# Patient Record
Sex: FEMALE | Race: WHITE | NOT HISPANIC OR LATINO | Employment: FULL TIME | URBAN - METROPOLITAN AREA
[De-identification: names, ages, dates, MRNs, and addresses within clinical notes are randomized per-mention and may not be internally consistent; named-entity substitution may affect disease eponyms.]

---

## 2017-01-19 ENCOUNTER — OFFICE VISIT (OUTPATIENT)
Dept: GASTROENTEROLOGY | Facility: CLINIC | Age: 42
End: 2017-01-19

## 2017-01-19 VITALS
WEIGHT: 192.2 LBS | SYSTOLIC BLOOD PRESSURE: 128 MMHG | BODY MASS INDEX: 29.13 KG/M2 | HEIGHT: 68 IN | DIASTOLIC BLOOD PRESSURE: 78 MMHG

## 2017-01-19 DIAGNOSIS — R10.13 DYSPEPSIA: Primary | ICD-10-CM

## 2017-01-19 PROCEDURE — 99204 OFFICE O/P NEW MOD 45 MIN: CPT | Performed by: INTERNAL MEDICINE

## 2017-01-19 RX ORDER — DEXTROAMPHETAMINE SACCHARATE, AMPHETAMINE ASPARTATE, DEXTROAMPHETAMINE SULFATE AND AMPHETAMINE SULFATE 3.75; 3.75; 3.75; 3.75 MG/1; MG/1; MG/1; MG/1
TABLET ORAL
Refills: 0 | COMMUNITY
Start: 2017-01-03 | End: 2021-06-16

## 2017-01-19 RX ORDER — VALSARTAN AND HYDROCHLOROTHIAZIDE 320; 25 MG/1; MG/1
TABLET, FILM COATED ORAL
Refills: 0 | COMMUNITY
Start: 2017-01-04 | End: 2019-05-21

## 2017-01-19 NOTE — MR AVS SNAPSHOT
Radha Machado   2017 8:45 AM   Office Visit    Dept Phone:  [phone]   Encounter #:  [id number]    Provider:  Kody Mendez MD   Department:  [facility]                Your Full Care Plan              Your Updated Medication List          This list is accurate as of: 17  9:56 AM.  Always use your most recent med list.                amphetamine-dextroamphetamine 15 MG tablet   Commonly known as:  ADDERALL       MULTIVITAMIN ADULT PO       valsartan-hydrochlorothiazide 320-25 MG per tablet   Commonly known as:  DIOVAN-HCT               You Were Diagnosed With        Codes Comments    Dyspepsia    -  Primary ICD-10-CM: R10.13  ICD-9-CM: 536.8       Instructions     None    Patient Instructions History      Upcoming Appointments     Visit Type Date Time Department    NEW PATIENT 2017  8:45 AM [facility]      VIVA Signup     [facility] VIVA allows you to send messages to your doctor, view your test results, renew your prescriptions, schedule appointments, and more. To sign up, go to [email or web address] and click on the Sign Up Now link in the New User? box. Enter your VIVA Activation Code exactly as it appears below along with the last four digits of your Social Security Number and your Date of Birth () to complete the sign-up process. If you do not sign up before the expiration date, you must request a new code.    VIVA Activation Code: [email or web address]  Expires: 2017  9:56 AM    If you have questions, you can email [email or web address] or call [phone] to talk to our VIVA staff. Remember, VIVA is NOT to be used for urgent needs. For medical emergencies, dial 911.               Other Info from Your Visit           Allergies     No Known Allergies      Reason for Visit     Heartburn     regurgitation           Vital Signs     Blood Pressure Height Weight Body Mass Index Smoking Status  "      128/78 68\" (172.7 cm) 192 lb 3.2 oz (87.2 kg) 29.22 kg/m2 Never Smoker       Problems and Diagnoses Noted     Dyspepsia    -  Primary        "

## 2017-01-19 NOTE — PROGRESS NOTES
Chief Complaint   Patient presents with   • Heartburn   • regurgitation       History of Present Illness: She has lots of heartburn and may vomit x couple of years, not constant. No abdominal or chest pain.  No dysphagia. No dairrhea or constipation.  NO rectal bleeding or melena. NO NSAIDs use. Weight has been stable. She takes Zantac and OTC ppi's which may help.  Occasional ETOH. She drinks 2 cups of coffee/day. LMP yesterday. .     Past Medical History   Diagnosis Date   • Hypertension        Past Surgical History   Procedure Laterality Date   • Breast augmentation     • [place] tooth extraction           Current Outpatient Prescriptions:   •  Multiple Vitamins-Minerals (MULTIVITAMIN ADULT PO), Take  by mouth., Disp: , Rfl:   •  amphetamine-dextroamphetamine (ADDERALL) 15 MG tablet, TK 1 T PO  BID, Disp: , Rfl: 0  •  valsartan-hydrochlorothiazide (DIOVAN-HCT) 320-25 MG per tablet, TK 1 T PO QD, Disp: , Rfl: 0    No Known Allergies    Family History   Problem Relation Age of Onset   • Adopted: Yes       Social History     Social History   • Marital status: Single     Spouse name: N/A   • Number of children: N/A   • Years of education: N/A     Occupational History   • Not on file.     Social History Main Topics   • Smoking status: Never Smoker   • Smokeless tobacco: Not on file   • Alcohol use Yes      Comment: social   • Drug use: No   • Sexual activity: Not on file     Other Topics Concern   • Not on file     Social History Narrative   • No narrative on file       Review of Systems   All other systems reviewed and are negative.      Vitals:    17 0900   BP: 128/78       Physical Exam   Constitutional: She is oriented to person, place, and time. She appears well-developed and well-nourished. No distress.   HENT:   Head: Normocephalic and atraumatic. Hair is normal.   Right Ear: Hearing, tympanic membrane, external ear and ear canal normal. No drainage. No decreased hearing is noted.   Left Ear: Hearing,  tympanic membrane, external ear and ear canal normal. No decreased hearing is noted.   Nose: No nasal deformity.   Mouth/Throat: Oropharynx is clear and moist.   Eyes: Conjunctivae, EOM and lids are normal. Pupils are equal, round, and reactive to light. Right eye exhibits no discharge. Left eye exhibits no discharge.   Neck: Normal range of motion. Neck supple. No JVD present. No tracheal deviation present. No thyromegaly present.   Cardiovascular: Normal rate, regular rhythm, normal heart sounds, intact distal pulses and normal pulses.  Exam reveals no gallop and no friction rub.    No murmur heard.  Pulmonary/Chest: Effort normal and breath sounds normal. No respiratory distress. She has no wheezes. She has no rales. She exhibits no tenderness.   Abdominal: Soft. Bowel sounds are normal. She exhibits no distension and no mass. There is no tenderness. There is no rebound and no guarding. No hernia.   Genitourinary:   Genitourinary Comments: Rectal refused   Musculoskeletal: Normal range of motion. She exhibits no edema, tenderness or deformity.   Lymphadenopathy:     She has no cervical adenopathy.   Neurological: She is alert and oriented to person, place, and time. She has normal reflexes. She displays normal reflexes. No cranial nerve deficit. She exhibits normal muscle tone. Coordination normal.   Skin: Skin is warm and dry. No rash noted. She is not diaphoretic. No erythema.   Psychiatric: She has a normal mood and affect. Her behavior is normal. Judgment and thought content normal.   Vitals reviewed.      Radha was seen today for heartburn and regurgitation.    Diagnoses and all orders for this visit:    Dyspepsia  -     US Gallbladder; Future       Assessment:  1) Dyspepsia    Recommendations:  1) Protonix 40 mg/day  2) US of the gallbladder  3) f/u 4-6 weeks.      Return in about 4 weeks (around 2/16/2017).

## 2017-08-14 ENCOUNTER — PROCEDURE VISIT (OUTPATIENT)
Dept: OBSTETRICS AND GYNECOLOGY | Age: 42
End: 2017-08-14

## 2017-08-14 ENCOUNTER — OFFICE VISIT (OUTPATIENT)
Dept: OBSTETRICS AND GYNECOLOGY | Age: 42
End: 2017-08-14

## 2017-08-14 VITALS
DIASTOLIC BLOOD PRESSURE: 72 MMHG | BODY MASS INDEX: 28.64 KG/M2 | WEIGHT: 189 LBS | SYSTOLIC BLOOD PRESSURE: 110 MMHG | HEIGHT: 68 IN

## 2017-08-14 DIAGNOSIS — F32.81 PREMENSTRUAL DYSPHORIC DISORDER: ICD-10-CM

## 2017-08-14 DIAGNOSIS — Z01.419 ENCOUNTER FOR GYNECOLOGICAL EXAMINATION: Primary | ICD-10-CM

## 2017-08-14 DIAGNOSIS — R11.0 NAUSEA IN ADULT: ICD-10-CM

## 2017-08-14 DIAGNOSIS — R10.2 PELVIC PAIN: Primary | ICD-10-CM

## 2017-08-14 DIAGNOSIS — L65.9 HAIR LOSS: ICD-10-CM

## 2017-08-14 DIAGNOSIS — R10.2 PELVIC PAIN IN FEMALE: ICD-10-CM

## 2017-08-14 PROCEDURE — 99386 PREV VISIT NEW AGE 40-64: CPT | Performed by: OBSTETRICS & GYNECOLOGY

## 2017-08-14 PROCEDURE — 76830 TRANSVAGINAL US NON-OB: CPT | Performed by: OBSTETRICS & GYNECOLOGY

## 2017-08-14 RX ORDER — NORETHINDRONE ACETATE AND ETHINYL ESTRADIOL 1; 5 MG/1; UG/1
1 TABLET ORAL DAILY
Qty: 28 TABLET | Refills: 11 | Status: SHIPPED | OUTPATIENT
Start: 2017-08-14 | End: 2018-07-19 | Stop reason: SDUPTHER

## 2017-08-14 NOTE — PROGRESS NOTES
"Subjective     Chief Complaint   Patient presents with   • Gynecologic Exam     New pt:annual,discuss painful ovulation and periods         History of Present Illness      Radha Machado is a very pleasant  42 y.o. female who presents for annual exam.  Mammo [id number] , Discussing importance of scheduling again this year., Contraception vas, Exercise none.    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 Symptoms,    pelvic pain in the second half of her menstrual cycle    As well as some nausea and vomiting intermittently    She has no galactorrhea no hirsutism, she is adopted family history is lacking. She states she is not under any increased stress. She does work third shift sleep cycles not always normal      Obstetric History:  OB History     No data available         Menstrual History:     Patient's last menstrual period was 07/20/2017.       Sexual History:       Past Medical History:   Diagnosis Date   • High blood pressure    • Hypertension    • Menses painful      Past Surgical History:   Procedure Laterality Date   • [facility]      [facility]    • [facility]TRA[facility]         SOCIAL Hx:      The following portions of the patient's history were reviewed and updated as appropriate: allergies, current medications, past family history, past medical history, past social history, past surgical history and problem list.    Review of Systems        Except as outlined in history of physical illness, patient denies any changes in her GYN, , GI systems.  All other systems reviewed were negative.No galactorrhea no hirsutism, no change in diameter of TMs, no voiding pattern changes, no unusual levels of fatigue.         Objective   Physical Exam    /72  Ht 68\" (172.7 cm)  Wt 189 lb (85.7 kg)  LMP 07/20/2017  BMI 28.74 kg/m2    General: Patient is alert and oriented and appears overall " healthy  Neck: Is supple without thyromegaly, no carotid bruits and no lymphadenopathy  Lungs: Clear bilaterally, no wheezing, rhonchi, or rales.  Respiratory rate is normal  Breast: Even, symmetrical, no lymphadenopathy, no retraction, no masses or cysts, bilateral augmentation noted  Heart: Regular rate and rhythm are appreciated, no murmurs or rubs are heard  Abdomen: Is soft, without organomegaly, bowel sounds are positive, there is no                                rebound or guarding and palpation does not produce any discomfort  Back: Nontender without CVA tenderness  Pelvic: External genitalia appear normal and consistent with mature female.  BUS normal                            Vagina is clean dry without discharge and appears adequately estrogenized, no               lesions or masses are present                         Cervix is noninflamed without discharge or lesions.  There is no cervical motion             tenderness.                Uterus is nonenlarged, without tenderness, and no masses or abnormalities are  present               Adnexa are non-enlarged, non tender               Rectal exam reveals adequate sphincter tone and no masses or lesions are                     appreciated on digital rectal examination.    Patient Active Problem List   Diagnosis   • Hair loss                 Assessment/Plan   Radha was seen today for gynecologic exam.    Diagnoses and all orders for this visit:    Encounter for gynecological examination  -     IGP, Apt HPV,rfx 16 / 18,45    Premenstrual dysphoric disorder  -     DHEA-Sulfate  -     Estradiol  -     Testosterone (Free & Total), LC / MS  -     Thyroid Panel With TSH    Pelvic pain in female  This has been going for 4 years but seems to be worse the last 4 months more pronounced in the second half of the menstrual cycle also increased about the same time hair loss increased. We will check a pelvic ultrasound and lab work as outlined above. Consideration as  above to cycle control.  Hair loss  -     DHEA-Sulfate  -     Estradiol  -     Testosterone (Free & Total), LC / MS  -     Thyroid Panel With TSH    Nausea in adult  If GYN workup negative then would recommend following up with PCP her gastroenterologist. Possibility of obtaining a colonoscopy was discussed. Patient will decide      Annual Well Woman Exam    Discussed today's findings and concerns with patient in detail.  Continue to recommend regular exercise to include cardiovascular and resistance training and breast self-exam. Wellness lab, mammography, and pap smear, in accordance with age guidelines.  Nutritional and caloric recommendations discussed.    All of the patient's questions were addressed and answered, I have encouraged her to call for today's test results if she has not received them within 10 days.  Patient is advised to call with any change in her condition or with any other questions, otherwise return in 12 months for annual examination.

## 2017-08-16 LAB
CYTOLOGIST CVX/VAG CYTO: NORMAL
CYTOLOGY CVX/VAG DOC THIN PREP: NORMAL
DX ICD CODE: NORMAL
HIV 1 & 2 AB SER-IMP: NORMAL
HPV I/H RISK 4 DNA CVX QL PROBE+SIG AMP: NEGATIVE
OTHER STN SPEC: NORMAL
PATH REPORT.FINAL DX SPEC: NORMAL
STAT OF ADQ CVX/VAG CYTO-IMP: NORMAL

## 2017-08-17 LAB
DHEA-S SERPL-MCNC: 88.8 UG/DL (ref 57.3–279.2)
ESTRADIOL SERPL-MCNC: 51.7 PG/ML
FT4I SERPL CALC-MCNC: 2.5 (ref 1.2–4.9)
T3RU NFR SERPL: 26 % (ref 24–39)
T4 SERPL-MCNC: 9.5 UG/DL (ref 4.5–12)
TESTOST FREE SERPL-MCNC: 0.7 PG/ML (ref 0–4.2)
TESTOST SERPL-MCNC: 9.3 NG/DL
TSH SERPL DL<=0.005 MIU/L-ACNC: 0.69 UIU/ML (ref 0.45–4.5)

## 2017-08-21 ENCOUNTER — TELEPHONE (OUTPATIENT)
Dept: OBSTETRICS AND GYNECOLOGY | Age: 42
End: 2017-08-21

## 2017-08-21 NOTE — TELEPHONE ENCOUNTER
----- Message from Alin Swain MD sent at 8/21/2017  7:44 AM EDT -----  Please notify patient that her labs were normal please keep follow-up appointment as scheduled and call with any change

## 2017-10-03 ENCOUNTER — HOSPITAL ENCOUNTER (EMERGENCY)
Facility: HOSPITAL | Age: 42
Discharge: HOME | End: 2017-10-03
Attending: FAMILY MEDICINE | Admitting: FAMILY MEDICINE

## 2017-10-03 VITALS
BODY MASS INDEX: 28.04 KG/M2 | TEMPERATURE: 97.7 F | DIASTOLIC BLOOD PRESSURE: 91 MMHG | HEART RATE: 75 BPM | SYSTOLIC BLOOD PRESSURE: 123 MMHG | WEIGHT: 185 LBS | HEIGHT: 68 IN | OXYGEN SATURATION: 96 % | RESPIRATION RATE: 16 BRPM

## 2017-10-03 DIAGNOSIS — S39.012A STRAIN OF LUMBAR REGION, INITIAL ENCOUNTER: Primary | ICD-10-CM

## 2017-10-03 PROCEDURE — 99283 EMERGENCY DEPT VISIT LOW MDM: CPT

## 2017-10-03 RX ORDER — HYDROCODONE BITARTRATE AND ACETAMINOPHEN 7.5; 325 MG/1; MG/1
1 TABLET ORAL EVERY 6 HOURS PRN
Qty: 15 TABLET | Refills: 0 | Status: SHIPPED | OUTPATIENT
Start: 2017-10-03 | End: 2018-09-28

## 2017-10-03 RX ORDER — BACLOFEN 10 MG/1
10 TABLET ORAL 3 TIMES DAILY PRN
Qty: 20 TABLET | Refills: 0 | Status: SHIPPED | OUTPATIENT
Start: 2017-10-03 | End: 2018-09-28

## 2017-10-03 RX ORDER — IBUPROFEN 800 MG/1
800 TABLET ORAL EVERY 8 HOURS PRN
Qty: 30 TABLET | Refills: 0 | Status: SHIPPED | OUTPATIENT
Start: 2017-10-03 | End: 2019-05-21

## 2017-10-03 RX ORDER — ONDANSETRON 4 MG/1
4 TABLET, FILM COATED ORAL EVERY 8 HOURS PRN
Qty: 15 TABLET | Refills: 0 | Status: SHIPPED | OUTPATIENT
Start: 2017-10-03 | End: 2018-07-27

## 2017-10-03 NOTE — ED PROVIDER NOTES
History    The patient presents to the ED complaining of atraumatic, positional centralized lower back pain which began approximately two days ago. She denies radiation of pain down her BLE. 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 She also denies any focal weakness, incontinence of bowel or bladder or saddle anesthesia since onset and she has no other complaints at this time.     On physical exam, non-focal neuro exam, moves easily     Plan: Discharge the patient home.     I supervised care provided by the midlevel provider.  We have discussed this patient's history, physical exam, and treatment plan. I have reviewed the note and personally saw and examined the patient and agree with the plan of care.    Documentation assistance provided by karishma Langford for .  Information recorded by the scribe was done at my direction and has been verified and validated by me.       Max Langford  10/03/17 1023       Raudel Wyman MD  10/03/17 [id number]
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
## 2018-07-20 RX ORDER — NORETHINDRONE ACETATE AND ETHINYL ESTRADIOL 1; 5 MG/1; UG/1
1 TABLET ORAL DAILY
Qty: 28 TABLET | Refills: 11 | Status: SHIPPED | OUTPATIENT
Start: 2018-07-20 | End: 2019-12-13

## 2018-07-27 ENCOUNTER — OFFICE VISIT (OUTPATIENT)
Dept: GASTROENTEROLOGY | Facility: CLINIC | Age: 43
End: 2018-07-27

## 2018-07-27 VITALS
WEIGHT: 189.2 LBS | TEMPERATURE: 98 F | SYSTOLIC BLOOD PRESSURE: 128 MMHG | BODY MASS INDEX: 28.67 KG/M2 | HEIGHT: 68 IN | DIASTOLIC BLOOD PRESSURE: 70 MMHG

## 2018-07-27 DIAGNOSIS — K21.9 GASTROESOPHAGEAL REFLUX DISEASE, ESOPHAGITIS PRESENCE NOT SPECIFIED: Primary | ICD-10-CM

## 2018-07-27 DIAGNOSIS — R11.0 NAUSEA: ICD-10-CM

## 2018-07-27 DIAGNOSIS — R10.13 DYSPEPSIA: ICD-10-CM

## 2018-07-27 DIAGNOSIS — Z12.11 ENCOUNTER FOR SCREENING FOR MALIGNANT NEOPLASM OF COLON: ICD-10-CM

## 2018-07-27 PROCEDURE — 99214 OFFICE O/P EST MOD 30 MIN: CPT | Performed by: NURSE PRACTITIONER

## 2018-07-27 RX ORDER — PANTOPRAZOLE SODIUM 40 MG/1
40 TABLET, DELAYED RELEASE ORAL DAILY
Qty: 90 TABLET | Refills: 1 | Status: SHIPPED | OUTPATIENT
Start: 2018-07-27 | End: 2019-08-23 | Stop reason: SDUPTHER

## 2018-07-27 RX ORDER — PANTOPRAZOLE SODIUM 40 MG/1
40 TABLET, DELAYED RELEASE ORAL DAILY
Qty: 90 TABLET | Refills: 3 | Status: SHIPPED | OUTPATIENT
Start: 2018-07-27 | End: 2018-07-27 | Stop reason: SDUPTHER

## 2018-07-27 RX ORDER — ONDANSETRON 4 MG/1
4 TABLET, ORALLY DISINTEGRATING ORAL EVERY 8 HOURS PRN
Qty: 20 TABLET | Refills: 2 | Status: SHIPPED | OUTPATIENT
Start: 2018-07-27 | End: 2019-08-06 | Stop reason: SDUPTHER

## 2018-07-27 RX ORDER — ONDANSETRON 4 MG/1
4 TABLET, ORALLY DISINTEGRATING ORAL EVERY 8 HOURS PRN
Qty: 20 TABLET | Refills: 2 | Status: SHIPPED | OUTPATIENT
Start: 2018-07-27 | End: 2018-07-27 | Stop reason: SDUPTHER

## 2018-07-27 NOTE — PROGRESS NOTES
Chief Complaint   Patient presents with   • Dyspepsia       Radha Robles is a  43 y.o. female here for a follow up visit for GERD and dyspepsia.   HPI  42 yo f presents today for follow up visit for GERD and dyspepsia. She is a patient of Dr. Mendez. She was last seen in the office on 1/2017. She has hx GERD and dyspepsia. She has been doing well on protonix 40 mg daily. She admits she ran out of it several weeks ago and has been miserable since. She admits with it she denies any dysphagia, reflux, abd pain, N&V, diarrhea, constipation, rectal bleeding or melena. She admits her appetite is good and her weight is stable. She is adopted and has no family hx. She is due for screening colonoscopy.     Past Medical History:   Diagnosis Date   • High blood pressure    • Hypertension    • Menses painful        Past Surgical History:   Procedure Laterality Date   • BREAST AUGMENTATION      2005,  ,[facility]    • WISDOM TOOTH EXTRACTION         Scheduled Meds:    Continuous Infusions:  No current facility-administered medications for this visit.     PRN Meds:.    No Known Allergies    Social History     Social History   • Marital status: Single     Spouse name: N/A   • Number of children: N/A   • Years of education: N/A     Occupational History   • Not on file.     Social History Main Topics   • Smoking status: Never Smoker   • Smokeless tobacco: Never Used   • Alcohol use Yes      Comment: social   • Drug use: No   • Sexual activity: Not on file     Other Topics Concern   • Not on file     Social History Narrative   • No narrative on file       Family History   Problem Relation Age of Onset   • Adopted: Yes       Review of Systems   Constitutional: Negative for appetite change, chills, diaphoresis, fatigue, fever and unexpected weight change.   HENT: Negative for nosebleeds, postnasal drip, sore throat, trouble swallowing and voice change.    Respiratory: Negative for cough, choking, chest tightness, shortness of  breath and wheezing.    Cardiovascular: Negative for chest pain.   Gastrointestinal: Negative for abdominal distention, abdominal pain, anal bleeding, blood in stool, constipation, diarrhea, nausea, rectal pain and vomiting.   Endocrine: Negative for polydipsia, polyphagia and polyuria.   Musculoskeletal: Negative for gait problem.   Skin: Negative for rash and wound.   Allergic/Immunologic: Negative for food allergies.   Neurological: Negative for dizziness, speech difficulty and light-headedness.   Psychiatric/Behavioral: Negative for confusion, self-injury, sleep disturbance and suicidal ideas.       Vitals:    07/27/18 1503   BP: 128/70   Temp: 98 °F (36.7 °C)       Physical Exam   Constitutional: She is oriented to person, place, and time. She appears well-developed and well-nourished. She does not appear ill. No distress.   HENT:   Head: Normocephalic.   Eyes: Pupils are equal, round, and reactive to light.   Cardiovascular: Normal rate, regular rhythm and normal heart sounds.    Pulmonary/Chest: Effort normal and breath sounds normal.   Abdominal: Soft. Bowel sounds are normal. She exhibits no distension and no mass. There is no hepatosplenomegaly. There is no tenderness. There is no rebound and no guarding. No hernia.   Musculoskeletal: Normal range of motion.   Neurological: She is alert and oriented to person, place, and time.   Skin: Skin is warm and dry.   Psychiatric: She has a normal mood and affect. Her speech is normal and behavior is normal. Judgment normal.       No images are attached to the encounter.    Assessment & Plan     1. Gastroesophageal reflux disease, esophagitis presence not specified  - pantoprazole (PROTONIX) 40 MG EC tablet; Take 1 tablet by mouth Daily.  Dispense: 90 tablet; Refill: 3    2. Dyspepsia    3. Encounter for screening for malignant neoplasm of colon  - Case Request; Standing  - Case Request    4. Nausea  - ondansetron ODT (ZOFRAN ODT) 4 MG disintegrating tablet; Take 1  tablet by mouth Every 8 (Eight) Hours As Needed for Nausea or Vomiting.  Dispense: 20 tablet; Refill: 2    GERD and dyspepsia is well controlled on the protonix 40 mg daily. Will refill x 1 year. She is due for screening colonoscopy. I discussed the potential risks of the procedure with the patient and she is agreeable to proceed. Follow up with Dr. Mendez in 1 year. Call the office with any issues.

## 2018-09-28 ENCOUNTER — OFFICE VISIT (OUTPATIENT)
Dept: OBSTETRICS AND GYNECOLOGY | Age: 43
End: 2018-09-28

## 2018-09-28 VITALS
BODY MASS INDEX: 29.55 KG/M2 | DIASTOLIC BLOOD PRESSURE: 84 MMHG | WEIGHT: 195 LBS | SYSTOLIC BLOOD PRESSURE: 124 MMHG | HEIGHT: 68 IN

## 2018-09-28 DIAGNOSIS — Z01.419 WELL WOMAN EXAM WITH ROUTINE GYNECOLOGICAL EXAM: Primary | ICD-10-CM

## 2018-09-28 PROCEDURE — 99396 PREV VISIT EST AGE 40-64: CPT | Performed by: PHYSICIAN ASSISTANT

## 2018-09-28 NOTE — PROGRESS NOTES
"Subjective     Chief Complaint   Patient presents with   • Gynecologic Exam     annual exam. last pap 08/14/2017 neg/neg  m/g 2017       History of Present Illness    Radha Robles is a 43 y.o. No obstetric history on file. who presents for annual exam.    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         Current contraception: OCP (estrogen/progesterone)  History of abnormal Pap smear: no  Received Gardasil immunization: no  Perform regular self breast exam: yes - mthly  Family history of uterine or ovarian cancer: unsure d/t adopted  Family History of colon cancer: adopted  Family history of breast cancer: adopted    Mammogram: ordered.  Colonoscopy: recommended. Has it schedule in October  DEXA: not indicated.    Exercise: moderately active  Calcium/Vitamin D: adequate intake    The following portions of the patient's history were reviewed and updated as appropriate: allergies, current medications, past family history, past medical history, past social history, past surgical history and problem list.    Review of Systems   All other systems reviewed and are negative.      Review of Systems   Constitutional: Negative for fatigue.   Respiratory: Negative for shortness of breath.    Gastrointestinal: Negative for abdominal pain.   Genitourinary: Negative for dysuria.   Neurological: Negative for headaches.   Psychiatric/Behavioral: Negative for dysphoric mood.         Objective   Physical Exam    /84   Ht 172.7 cm (68\")   Wt 88.5 kg (195 lb)   Breastfeeding? No   BMI 29.65 kg/m²     General:   alert, appears stated age and " cooperative   Neck: no adenopathy and thyroid normal to palpation   Heart: regular rate and rhythm   Lungs: clear to auscultation bilaterally   Abdomen: soft and nontender   Breast: inspection negative, no nipple discharge or bleeding, no masses or nodularity palpable and B implants intact   Vulva: normal   Vagina: normal mucosa, normal discharge   Cervix: no lesions   Uterus: normal size, non-tender   Adnexa: normal adnexa and no mass, fullness, tenderness   Rectal: not indicated     Assessment/Plan   Radha was seen today for gynecologic exam.    Diagnoses and all orders for this visit:    Well woman exam with routine gynecological exam        All questions answered.  Breast self exam technique reviewed and patient encouraged to perform self-exam monthly.  Discussed healthy lifestyle modifications.  Recommended 30 minutes of aerobic exercise five times per week.  Discussed calcium needs to prevent osteoporosis.    Disc pap guidelines, will do pap in 2020  Call for any problems

## 2018-10-30 ENCOUNTER — ANESTHESIA EVENT (OUTPATIENT)
Dept: GASTROENTEROLOGY | Facility: HOSPITAL | Age: 43
End: 2018-10-30

## 2018-10-30 ENCOUNTER — HOSPITAL ENCOUNTER (OUTPATIENT)
Facility: HOSPITAL | Age: 43
Setting detail: HOSPITAL OUTPATIENT SURGERY
Discharge: HOME | End: 2018-10-30
Attending: INTERNAL MEDICINE | Admitting: INTERNAL MEDICINE

## 2018-10-30 ENCOUNTER — ANESTHESIA (OUTPATIENT)
Dept: GASTROENTEROLOGY | Facility: HOSPITAL | Age: 43
End: 2018-10-30

## 2018-10-30 VITALS
HEIGHT: 68 IN | OXYGEN SATURATION: 100 % | RESPIRATION RATE: 18 BRPM | BODY MASS INDEX: 29.55 KG/M2 | TEMPERATURE: 97.7 F | DIASTOLIC BLOOD PRESSURE: 82 MMHG | HEART RATE: 67 BPM | SYSTOLIC BLOOD PRESSURE: 115 MMHG | WEIGHT: 195 LBS

## 2018-10-30 DIAGNOSIS — Z12.11 ENCOUNTER FOR SCREENING FOR MALIGNANT NEOPLASM OF COLON: ICD-10-CM

## 2018-10-30 PROCEDURE — 88305 TISSUE EXAM BY PATHOLOGIST: CPT | Performed by: INTERNAL MEDICINE

## 2018-10-30 PROCEDURE — 45380 COLONOSCOPY AND BIOPSY: CPT | Performed by: INTERNAL MEDICINE

## 2018-10-30 PROCEDURE — 25010000002 PROPOFOL 10 MG/ML EMULSION: Performed by: ANESTHESIOLOGY

## 2018-10-30 RX ORDER — LIDOCAINE HYDROCHLORIDE 20 MG/ML
INJECTION, SOLUTION INFILTRATION; PERINEURAL AS NEEDED
Status: DISCONTINUED | OUTPATIENT
Start: 2018-10-30 | End: 2018-10-30 | Stop reason: SURG

## 2018-10-30 RX ORDER — SODIUM CHLORIDE, SODIUM LACTATE, POTASSIUM CHLORIDE, CALCIUM CHLORIDE 600; 310; 30; 20 MG/100ML; MG/100ML; MG/100ML; MG/100ML
30 INJECTION, SOLUTION INTRAVENOUS CONTINUOUS PRN
Status: DISCONTINUED | OUTPATIENT
Start: 2018-10-30 | End: 2018-10-30 | Stop reason: HOSPADM

## 2018-10-30 RX ORDER — SODIUM CHLORIDE 0.9 % (FLUSH) 0.9 %
3-10 SYRINGE (ML) INJECTION AS NEEDED
Status: DISCONTINUED | OUTPATIENT
Start: 2018-10-30 | End: 2018-10-30 | Stop reason: HOSPADM

## 2018-10-30 RX ORDER — PROPOFOL 10 MG/ML
VIAL (ML) INTRAVENOUS AS NEEDED
Status: DISCONTINUED | OUTPATIENT
Start: 2018-10-30 | End: 2018-10-30 | Stop reason: SURG

## 2018-10-30 RX ORDER — SODIUM CHLORIDE 0.9 % (FLUSH) 0.9 %
3 SYRINGE (ML) INJECTION EVERY 12 HOURS SCHEDULED
Status: DISCONTINUED | OUTPATIENT
Start: 2018-10-30 | End: 2018-10-30 | Stop reason: HOSPADM

## 2018-10-30 RX ADMIN — PROPOFOL 350 MG: 10 INJECTION, EMULSION INTRAVENOUS at 08:10

## 2018-10-30 RX ADMIN — LIDOCAINE HYDROCHLORIDE 80 MG: 20 INJECTION, SOLUTION INFILTRATION; PERINEURAL at 08:10

## 2018-10-30 RX ADMIN — SODIUM CHLORIDE, POTASSIUM CHLORIDE, SODIUM LACTATE AND CALCIUM CHLORIDE 30 ML/HR: 600; 310; 30; 20 INJECTION, SOLUTION INTRAVENOUS at 07:56

## 2018-10-30 RX ADMIN — SODIUM CHLORIDE, POTASSIUM CHLORIDE, SODIUM LACTATE AND CALCIUM CHLORIDE: 600; 310; 30; 20 INJECTION, SOLUTION INTRAVENOUS at 08:10

## 2018-10-30 NOTE — ANESTHESIA POSTPROCEDURE EVALUATION
"Patient: Radha Robles    Procedure Summary     Date:  10/30/18 Room / Location:   [facility] /  [facility]    Anesthesia Start:  0811 Anesthesia Stop:  0834    Procedure:  COLONOSCOPY TO THE CECUM AND TI WITH COLD BX POLYPECTOMIES (N/A ) Diagnosis:       Encounter for screening for malignant neoplasm of colon      (Encounter for screening for malignant neoplasm of colon [Z12.11])    Surgeon:  Kody Mendez MD Provider:  Rasta Moraes MD    Anesthesia Type:  MAC ASA Status:  2          Anesthesia Type: MAC  Last vitals  BP   126/86 (10/30/18 0728)   Temp   36.5 °C (97.7 °F) (10/30/18 0728)   Pulse   75 (10/30/18 0728)   Resp   12 (10/30/18 0728)     SpO2   100 % (10/30/18 0728)     Post Anesthesia Care and Evaluation    Patient location during evaluation: bedside  Patient participation: complete - patient participated  Level of consciousness: awake and alert  Pain management: adequate  Airway patency: patent  Anesthetic complications: No anesthetic complications    Cardiovascular status: acceptable  Respiratory status: acceptable  Hydration status: acceptable    Comments: /86 (BP Location: Left arm, Patient Position: Lying)   Pulse 75   Temp 36.5 °C (97.7 °F) (Oral)   Resp 12   Ht 172.7 cm (68\")   Wt 88.5 kg (195 lb)   SpO2 100%   BMI 29.65 kg/m²       "

## 2018-10-30 NOTE — ANESTHESIA PREPROCEDURE EVALUATION
Anesthesia Evaluation     Patient summary reviewed and Nursing notes reviewed   NPO Solid Status: > 8 hours  NPO Liquid Status: > 4 hours           Airway   Mallampati: II  TM distance: >3 FB  Neck ROM: full  no difficulty expected  Dental - normal exam     Pulmonary - normal exam   Cardiovascular - normal exam    (+) hypertension,       Neuro/Psych  GI/Hepatic/Renal/Endo      Musculoskeletal     Abdominal  - normal exam   Substance History      OB/GYN          Other                        Anesthesia Plan    ASA 2     MAC     Anesthetic plan, all risks, benefits, and alternatives have been provided, discussed and informed consent has been obtained with: patient.

## 2018-10-31 LAB
CYTO UR: NORMAL
LAB AP CASE REPORT: NORMAL
PATH REPORT.FINAL DX SPEC: NORMAL
PATH REPORT.GROSS SPEC: NORMAL

## 2018-11-06 ENCOUNTER — APPOINTMENT (OUTPATIENT)
Dept: WOMENS IMAGING | Facility: HOSPITAL | Age: 43
End: 2018-11-06

## 2018-11-06 ENCOUNTER — PROCEDURE VISIT (OUTPATIENT)
Dept: OBSTETRICS AND GYNECOLOGY | Age: 43
End: 2018-11-06

## 2018-11-06 DIAGNOSIS — Z12.31 VISIT FOR SCREENING MAMMOGRAM: Primary | ICD-10-CM

## 2018-11-06 PROCEDURE — 77067 SCR MAMMO BI INCL CAD: CPT | Performed by: RADIOLOGY

## 2018-11-06 PROCEDURE — 77067 SCR MAMMO BI INCL CAD: CPT | Performed by: OBSTETRICS & GYNECOLOGY

## 2018-11-14 ENCOUNTER — TELEPHONE (OUTPATIENT)
Dept: GASTROENTEROLOGY | Facility: CLINIC | Age: 43
End: 2018-11-14

## 2018-11-14 NOTE — TELEPHONE ENCOUNTER
----- Message from Kody Mendez MD sent at 11/14/2018  8:42 AM EST -----  Tell her that the colon polyps that were removed were not cancerous and not precancerous. I recommend a repeat screening colonoscopy in ten years. Thx. kjh

## 2018-11-14 NOTE — TELEPHONE ENCOUNTER
Call from pt.  Advise per Dr Mendez that colon polyps that were removed were not cancerous and not precancerous.  Recommend repeat screening c/s in 10 yrs.  Pt verb understanding.

## 2019-01-07 ENCOUNTER — HOSPITAL ENCOUNTER (EMERGENCY)
Facility: HOSPITAL | Age: 44
Discharge: HOME | End: 2019-01-07
Attending: EMERGENCY MEDICINE | Admitting: EMERGENCY MEDICINE

## 2019-01-07 VITALS
WEIGHT: 193 LBS | RESPIRATION RATE: 18 BRPM | TEMPERATURE: 99.1 F | HEART RATE: 106 BPM | OXYGEN SATURATION: 98 % | BODY MASS INDEX: 29.25 KG/M2 | HEIGHT: 68 IN | DIASTOLIC BLOOD PRESSURE: 86 MMHG | SYSTOLIC BLOOD PRESSURE: 122 MMHG

## 2019-01-07 DIAGNOSIS — J10.1 INFLUENZA A: Primary | ICD-10-CM

## 2019-01-07 LAB
FLUAV AG NPH QL: POSITIVE
FLUBV AG NPH QL IA: NEGATIVE

## 2019-01-07 PROCEDURE — 99283 EMERGENCY DEPT VISIT LOW MDM: CPT

## 2019-01-07 PROCEDURE — 87804 INFLUENZA ASSAY W/OPTIC: CPT | Performed by: EMERGENCY MEDICINE

## 2019-01-07 RX ADMIN — HYDROCODONE POLISTIREX AND CHLORPHENIRAMINE POLISTIREX 5 ML: 10; 8 SUSPENSION, EXTENDED RELEASE ORAL at 05:15

## 2019-01-07 NOTE — ED PROVIDER NOTES
EMERGENCY DEPARTMENT ENCOUNTER    CHIEF COMPLAINT  Chief Complaint: fever  History given by: patient  History limited by: nothing  Room Number: 15/15  PMD: Jaylan Gleason MD      HPI:  Pt is a 43 y.o. female who presents complaining of fever, a dry cough and generalized myalgias for the last 2-3 days. Pt also complains of diarrhea, post-tussive emesis and decreased appetite but denies nausea. 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 Pt states that she has taken Nyquil and Dayquil with mild transient relief    Duration:  2-3 days  Onset: gradual  Timing: constant  Location: generalized  Radiation: N/A  Quality: fever  Intensity/Severity: moderate  Progression: unchanged  Associated Symptoms: cough, generalized myalgias, diarrhea, post-tussive emesis, decreased appetite  Aggravating Factors: none  Alleviating Factors: none  Previous Episodes: none  Treatment before arrival: Pt states that she has taken Nyquil and Dayquil with mild transient relief    PAST MEDICAL HISTORY  Active Ambulatory Problems     Diagnosis Date Noted   • Hair loss 08/14/2017   • Encounter for screening for malignant neoplasm of colon 07/27/2018     Resolved Ambulatory Problems     Diagnosis Date Noted   • No Resolved Ambulatory Problems     Past Medical History:   Diagnosis Date   • High blood pressure    • Hypertension    • Menses painful        PAST SURGICAL HISTORY  Past Surgical History:   Procedure Laterality Date   • [facility]    • COLONOSCOPY N/A 10/30/2018    Procedure: COLONOSCOPY TO THE CECUM AND TI WITH COLD BX POLYPECTOMIES;  Surgeon: Kody Mendez MD;  Location: [facility] ENDOS[facility];  Service: Gastroenterology   • WISDOM TOOTH EXTRACTION         FAMILY HISTORY  Family History   Adopted: Yes       SOCIAL HISTORY  Social History     Socioeconomic History   • Marital status: Single     Spouse name: Not on file   • Number of children: Not on  file   • Years of education: Not on file   • Highest education level: Not on file   Social Needs   • Financial resource strain: Not on file   • Food insecurity - worry: Not on file   • Food insecurity - inability: Not on file   • Transportation needs - medical: Not on file   • Transportation needs - non-medical: Not on file   Occupational History   • Not on file   Tobacco Use   • Smoking status: Never Smoker   • Smokeless tobacco: Never Used   Substance and Sexual Activity   • Alcohol use: Yes     Comment: social   • Drug use: No   • Sexual activity: Yes     Partners: Male     Birth control/protection: None   Other Topics Concern   • Not on file   Social History Narrative   • Not on file       ALLERGIES  Patient has no known allergies.    REVIEW OF SYSTEMS  Review of Systems   Constitutional: Positive for appetite change (decreased) and fever.   HENT: Negative for sore throat.    Eyes: Negative.    Respiratory: Positive for cough. Negative for shortness of breath.    Cardiovascular: Negative for chest pain.   Gastrointestinal: Positive for diarrhea and vomiting (post-tussive). Negative for abdominal pain and nausea.   Genitourinary: Negative for dysuria.   Musculoskeletal: Positive for myalgias (generalized). Negative for neck pain.   Skin: Negative for rash.   Allergic/Immunologic: Negative.    Neurological: Negative for weakness, numbness and headaches.   Hematological: Negative.    Psychiatric/Behavioral: Negative.    All other systems reviewed and are negative.      PHYSICAL EXAM  ED Triage Vitals   Temp Heart Rate Resp BP SpO2   01/07/19 0400 01/07/19 0413 01/07/19 0400 01/07/19 0418 01/07/19 0413   100.2 °F (37.9 °C) 98 16 136/94 98 %      Temp src Heart Rate Source Patient Position BP Location FiO2 (%)   01/07/19 0400 01/07/19 0413 01/07/19 0418 01/07/19 0418 --   Tympanic Monitor Sitting Right arm        Physical Exam   Constitutional: She is oriented to person, place, and time. No distress.   HENT:   Head:  Normocephalic and atraumatic.   Mouth/Throat: Oropharynx is clear and moist.   Eyes: EOM are normal. Pupils are equal, round, and reactive to light.   Neck: Normal range of motion. Neck supple.   Cardiovascular: Normal rate, regular rhythm and normal heart sounds.   Pulmonary/Chest: Effort normal and breath sounds normal. No respiratory distress.   Pt is actively coughing.   Abdominal: Soft. There is no tenderness. There is no rebound and no guarding.   Musculoskeletal: Normal range of motion. She exhibits no edema.   Neurological: She is alert and oriented to person, place, and time. She has normal sensation and normal strength.   Skin: Skin is warm and dry. No rash noted.   Psychiatric: Mood and affect normal.   Nursing note and vitals reviewed.      LAB RESULTS  Lab Results (last 24 hours)     Procedure Component Value Units Date/Time    Influenza Antigen, Rapid - Swab, Nasopharynx [[id number]]  (Abnormal) Collected:  01/07/19 0430    Specimen:  Swab from Nasopharynx Updated:  01/07/19 0453     Influenza A Ag, EIA Positive     Influenza B Ag, EIA Negative          I ordered the above labs and reviewed the results        PROCEDURES  Procedures      PROGRESS AND CONSULTS     0436- Notified pt and family that the pt is not a Tamiflu candidate because of how long her symptoms have been occurring. Discussed the plan to order an influenza swab prior to likely discharging the pt home with prescriptions for a stronger cough medication. Pt understands and agrees with the plan, all questions answered.    0447- Ordered tussionex for cough.    0511- Rechecked pt. Pt is resting comfortably. Notified pt that she is positive for Influenza A. Discussed the plan to discharge the pt home with a prescription for Hycodan. I instructed the pt to drink plenty of fluids . Pt understands and agrees with the plan, all questions answered.      MEDICAL DECISION MAKING  Results were reviewed/discussed with the patient and they were also made  aware of online access. Pt also made aware that some labs, such as cultures, will not be resulted during ER visit and follow up with PMD is necessary.     MDM  Number of Diagnoses or Management Options  Influenza A:      Amount and/or Complexity of Data Reviewed  Clinical lab tests: ordered and reviewed (Pt is positive for influenza A)    Patient Progress  Patient progress: stable         DIAGNOSIS  Final diagnoses:   Influenza A       DISPOSITION  DISCHARGE    Patient discharged in stable condition.    Reviewed implications of results, diagnosis, meds, responsibility to follow up, warning signs and symptoms of possible worsening, potential complications and reasons to return to ER.    Patient/Family voiced understanding of above instructions.    Discussed plan for discharge, as there is no emergent indication for admission. Patient referred to primary care provider for BP management due to today's BP. Pt/family is agreeable and understands need for follow up and repeat testing.  Pt is aware that discharge does not mean that nothing is wrong but it indicates no emergency is present that requires admission and they must continue care with follow-up as given below or physician of their choice.     FOLLOW-UP  Jaylan Gleason MD  [address]  [place] [address]  [phone]    Call   As needed, If symptoms worsen         Medication List      New Prescriptions    HYDROcodone-homatropine 5-1.5 MG/5ML syrup  Commonly known as:  HYCODAN  Take 5 mL by mouth Every 4 (Four) Hours As Needed for Cough.              Latest Documented Vital Signs:  As of 5:19 AM  BP- 122/86 HR- 106 Temp- 99.1 °F (37.3 °C) (Oral) O2 sat- 98%    --  Documentation assistance provided by karishma Nelson for Dr. Harrison.  Information recorded by the karishma was done at my direction and has been verified and validated by me.     Mayra Nelson  01/07/19 0519       Obdulio Harrison MD  01/07/19 3859

## 2019-01-07 NOTE — ED NOTES
Pt started Friday with a cough then followed by fatigue on Saturday with chills and fever. Pt states she has been taking Nyquil and Dayquil with no relief. She complains of generalized aching all over but especially with cough. She reports her cough as non productive. She has nasal congestion with clear drainage. She reports an intermittent fever which will get as high as 102. She has decreased appetite as well.     Lisa Millan RN  01/07/19 0420

## 2019-03-04 ENCOUNTER — TRANSCRIBE ORDERS (OUTPATIENT)
Dept: ADMINISTRATIVE | Facility: HOSPITAL | Age: 44
End: 2019-03-04

## 2019-03-04 DIAGNOSIS — E04.1 THYROID NODULE: Primary | ICD-10-CM

## 2019-03-14 ENCOUNTER — HOSPITAL ENCOUNTER (OUTPATIENT)
Dept: ULTRASOUND IMAGING | Facility: HOSPITAL | Age: 44
Discharge: HOME | End: 2019-03-14
Admitting: INTERNAL MEDICINE

## 2019-03-14 DIAGNOSIS — E04.1 THYROID NODULE: ICD-10-CM

## 2019-03-14 PROCEDURE — 76536 US EXAM OF HEAD AND NECK: CPT

## 2019-04-18 ENCOUNTER — OFFICE VISIT (OUTPATIENT)
Dept: CARDIOLOGY | Facility: CLINIC | Age: 44
End: 2019-04-18

## 2019-04-18 VITALS
BODY MASS INDEX: 29.25 KG/M2 | OXYGEN SATURATION: 98 % | WEIGHT: 193 LBS | DIASTOLIC BLOOD PRESSURE: 80 MMHG | RESPIRATION RATE: 16 BRPM | HEART RATE: 81 BPM | SYSTOLIC BLOOD PRESSURE: 122 MMHG | HEIGHT: 68 IN

## 2019-04-18 DIAGNOSIS — I10 ESSENTIAL HYPERTENSION: ICD-10-CM

## 2019-04-18 DIAGNOSIS — R00.2 PALPITATIONS: Primary | ICD-10-CM

## 2019-04-18 DIAGNOSIS — R06.09 DYSPNEA ON EXERTION: ICD-10-CM

## 2019-04-18 PROCEDURE — 99204 OFFICE O/P NEW MOD 45 MIN: CPT | Performed by: INTERNAL MEDICINE

## 2019-04-18 PROCEDURE — 93000 ELECTROCARDIOGRAM COMPLETE: CPT | Performed by: INTERNAL MEDICINE

## 2019-04-18 NOTE — PROGRESS NOTES
"Date of Office Visit: 2019  Encounter Provider: Concha Vazquez MD  Place of Service: [facility] CARDIOLOGY  Patient Name: Radha Robles  :1975    Chief complaint  Consult requested by Dr. Lizarraga for evaluation of hypertension and palpitations.    History of Present Illness  Patient is a 43-year-old female with history of hypertension, dyslipidemia and thyroid nodule..  She has been treated chronically with Diovan over the past 16 years.  She states that approximately 3 years ago her dose was increased and her blood pressures been in the 1 teens to 120s.  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  She has noticed some dyspnea in the setting of PVCs that occurred in the past 1 year.  She states that she gets episodes were occurring several times a week where she will notice palpitations associated with shortness of breath.  At these times she notices bigeminy and trigeminy when she hooked self up to the monitor.  These episodes may last intermittently for several days and subsequently resolve.  She does use Adderall and consumes \"a lot of tea\".  She rarely consumes alcohol.  She does not snore she does not have daytime somnolence or apneic episodes.  She feels well rested in the a.m. when she awakens.     Past Medical History:   Diagnosis Date   • Dyslipidemia    • High blood pressure    • Hypertension    • Menses painful      Past Surgical History:   Procedure Laterality Date   • BREAST AUGMENTATION      ,  ,[facility]    • COLONOSCOPY N/A 10/30/2018    Procedure: COLONOSCOPY TO THE CECUM AND TI WITH COLD BX POLYPECTOMIES;  Surgeon: Kdoy Mendez MD;  Location: [facility] ENDOSCOPY;  Service: Gastroenterology   • WISDOM TOOTH EXTRACTION       Outpatient Medications Prior to Visit   Medication Sig Dispense Refill   • amphetamine-dextroamphetamine (ADDERALL) 15 MG tablet TK 1 T PO  BID  0   • " HYDROcodone-homatropine (HYCODAN) 5-1.5 MG/5ML syrup Take 5 mL by mouth Every 4 (Four) Hours As Needed for Cough. 240 mL 0   • ibuprofen (ADVIL,MOTRIN) 800 MG tablet Take 1 tablet by mouth Every 8 (Eight) Hours As Needed for Mild Pain . 30 tablet 0   • Multiple Vitamins-Minerals (MULTIVITAMIN ADULT PO) Take  by mouth.     • norethindrone-ethinyl estradiol (FEMHRT 1/5) 1-5 MG-MCG tablet TAKE 1 TABLET BY MOUTH DAILY 28 tablet 11   • ondansetron ODT (ZOFRAN ODT) 4 MG disintegrating tablet Take 1 tablet by mouth Every 8 (Eight) Hours As Needed for Nausea or Vomiting. 20 tablet 2   • pantoprazole (PROTONIX) 40 MG EC tablet Take 1 tablet by mouth Daily. 90 tablet 3   • valsartan-hydrochlorothiazide (DIOVAN-HCT) 320-25 MG per tablet TK 1 T PO QD  0     No facility-administered medications prior to visit.        Allergies as of 04/18/2019   • (No Known Allergies)     Social History     Socioeconomic History   • Marital status: Single     Spouse name: Not on file   • Number of children: Not on file   • Years of education: Not on file   • Highest education level: Not on file   Tobacco Use   • Smoking status: Never Smoker   • Smokeless tobacco: Never Used   Substance and Sexual Activity   • Alcohol use: Yes     Frequency: Patient refused     Comment: social   • Drug use: No   • Sexual activity: Yes     Partners: Male     Birth control/protection: None     Family History   Adopted: Yes     Review of Systems   Constitution: Negative for fever.   HENT: Negative for ear pain and sore throat.    Cardiovascular: Negative for chest pain, claudication, leg swelling, orthopnea, paroxysmal nocturnal dyspnea and syncope.   Respiratory: Positive for shortness of breath. Negative for hemoptysis, sputum production and wheezing.    Skin: Negative for rash.   Musculoskeletal: Negative for neck pain.   Gastrointestinal: Negative for abdominal pain and vomiting.   Neurological: Negative for headaches.        Objective:     Vitals:    04/18/19  "0809 04/18/19 0822   BP: 118/84 122/80   BP Location: Left arm Right arm   Patient Position: Sitting Sitting   Cuff Size: Adult Adult   Pulse: 81    Resp: 16    SpO2: 98%    Weight: 87.5 kg (193 lb)    Height: 172.7 cm (67.99\")      Body mass index is 29.35 kg/m².    Physical Exam   Constitutional: She is oriented to person, place, and time. She appears well-developed and well-nourished.   HENT:   Head: Normocephalic.   Nose: Nose normal.   Mouth/Throat: Oropharynx is clear and moist.   Eyes: Conjunctivae and EOM are normal. Pupils are equal, round, and reactive to light. Right eye exhibits no discharge. No scleral icterus.   Neck: Normal range of motion. Neck supple. No JVD present. No thyromegaly present.   Cardiovascular: Normal rate, regular rhythm, normal heart sounds and intact distal pulses. Exam reveals no gallop and no friction rub.   No murmur heard.  Pulses:       Carotid pulses are 2+ on the right side, and 2+ on the left side.       Radial pulses are 2+ on the right side, and 2+ on the left side.        Femoral pulses are 2+ on the right side, and 2+ on the left side.       Popliteal pulses are 2+ on the right side, and 2+ on the left side.        Dorsalis pedis pulses are 2+ on the right side, and 2+ on the left side.        Posterior tibial pulses are 2+ on the right side, and 2+ on the left side.   Pulmonary/Chest: Effort normal and breath sounds normal. No respiratory distress. She has no wheezes. She has no rales.   Abdominal: Soft. Bowel sounds are normal. She exhibits no distension. There is no hepatosplenomegaly. There is no tenderness. There is no rebound.   Musculoskeletal: Normal range of motion. She exhibits no edema or tenderness.   Neurological: She is alert and oriented to person, place, and time.   Skin: Skin is warm and dry. No rash noted. No erythema.   Psychiatric: She has a normal mood and affect. Her behavior is normal. Judgment and thought content normal.   Vitals reviewed.    Lab " Review:     ECG 12 Lead  Date/Time: 4/18/2019 4:45 PM  Performed by: Concha Vazquez MD  Authorized by: Concha Vazquez MD   Comparison: not compared with previous ECG   Rhythm: sinus rhythm  Other findings: non-specific ST-T wave changes    Clinical impression: abnormal EKG          Assessment:       Diagnosis Plan   1. Palpitations  Holter Monitor - 72 Hour Up To 21 Days    ECG 12 Lead   2. Essential hypertension  Adult Stress Echo W/ Cont or Stress Agent if Necessary Per Protocol    ECG 12 Lead   3. Dyspnea on exertion  Adult Stress Echo W/ Cont or Stress Agent if Necessary Per Protocol    ECG 12 Lead     Plan:       1.  Hypertension.  Controlled on the current regimen.  2.  Palpitations.  Will check a Holter and stress echocardiogram.  3.  Dyspnea with palpitations.  Will check a stress echocardiogram to assess for ischemia and structural heart disease  4.  Thyroid nodule.  She plans on getting a biopsy in the near future.       Your medication list           Accurate as of 4/18/19 11:59 PM. If you have any questions, ask your nurse or doctor.               CONTINUE taking these medications      Instructions Last Dose Given Next Dose Due   amphetamine-dextroamphetamine 15 MG tablet  Commonly known as:  ADDERALL      TK 1 T PO  BID       HYDROcodone-homatropine 5-1.5 MG/5ML syrup  Commonly known as:  HYCODAN      Take 5 mL by mouth Every 4 (Four) Hours As Needed for Cough.       ibuprofen 800 MG tablet  Commonly known as:  ADVIL,MOTRIN      Take 1 tablet by mouth Every 8 (Eight) Hours As Needed for Mild Pain .       MULTIVITAMIN ADULT PO      Take  by mouth.       norethindrone-ethinyl estradiol 1-5 MG-MCG tablet  Commonly known as:  FEMHRT 1/5      TAKE 1 TABLET BY MOUTH DAILY       ondansetron ODT 4 MG disintegrating tablet  Commonly known as:  ZOFRAN ODT      Take 1 tablet by mouth Every 8 (Eight) Hours As Needed for Nausea or Vomiting.       pantoprazole 40 MG EC tablet  Commonly known as:  PROTONIX      Take 1  tablet by mouth Daily.       valsartan-hydrochlorothiazide 320-25 MG per tablet  Commonly known as:  DIOVAN-HCT      TK 1 T PO QD              Patient is no longer taking -.  I corrected the med list to reflect this.  I did not stop these medications.    Dictated utilizing Dragon dictation

## 2019-04-25 DIAGNOSIS — R06.09 DYSPNEA ON EXERTION: ICD-10-CM

## 2019-04-25 DIAGNOSIS — R00.2 PALPITATIONS: Primary | ICD-10-CM

## 2019-04-26 ENCOUNTER — APPOINTMENT (OUTPATIENT)
Dept: CARDIOLOGY | Facility: HOSPITAL | Age: 44
End: 2019-04-26

## 2019-04-27 PROBLEM — R00.2 PALPITATIONS: Status: ACTIVE | Noted: 2019-04-27

## 2019-04-27 PROBLEM — I10 ESSENTIAL HYPERTENSION: Status: ACTIVE | Noted: 2019-04-27

## 2019-04-27 PROBLEM — R06.09 DYSPNEA ON EXERTION: Status: ACTIVE | Noted: 2019-04-27

## 2019-05-21 ENCOUNTER — OFFICE VISIT (OUTPATIENT)
Dept: ENDOCRINOLOGY | Age: 44
End: 2019-05-21

## 2019-05-21 ENCOUNTER — TELEPHONE (OUTPATIENT)
Dept: CARDIOLOGY | Facility: CLINIC | Age: 44
End: 2019-05-21

## 2019-05-21 VITALS
DIASTOLIC BLOOD PRESSURE: 66 MMHG | WEIGHT: 195 LBS | HEIGHT: 68 IN | BODY MASS INDEX: 29.55 KG/M2 | SYSTOLIC BLOOD PRESSURE: 116 MMHG

## 2019-05-21 DIAGNOSIS — L65.9 ALOPECIA: ICD-10-CM

## 2019-05-21 DIAGNOSIS — I10 ESSENTIAL HYPERTENSION: ICD-10-CM

## 2019-05-21 DIAGNOSIS — E03.8 HYPOTHYROIDISM DUE TO HASHIMOTO'S THYROIDITIS: Primary | ICD-10-CM

## 2019-05-21 DIAGNOSIS — E06.3 HYPOTHYROIDISM DUE TO HASHIMOTO'S THYROIDITIS: Primary | ICD-10-CM

## 2019-05-21 DIAGNOSIS — E04.2 NONTOXIC MULTINODULAR GOITER: ICD-10-CM

## 2019-05-21 DIAGNOSIS — L65.9 HAIR LOSS: ICD-10-CM

## 2019-05-21 DIAGNOSIS — E55.9 VITAMIN D DEFICIENCY: ICD-10-CM

## 2019-05-21 DIAGNOSIS — R06.09 DYSPNEA ON EXERTION: ICD-10-CM

## 2019-05-21 PROCEDURE — 99204 OFFICE O/P NEW MOD 45 MIN: CPT | Performed by: INTERNAL MEDICINE

## 2019-05-21 RX ORDER — SPIRONOLACTONE 100 MG/1
100 TABLET, FILM COATED ORAL 2 TIMES DAILY
Qty: 180 TABLET | Refills: 3 | Status: SHIPPED | OUTPATIENT
Start: 2019-05-21 | End: 2020-06-18

## 2019-05-21 RX ORDER — SPIRONOLACTONE 100 MG/1
100 TABLET, FILM COATED ORAL 2 TIMES DAILY
Qty: 180 TABLET | Refills: 3 | Status: SHIPPED | OUTPATIENT
Start: 2019-05-21 | End: 2019-05-21 | Stop reason: SDUPTHER

## 2019-05-21 NOTE — TELEPHONE ENCOUNTER
Regarding: Non-Urgent Medical Question  Contact: [phone]  ----- Message from [employer] sent at 5/21/2019  6:02 AM EDT -----    I have a question about ZIO Patch resulted on 5/11/19, 10:38 PM.    Good morning,   Can I get the results of this test before my October 7 appointment?     Thank you,   Radha

## 2019-05-21 NOTE — PROGRESS NOTES
"Kamala Robles is a 44 y.o. female is here as a new patient for hypothyroidism. LAb review. Patient denies any issues or concerns. /66   Ht 172.7 cm (68\")   Wt 88.5 kg (195 lb)   BMI 29.65 kg/m²   No Known Allergies    Current Outpatient Medications:   •  amphetamine-dextroamphetamine (ADDERALL) 15 MG tablet, TK 1 T PO  BID, Disp: , Rfl: 0  •  norethindrone-ethinyl estradiol (FEMHRT 1/5) 1-5 MG-MCG tablet, TAKE 1 TABLET BY MOUTH DAILY, Disp: 28 tablet, Rfl: 11  •  ondansetron ODT (ZOFRAN ODT) 4 MG disintegrating tablet, Take 1 tablet by mouth Every 8 (Eight) Hours As Needed for Nausea or Vomiting., Disp: 20 tablet, Rfl: 2  •  pantoprazole (PROTONIX) 40 MG EC tablet, Take 1 tablet by mouth Daily., Disp: 90 tablet, Rfl: 3  •  valsartan-hydrochlorothiazide (DIOVAN-HCT) 320-25 MG per tablet, TK 1 T PO QD, Disp: , Rfl: 0      History of Present Illness this is a 44-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  She says during her annual physical Dr. Gleason discovered the presence of a goiter and a subsequent ultrasound of her thyroid on March 4, 2019 revealed the presence of a mixed solid and cystic nodules and was recommended to have fine-needle aspiration and but that has yet to happen.  She is also a known patient with hypertension and gastroesophageal reflux disease and dysmenorrhea for which she is taking birth control..  She is  but by choice have not become pregnant or have any children.  Her family history is unknown as she was adopted.  She is also complaining of fatigue and low energy and hair loss from her scalp and near total lack of libido to the point that if her  does not initiate intercourse it will never happen.  Also has some degree of dyspareunia but climaxes with no problem.    The following portions " of the patient's history were reviewed and updated as appropriate: allergies, current medications, past family history, past medical history, past social history, past surgical history and problem list.    Review of Systems   Constitutional: Negative for fatigue.   HENT: Negative for trouble swallowing.    Eyes: Negative for visual disturbance.   Cardiovascular: Negative for leg swelling.   Endocrine: Negative for polyuria.   Skin: Negative for wound.   Neurological: Negative for numbness.       Objective   Physical Exam   Constitutional: She is oriented to person, place, and time. She appears well-developed and well-nourished. No distress.   HENT:   Head: Normocephalic and atraumatic.   Right Ear: External ear normal.   Left Ear: External ear normal.   Nose: Nose normal.   Mouth/Throat: Oropharynx is clear and moist. No oropharyngeal exudate.   Eyes: Conjunctivae and EOM are normal. Pupils are equal, round, and reactive to light. Right eye exhibits no discharge. Left eye exhibits no discharge. No scleral icterus.   Neck: Normal range of motion. Neck supple. No JVD present. No tracheal deviation present. Thyromegaly present.   Nonhomogeneous enlargement of her thyroid gland.  The entire goiter moves freely with swallowing and is not associated with any lymphadenopathy in the anterior or posterior cervical or supraclavicular area.   Cardiovascular: Normal rate, regular rhythm, normal heart sounds and intact distal pulses. Exam reveals no gallop and no friction rub.   No murmur heard.  Pulmonary/Chest: Effort normal and breath sounds normal. No stridor. No respiratory distress. She has no wheezes. She has no rales. She exhibits no tenderness.   Abdominal: Soft. Bowel sounds are normal. She exhibits no distension and no mass. There is no tenderness. There is no rebound and no guarding. No hernia.   Musculoskeletal: Normal range of motion. She exhibits no edema, tenderness or deformity.   Lymphadenopathy:     She has no  cervical adenopathy.   Neurological: She is alert and oriented to person, place, and time. She has normal reflexes. She displays normal reflexes. No cranial nerve deficit or sensory deficit. She exhibits normal muscle tone. Coordination normal.   Skin: Skin is warm and dry. No rash noted. She is not diaphoretic. No erythema. No pallor.   Male pattern  baldness in her forehead.   Psychiatric: She has a normal mood and affect. Her behavior is normal. Judgment and thought content normal.   Nursing note and vitals reviewed.        Assessment/Plan   Diagnoses and all orders for this visit:    Hypothyroidism due to Hashimoto's thyroiditis  -     T3, Free  -     T4 & TSH ([facility])  -     T4, Free  -     Thyroglobulin With Anti-TG  -     Uric Acid  -     Vitamin D 25 Hydroxy  -     Cancel: Comprehensive Metabolic Panel  -     C-Peptide  -     Follicle Stimulating Hormone  -     Hemoglobin A1c  -     Lipid Panel  -     Luteinizing Hormone  -     Prolactin  -     ACTH  -     Cortisol  -     DHEA  -     DHEA-Sulfate  -     TestT+TestF+SHBG  -     Calcium, Ionized  -     Calcitonin  -     PTH, Intact  -     Phosphorus  -     Comprehensive Metabolic Panel    Essential hypertension  -     T3, Free  -     T4 & TSH ([facility])  -     T4, Free  -     Thyroglobulin With Anti-TG  -     Uric Acid  -     Vitamin D 25 Hydroxy  -     Cancel: Comprehensive Metabolic Panel  -     C-Peptide  -     Follicle Stimulating Hormone  -     Hemoglobin A1c  -     Lipid Panel  -     Luteinizing Hormone  -     Prolactin  -     ACTH  -     Cortisol  -     DHEA  -     DHEA-Sulfate  -     TestT+TestF+SHBG  -     Calcium, Ionized  -     Calcitonin  -     PTH, Intact  -     Phosphorus  -     Comprehensive Metabolic Panel    Vitamin D deficiency  -     T3, Free  -     T4 & TSH ([facility])  -     T4, Free  -     Thyroglobulin With Anti-TG  -     Uric Acid  -     Vitamin D 25 Hydroxy  -     Cancel: Comprehensive Metabolic Panel  -     C-Peptide  -     Follicle  Stimulating Hormone  -     Hemoglobin A1c  -     Lipid Panel  -     Luteinizing Hormone  -     Prolactin  -     ACTH  -     Cortisol  -     DHEA  -     DHEA-Sulfate  -     TestT+TestF+SHBG  -     Calcium, Ionized  -     Calcitonin  -     PTH, Intact  -     Phosphorus  -     Comprehensive Metabolic Panel    Alopecia  -     T3, Free  -     T4 & TSH ([facility])  -     T4, Free  -     Thyroglobulin With Anti-TG  -     Uric Acid  -     Vitamin D 25 Hydroxy  -     Cancel: Comprehensive Metabolic Panel  -     C-Peptide  -     Follicle Stimulating Hormone  -     Hemoglobin A1c  -     Lipid Panel  -     Luteinizing Hormone  -     Prolactin  -     ACTH  -     Cortisol  -     DHEA  -     DHEA-Sulfate  -     TestT+TestF+SHBG  -     Calcium, Ionized  -     Calcitonin  -     PTH, Intact  -     Phosphorus  -     Comprehensive Metabolic Panel    Dyspnea on exertion  -     T3, Free  -     T4 & TSH ([facility])  -     T4, Free  -     Thyroglobulin With Anti-TG  -     Uric Acid  -     Vitamin D 25 Hydroxy  -     Cancel: Comprehensive Metabolic Panel  -     C-Peptide  -     Follicle Stimulating Hormone  -     Hemoglobin A1c  -     Lipid Panel  -     Luteinizing Hormone  -     Prolactin  -     ACTH  -     Cortisol  -     DHEA  -     DHEA-Sulfate  -     TestT+TestF+SHBG  -     Calcium, Ionized  -     Calcitonin  -     PTH, Intact  -     Phosphorus  -     Comprehensive Metabolic Panel    Hair loss  -     T3, Free  -     T4 & TSH ([facility])  -     T4, Free  -     Thyroglobulin With Anti-TG  -     Uric Acid  -     Vitamin D 25 Hydroxy  -     Cancel: Comprehensive Metabolic Panel  -     C-Peptide  -     Follicle Stimulating Hormone  -     Hemoglobin A1c  -     Lipid Panel  -     Luteinizing Hormone  -     Prolactin  -     ACTH  -     Cortisol  -     DHEA  -     DHEA-Sulfate  -     TestT+TestF+SHBG  -     Calcium, Ionized  -     Calcitonin  -     PTH, Intact  -     Phosphorus  -     Comprehensive Metabolic Panel    Nontoxic multinodular goiter  -      US Thyroid; Future    Other orders  -     spironolactone (ALDACTONE) 100 MG tablet; Take 1 tablet by mouth 2 (Two) Times a Day.        In summary I saw and examined this 44-year-old female for above-mentioned problems.  I reviewed her laboratory evaluation of April 5, 2019 as well as report of her thyroid ultrasound on March 4, 2019 and at this time we will go ahead and order extensive laboratory evaluation and once the results come back we will go ahead and call for any possible modification or new medications.  At this time I am going to go ahead and start her on Aldactone 100 mg twice daily for her alopecia.  I also asked her to monitor her blood pressure and if it is dropping low she can discontinue her current Diovan HCT medication for blood pressure.  I also based on the recommendation of radiology wanted to send her for a fine-needle aspiration biopsy but she said that because to her it feels to be smaller she would rather have another ultrasound before a final decision on fine-needle aspiration is made.  She will see Ms. Alis Garcia in 4 months or sooner if needed with laboratory evaluation prior to each office visit.

## 2019-05-23 ENCOUNTER — APPOINTMENT (OUTPATIENT)
Dept: CARDIOLOGY | Facility: HOSPITAL | Age: 44
End: 2019-05-23

## 2019-05-23 NOTE — TELEPHONE ENCOUNTER
Please let her know that the Ziopatch was normal.  She had multiple episodes of symptoms but none were associated with any arrhythmia.

## 2019-06-03 ENCOUNTER — TRANSCRIBE ORDERS (OUTPATIENT)
Dept: PHYSICAL THERAPY | Facility: HOSPITAL | Age: 44
End: 2019-06-03

## 2019-06-03 DIAGNOSIS — S46.911A RIGHT SHOULDER STRAIN, INITIAL ENCOUNTER: ICD-10-CM

## 2019-06-03 DIAGNOSIS — S16.1XXA CERVICAL STRAIN, INITIAL ENCOUNTER: Primary | ICD-10-CM

## 2019-06-03 DIAGNOSIS — M54.50 LUMBAR PAIN: ICD-10-CM

## 2019-06-03 DIAGNOSIS — S76.011A HIP STRAIN, RIGHT, INITIAL ENCOUNTER: ICD-10-CM

## 2019-06-04 ENCOUNTER — HOSPITAL ENCOUNTER (OUTPATIENT)
Dept: ULTRASOUND IMAGING | Facility: HOSPITAL | Age: 44
Discharge: HOME | End: 2019-06-04
Admitting: INTERNAL MEDICINE

## 2019-06-04 ENCOUNTER — APPOINTMENT (OUTPATIENT)
Dept: LAB | Facility: HOSPITAL | Age: 44
End: 2019-06-04

## 2019-06-04 DIAGNOSIS — E04.2 NONTOXIC MULTINODULAR GOITER: ICD-10-CM

## 2019-06-04 LAB
25(OH)D3 SERPL-MCNC: 37.5 NG/ML (ref 30–100)
ALBUMIN SERPL-MCNC: 4.6 G/DL (ref 3.5–5.2)
ALBUMIN/GLOB SERPL: 1.5 G/DL
ALP SERPL-CCNC: 78 U/L (ref 39–117)
ALT SERPL W P-5'-P-CCNC: 16 U/L (ref 1–33)
ANION GAP SERPL CALCULATED.3IONS-SCNC: 11.7 MMOL/L
AST SERPL-CCNC: 14 U/L (ref 1–32)
BILIRUB SERPL-MCNC: 0.3 MG/DL (ref 0.2–1.2)
BUN BLD-MCNC: 10 MG/DL (ref 6–20)
BUN/CREAT SERPL: 12 (ref 7–25)
CA-I BLD-MCNC: 5.8 MG/DL (ref 4.6–5.4)
CA-I SERPL ISE-MCNC: 1.45 MMOL/L (ref 1.15–1.35)
CALCIUM SPEC-SCNC: 10.1 MG/DL (ref 8.6–10.5)
CHLORIDE SERPL-SCNC: 98 MMOL/L (ref 98–107)
CHOLEST SERPL-MCNC: 128 MG/DL (ref 0–200)
CO2 SERPL-SCNC: 24.3 MMOL/L (ref 22–29)
CORTIS SERPL-MCNC: 9.41 MCG/DL
CREAT BLD-MCNC: 0.83 MG/DL (ref 0.57–1)
FSH SERPL-ACNC: 6.73 MIU/ML
GFR SERPL CREATININE-BSD FRML MDRD: 74 ML/MIN/1.73
GLOBULIN UR ELPH-MCNC: 3.1 GM/DL
GLUCOSE BLD-MCNC: 90 MG/DL (ref 65–99)
HBA1C MFR BLD: 4.9 % (ref 4.8–5.6)
HDLC SERPL-MCNC: 44 MG/DL (ref 40–60)
LDLC SERPL CALC-MCNC: 67 MG/DL (ref 0–100)
LDLC/HDLC SERPL: 1.52 {RATIO}
LH SERPL-ACNC: 4.3 MIU/ML
PHOSPHATE SERPL-MCNC: 3.3 MG/DL (ref 2.5–4.5)
POTASSIUM BLD-SCNC: 4.5 MMOL/L (ref 3.5–5.2)
PROLACTIN SERPL-MCNC: 8.84 NG/ML (ref 4.79–23.3)
PROT SERPL-MCNC: 7.7 G/DL (ref 6–8.5)
PTH-INTACT SERPL-MCNC: 37.6 PG/ML (ref 15–65)
SODIUM BLD-SCNC: 134 MMOL/L (ref 136–145)
T3FREE SERPL-MCNC: 3.47 PG/ML (ref 2–4.4)
T4 FREE SERPL-MCNC: 1.34 NG/DL (ref 0.93–1.7)
TRIGL SERPL-MCNC: 85 MG/DL (ref 0–150)
URATE SERPL-MCNC: 4.6 MG/DL (ref 2.4–5.7)
VLDLC SERPL-MCNC: 17 MG/DL (ref 5–40)

## 2019-06-04 PROCEDURE — 83001 ASSAY OF GONADOTROPIN (FSH): CPT | Performed by: INTERNAL MEDICINE

## 2019-06-04 PROCEDURE — 82024 ASSAY OF ACTH: CPT | Performed by: INTERNAL MEDICINE

## 2019-06-04 PROCEDURE — 82627 DEHYDROEPIANDROSTERONE: CPT | Performed by: INTERNAL MEDICINE

## 2019-06-04 PROCEDURE — 83036 HEMOGLOBIN GLYCOSYLATED A1C: CPT | Performed by: INTERNAL MEDICINE

## 2019-06-04 PROCEDURE — 86800 THYROGLOBULIN ANTIBODY: CPT | Performed by: INTERNAL MEDICINE

## 2019-06-04 PROCEDURE — 84550 ASSAY OF BLOOD/URIC ACID: CPT | Performed by: INTERNAL MEDICINE

## 2019-06-04 PROCEDURE — 84100 ASSAY OF PHOSPHORUS: CPT | Performed by: INTERNAL MEDICINE

## 2019-06-04 PROCEDURE — 83002 ASSAY OF GONADOTROPIN (LH): CPT | Performed by: INTERNAL MEDICINE

## 2019-06-04 PROCEDURE — 80061 LIPID PANEL: CPT | Performed by: INTERNAL MEDICINE

## 2019-06-04 PROCEDURE — 82330 ASSAY OF CALCIUM: CPT | Performed by: INTERNAL MEDICINE

## 2019-06-04 PROCEDURE — 80053 COMPREHEN METABOLIC PANEL: CPT | Performed by: INTERNAL MEDICINE

## 2019-06-04 PROCEDURE — 82626 DEHYDROEPIANDROSTERONE: CPT | Performed by: INTERNAL MEDICINE

## 2019-06-04 PROCEDURE — 84481 FREE ASSAY (FT-3): CPT | Performed by: INTERNAL MEDICINE

## 2019-06-04 PROCEDURE — 84402 ASSAY OF FREE TESTOSTERONE: CPT | Performed by: INTERNAL MEDICINE

## 2019-06-04 PROCEDURE — 36415 COLL VENOUS BLD VENIPUNCTURE: CPT | Performed by: INTERNAL MEDICINE

## 2019-06-04 PROCEDURE — 84432 ASSAY OF THYROGLOBULIN: CPT | Performed by: INTERNAL MEDICINE

## 2019-06-04 PROCEDURE — 84436 ASSAY OF TOTAL THYROXINE: CPT | Performed by: INTERNAL MEDICINE

## 2019-06-04 PROCEDURE — 84443 ASSAY THYROID STIM HORMONE: CPT | Performed by: INTERNAL MEDICINE

## 2019-06-04 PROCEDURE — 84146 ASSAY OF PROLACTIN: CPT | Performed by: INTERNAL MEDICINE

## 2019-06-04 PROCEDURE — 84681 ASSAY OF C-PEPTIDE: CPT | Performed by: INTERNAL MEDICINE

## 2019-06-04 PROCEDURE — 82308 ASSAY OF CALCITONIN: CPT | Performed by: INTERNAL MEDICINE

## 2019-06-04 PROCEDURE — 82533 TOTAL CORTISOL: CPT | Performed by: INTERNAL MEDICINE

## 2019-06-04 PROCEDURE — 84439 ASSAY OF FREE THYROXINE: CPT | Performed by: INTERNAL MEDICINE

## 2019-06-04 PROCEDURE — 82306 VITAMIN D 25 HYDROXY: CPT | Performed by: INTERNAL MEDICINE

## 2019-06-04 PROCEDURE — 84403 ASSAY OF TOTAL TESTOSTERONE: CPT | Performed by: INTERNAL MEDICINE

## 2019-06-04 PROCEDURE — 76536 US EXAM OF HEAD AND NECK: CPT

## 2019-06-04 PROCEDURE — 84270 ASSAY OF SEX HORMONE GLOBUL: CPT | Performed by: INTERNAL MEDICINE

## 2019-06-04 PROCEDURE — 83970 ASSAY OF PARATHORMONE: CPT | Performed by: INTERNAL MEDICINE

## 2019-06-05 LAB
ACTH PLAS-MCNC: 39.2 PG/ML (ref 7.2–63.3)
C PEPTIDE SERPL-MCNC: 3 NG/ML (ref 1.1–4.4)
DHEA-S SERPL-MCNC: 97.3 UG/DL (ref 41.2–243.7)
T4 SERPL-MCNC: 9.7 UG/DL (ref 4.5–12)
THYROGLOB AB SERPL-ACNC: <1 IU/ML (ref 0–0.9)
THYROGLOBULIN BY IMA: 16.2 NG/ML (ref 1.5–38.5)
TSH SERPL-ACNC: 0.94 UIU/ML (ref 0.45–4.5)

## 2019-06-06 ENCOUNTER — HOSPITAL ENCOUNTER (OUTPATIENT)
Dept: PHYSICAL THERAPY | Facility: HOSPITAL | Age: 44
Setting detail: THERAPIES SERIES
Discharge: HOME | End: 2019-06-06

## 2019-06-06 DIAGNOSIS — M79.604 RIGHT LEG PAIN: Primary | ICD-10-CM

## 2019-06-06 DIAGNOSIS — M25.511 ACUTE PAIN OF RIGHT SHOULDER: ICD-10-CM

## 2019-06-06 LAB
SHBG SERPL-SCNC: 39.9 NMOL/L (ref 24.6–122)
TESTOST FREE SERPL-MCNC: 0.9 PG/ML (ref 0–4.2)
TESTOST SERPL-MCNC: 4 NG/DL (ref 8–48)

## 2019-06-06 PROCEDURE — 97110 THERAPEUTIC EXERCISES: CPT

## 2019-06-06 PROCEDURE — 97162 PT EVAL MOD COMPLEX 30 MIN: CPT
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
## 2019-06-07 LAB
CALCIT SERPL-MCNC: <2 PG/ML (ref 0–5)
DHEA SERPL-MCNC: 154 NG/DL (ref 31–701)

## 2019-06-12 ENCOUNTER — HOSPITAL ENCOUNTER (OUTPATIENT)
Dept: PHYSICAL THERAPY | Facility: HOSPITAL | Age: 44
Setting detail: THERAPIES SERIES
Discharge: HOME | End: 2019-06-12

## 2019-06-12 DIAGNOSIS — M25.511 ACUTE PAIN OF RIGHT SHOULDER: ICD-10-CM

## 2019-06-12 DIAGNOSIS — M79.604 RIGHT LEG PAIN: Primary | ICD-10-CM

## 2019-06-12 PROCEDURE — 97110 THERAPEUTIC EXERCISES: CPT

## 2019-06-12 NOTE — THERAPY TREATMENT NOTE
Outpatient Physical Therapy Ortho Treatment Note  [facility]     Patient Name: Radha Robles  : 1975  MRN: [id number]  Today's Date: 2019      Visit Date: 2019    Visit Dx:    ICD-10-CM ICD-9-CM   1. Right leg pain M79.604 729.5   2. Acute pain of right shoulder M25.511 719.41       Patient Active Problem List   Diagnosis   • Hair loss   • Encounter for screening for malignant neoplasm of colon   • Palpitations   • Dyspnea on exertion   • Essential hypertension   • Depression   • Vitamin D deficiency   • Hypothyroidism due to Hashimoto's thyroiditis   • Nontoxic multinodular goiter        Past Medical History:   Diagnosis Date   • Dyslipidemia    • High blood pressure    • Hypertension    • Hypothyroidism due to Hashimoto's thyroiditis 2019   • Menses painful         Past Surgical History:   Procedure Laterality Date   [facility]    • COLONOSCOPY N/A 10/30/2018    Procedure: COLONOSCOPY TO THE CECUM AND TI WITH COLD BX POLYPECTOMIES;  Surgeon: Kody Mendez MD;  Location: [facility] ENDOSCOPY;  Service: Gastroenterology   • WISDOM TOOTH EXTRACTION                         PT Assessment/Plan     Row Name 19 1400          PT Assessment    Assessment Comments  Ms. Holt presents today for first f/u since initial eval. She reports good compliance with HEP, although no change in s/s. Continued with progression of gentle core stability and hip strengthening.   -CA        PT Plan    PT Plan Comments  Assess response to update HEP, initiate gentle postural exercises to address neck and shoulder pain, such as chin tuck, scap squeeze, row  -CA       User Key  (r) = Recorded By, (t) = Taken By, (c) = Cosigned By    Initials Name Provider Type    Michelle Trujillo, PT Physical Therapist            Exercises     Row Name 19 1400             Subjective Comments    Subjective Comments  I did the exercises, they didn't make the pain worse but I'm  not sure if they're helping.  -CA         Subjective Pain    Able to rate subjective pain?  yes  -CA      Pre-Treatment Pain Level  4  -CA         Total Minutes    18021 - PT Therapeutic Exercise Minutes  40  -CA         Exercise 1    Exercise Name 1  LTR  -CA      Reps 1  20  -CA         Exercise 2    Exercise Name 2  SKTC  -CA      Reps 2  3  -CA      Time 2  20  -CA         Exercise 3    Exercise Name 3  piriformis stretch  -CA      Reps 3  3  -CA      Time 3  20  -CA         Exercise 4    Exercise Name 4  hip adduction isometric + PPT  -CA      Reps 4  20  -CA      Time 4  5  -CA         Exercise 5    Exercise Name 5  glute set  -CA      Reps 5  10  -CA      Time 5  5  -CA         Exercise 6    Exercise Name 6  Nu Step Lvl 1  -CA      Time 6  5 min  -CA         Exercise 7    Exercise Name 7  HS stretch on steps  -CA      Cueing 7  Verbal  -CA      Reps 7  3  -CA      Time 7  20s  -CA         Exercise 8    Exercise Name 8  HL hip abd  -CA      Cueing 8  Verbal  -CA      Sets 8  2  -CA      Reps 8  10  -CA         Exercise 9    Exercise Name 9  bridge  -CA      Cueing 9  Verbal  -CA      Sets 9  --  -CA      Reps 9  10  -CA      Additional Comments  w/ ball and adduction/PPT  -CA         Exercise 10    Exercise Name 10  clams  -CA      Cueing 10  Verbal  -CA      Sets 10  2  -CA      Reps 10  10  -CA      Additional Comments  B  -CA         Exercise 11    Exercise Name 11  qped LE knee lifts with TA activation  -CA      Cueing 11  Verbal;Tactile  -CA      Reps 11  10  B  -CA      Additional Comments  cues to avoid hips shifting  -CA         Exercise 12    Exercise Name 12  cat/camel  -CA      Cueing 12  Verbal  -CA      Reps 12  10 x 5 s ea.  -CA        User Key  (r) = Recorded By, (t) = Taken By, (c) = Cosigned By    Initials Name Provider Type    CA Michelle Jalloh PT Physical Therapist                                          Time Calculation:   Start Time: 1355  Stop Time: 1435  Time Calculation (min): 40  min  Total Timed Code Minutes- PT: 40 minute(s)  Therapy Charges for Today     Code Description Service Date Service Provider Modifiers Qty    37899582462 HC PT THER PROC EA 15 MIN 6/12/2019 Michelle Jalloh, PT GP 3                    Michelle Jalloh, PT  6/12/2019

## 2019-06-14 ENCOUNTER — HOSPITAL ENCOUNTER (OUTPATIENT)
Dept: PHYSICAL THERAPY | Facility: HOSPITAL | Age: 44
Setting detail: THERAPIES SERIES
Discharge: HOME | End: 2019-06-14

## 2019-06-14 DIAGNOSIS — M25.511 ACUTE PAIN OF RIGHT SHOULDER: ICD-10-CM

## 2019-06-14 DIAGNOSIS — M79.604 RIGHT LEG PAIN: Primary | ICD-10-CM

## 2019-06-14 PROCEDURE — 97110 THERAPEUTIC EXERCISES: CPT

## 2019-06-14 NOTE — THERAPY TREATMENT NOTE
Outpatient Physical Therapy Ortho Treatment Note  [facility]     Patient Name: Radha Robles  : 1975  MRN: [id number]  Today's Date: 2019      Visit Date: 2019    Visit Dx:    ICD-10-CM ICD-9-CM   1. Right leg pain M79.604 729.5   2. Acute pain of right shoulder M25.511 719.41       Patient Active Problem List   Diagnosis   • Hair loss   • Encounter for screening for malignant neoplasm of colon   • Palpitations   • Dyspnea on exertion   • Essential hypertension   • Depression   • Vitamin D deficiency   • Hypothyroidism due to Hashimoto's thyroiditis   • Nontoxic multinodular goiter        Past Medical History:   Diagnosis Date   • Dyslipidemia    • High blood pressure    • Hypertension    • Hypothyroidism due to Hashimoto's thyroiditis 2019   • Menses painful         Past Surgical History:   Procedure Laterality Date   • BREAST AUGMENTATION      ,  ,[facility]    • COLONOSCOPY N/A 10/30/2018    Procedure: COLONOSCOPY TO THE CECUM AND TI WITH COLD BX POLYPECTOMIES;  Surgeon: Kody Mendez MD;  Location: [facility] ENDOSCOPY;  Service: Gastroenterology   • WISDOM TOOTH EXTRACTION                         PT Assessment/Plan     Row Name 19 0900          PT Assessment    Assessment Comments  Pt returns today reporting decreased pain since last visit. Continued progression of gentle core stability and addressed postural strengthening and thoracic mobility in decompressed position.   -CA        PT Plan    PT Plan Comments  Consider progression to standing ther ex per pt tolerance.  -CA       User Key  (r) = Recorded By, (t) = Taken By, (c) = Cosigned By    Initials Name Provider Type    Michelle Trujillo, PT Physical Therapist            Exercises     Row Name 19 0800             Subjective Comments    Subjective Comments  I was a little sore after last visit, but now I'm starting to feel better.  -CA         Subjective Pain    Able to rate subjective pain?  yes  -CA       Pre-Treatment Pain Level  2  -CA         Total Minutes    81953 - PT Therapeutic Exercise Minutes  40  -CA         Exercise 3    Exercise Name 3  piriformis stretch  -CA      Reps 3  3  -CA      Time 3  20  -CA         Exercise 4    Exercise Name 4  hip adduction isometric + PPT  -CA      Reps 4  20  -CA      Time 4  5  -CA         Exercise 6    Exercise Name 6  Nu Step Lvl 1  -CA      Time 6  5 min  -CA         Exercise 7    Exercise Name 7  HS stretch on steps  -CA      Cueing 7  Verbal  -CA      Reps 7  3  -CA      Time 7  20s  -CA         Exercise 9    Exercise Name 9  bridge  -CA      Cueing 9  Verbal  -CA      Reps 9  10  -CA      Additional Comments  small range  -CA         Exercise 10    Exercise Name 10  clams  -CA      Cueing 10  Verbal  -CA      Sets 10  2  -CA      Reps 10  10  -CA      Additional Comments  B  -CA         Exercise 13    Exercise Name 13  SL thoracic arc  -CA      Cueing 13  Verbal  -CA      Reps 13  10  -CA      Time 13  10s  -CA         Exercise 14    Exercise Name 14  supine HA  -CA      Cueing 14  Verbal  -CA      Sets 14  2  -CA      Reps 14  10  -CA      Additional Comments  RTB  -CA        User Key  (r) = Recorded By, (t) = Taken By, (c) = Cosigned By    Initials Name Provider Type    CA Michelle Jalloh, PT Physical Therapist                                          Time Calculation:   Start Time: 0830  Stop Time: 0910  Time Calculation (min): 40 min  Total Timed Code Minutes- PT: 40 minute(s)  Therapy Charges for Today     Code Description Service Date Service Provider Modifiers Qty    [id number]  PT THER PROC EA 15 MIN 6/14/2019 Michelle Jalloh, PT GP 3                    Michelle Jalloh, PT  6/14/2019

## 2019-06-19 ENCOUNTER — HOSPITAL ENCOUNTER (OUTPATIENT)
Dept: PHYSICAL THERAPY | Facility: HOSPITAL | Age: 44
Setting detail: THERAPIES SERIES
Discharge: HOME | End: 2019-06-19

## 2019-06-19 DIAGNOSIS — M79.604 RIGHT LEG PAIN: Primary | ICD-10-CM

## 2019-06-19 DIAGNOSIS — M25.511 ACUTE PAIN OF RIGHT SHOULDER: ICD-10-CM

## 2019-06-19 PROCEDURE — 97110 THERAPEUTIC EXERCISES: CPT

## 2019-06-19 NOTE — THERAPY TREATMENT NOTE
Outpatient Physical Therapy Ortho Treatment Note  [facility]     Patient Name: Radha Robles  : 1975  MRN: [id number]  Today's Date: 2019      Visit Date: 2019    Visit Dx:    ICD-10-CM ICD-9-CM   1. Right leg pain M79.604 729.5   2. Acute pain of right shoulder M25.511 719.41       Patient Active Problem List   Diagnosis   • Hair loss   • Encounter for screening for malignant neoplasm of colon   • Palpitations   • Dyspnea on exertion   • Essential hypertension   • Depression   • Vitamin D deficiency   • Hypothyroidism due to Hashimoto's thyroiditis   • Nontoxic multinodular goiter        Past Medical History:   Diagnosis Date   • Dyslipidemia    • High blood pressure    • Hypertension    • Hypothyroidism due to Hashimoto's thyroiditis 2019   • Menses painful         Past Surgical History:   Procedure Laterality Date   • BREAST AUGMENTATION      ,  ,[facility]    • COLONOSCOPY N/A 10/30/2018    Procedure: COLONOSCOPY TO THE CECUM AND TI WITH COLD BX POLYPECTOMIES;  Surgeon: Kody Mendez MD;  Location: [facility] ENDOSCOPY;  Service: Gastroenterology   • WISDOM TOOTH EXTRACTION                         PT Assessment/Plan     Row Name 19 1052          PT Assessment    Assessment Comments  Pt continues to progress well with dec pain continuing through weekend and 0/10 pain today. Pt continues to mention fear of return to work at previous position. Physical sympmtoms seem to be improving and focused on stabilization and strengthening today.     -LB        PT Plan    PT Plan Comments  Continue stabilization training in functional position.   -LB       User Key  (r) = Recorded By, (t) = Taken By, (c) = Cosigned By    Initials Name Provider Type    Nadege Bravo, PT Physical Therapist            Exercises     Row Name 19 1000             Subjective Comments    Subjective Comments  I am doing much better. I have almost had no pain over last few days.  -LB          Subjective Pain    Able to rate subjective pain?  yes  -LB      Pre-Treatment Pain Level  0  -LB         Total Minutes    60178 - PT Therapeutic Exercise Minutes  40  -LB         Exercise 1    Exercise Name 1  D2 flexion supine   -LB      Reps 1  12  -LB      Additional Comments  RTB  -LB         Exercise 3    Exercise Name 3  piriformis stretch  -LB      Reps 3  3  -LB      Time 3  20  -LB         Exercise 4    Exercise Name 4  DKTC with PPT  -LB      Reps 4  15  -LB      Time 4  5  -LB      Additional Comments  green ball  -LB         Exercise 6    Exercise Name 6  Nu Step Lvl 3  -LB      Time 6  5 min  -LB         Exercise 7    Exercise Name 7  HS stretch on steps  -LB      Cueing 7  Verbal  -LB      Reps 7  3  -LB      Time 7  20s  -LB         Exercise 8    Exercise Name 8  standing HS curl  -LB      Sets 8  2  -LB      Reps 8  10  -LB      Additional Comments  2# B  -LB         Exercise 9    Exercise Name 9  bridge  -LB      Cueing 9  Verbal  -LB      Reps 9  12  -LB      Additional Comments  small range with iso adduction  -LB         Exercise 10    Exercise Name 10  clams  -LB      Cueing 10  Verbal  -LB      Sets 10  2  -LB      Reps 10  10  -LB      Additional Comments  B  -LB         Exercise 11    Exercise Name 11  tband row  -LB      Sets 11  2  -LB      Reps 11  10  -LB      Additional Comments  RTB; cuing for TA  -LB         Exercise 12    Exercise Name 12  tband extension + TA  -LB      Reps 12  20  -LB      Additional Comments  RTB  -LB         Exercise 13    Exercise Name 13  SL thoracic arc  -LB      Cueing 13  Verbal  -LB      Reps 13  10  -LB      Time 13  10s  -LB         Exercise 14    Exercise Name 14  supine HA  -LB      Cueing 14  Verbal  -LB      Sets 14  2  -LB      Reps 14  10  -LB      Additional Comments  RTB  -LB        User Key  (r) = Recorded By, (t) = Taken By, (c) = Cosigned By    Initials Name Provider Type    Nadege Bravo PT Physical Therapist                       PT OP  Goals     Row Name 06/19/19 1000          PT Short Term Goals    STG Date to Achieve  06/20/19  -LB     STG 1  Pt will demonstrate understanding and compliance with initial HEP.  -LB     STG 1 Progress  Ongoing  -LB     STG 1 Progress Comments  Intermittent compliance.  -LB     STG 2  Pt will report reduced AM stiffness by 50% using HEP and management strategies.  -LB     STG 2 Progress  Progressing  -LB     STG 2 Progress Comments  Pt reports 0/10 pain today  -LB     STG 3  Pt will report 0 occurence of RLE pain distal to R SI joint.  -LB     STG 3 Progress  Partially Met  -LB     STG 3 Progress Comments  Pt reports no distal pain over last 4 days.  -LB        Long Term Goals    LTG Date to Achieve  07/06/19  -LB     LTG 1  Pt will report reduced overall disability from 46% to 35% or less.  -LB     LTG 1 Progress  Ongoing  -LB     LTG 2  Pt will report tolerance to sleeping 6 hours or more without waking due to RLE pain.  -LB     LTG 2 Progress  Ongoing  -LB     LTG 3  Pt will demonstrate B knee flexion strength 5/5.  -LB     LTG 3 Progress  Ongoing  -LB       User Key  (r) = Recorded By, (t) = Taken By, (c) = Cosigned By    Initials Name Provider Type    Nadege Bravo, PT Physical Therapist          Therapy Education  Education Details: encouraged HEP performance  Given: HEP, Symptoms/condition management  Program: Reinforced  How Provided: Verbal, Demonstration  Provided to: Patient  Level of Understanding: Teach back education performed, Verbalized, Demonstrated              Time Calculation:   Start Time: 1000  Stop Time: 1045  Time Calculation (min): 45 min  Total Timed Code Minutes- PT: 44 minute(s)  Therapy Charges for Today     Code Description Service Date Service Provider Modifiers Qty    46399329203 HC PT THER PROC EA 15 MIN 6/19/2019 Nadege Up, PT GP 3                    Nadege Up PT  6/19/2019

## 2019-06-21 ENCOUNTER — HOSPITAL ENCOUNTER (OUTPATIENT)
Dept: PHYSICAL THERAPY | Facility: HOSPITAL | Age: 44
Setting detail: THERAPIES SERIES
Discharge: HOME | End: 2019-06-21

## 2019-06-21 DIAGNOSIS — M79.604 RIGHT LEG PAIN: Primary | ICD-10-CM

## 2019-06-21 DIAGNOSIS — M25.511 ACUTE PAIN OF RIGHT SHOULDER: ICD-10-CM

## 2019-06-21 PROCEDURE — 97110 THERAPEUTIC EXERCISES: CPT | Performed by: PHYSICAL THERAPIST

## 2019-06-21 PROCEDURE — 97140 MANUAL THERAPY 1/> REGIONS: CPT | Performed by: PHYSICAL THERAPIST

## 2019-06-21 NOTE — THERAPY TREATMENT NOTE
Outpatient Physical Therapy Ortho Treatment Note  [facility]     Patient Name: Radha Robles  : 1975  MRN: [id number]  Today's Date: 2019      Visit Date: 2019    Visit Dx:    ICD-10-CM ICD-9-CM   1. Right leg pain M79.604 729.5   2. Acute pain of right shoulder M25.511 719.41       Patient Active Problem List   Diagnosis   • Hair loss   • Encounter for screening for malignant neoplasm of colon   • Palpitations   • Dyspnea on exertion   • Essential hypertension   • Depression   • Vitamin D deficiency   • Hypothyroidism due to Hashimoto's thyroiditis   • Nontoxic multinodular goiter        Past Medical History:   Diagnosis Date   • Dyslipidemia    • High blood pressure    • Hypertension    • Hypothyroidism due to Hashimoto's thyroiditis 2019   • Menses painful         Past Surgical History:   Procedure Laterality Date   [facility]    • COLONOSCOPY N/A 10/30/2018    Procedure: COLONOSCOPY TO THE CECUM AND TI WITH COLD BX POLYPECTOMIES;  Surgeon: Kody Mendez MD;  Location: [facility] ENDOSCOPY;  Service: Gastroenterology   • WISDOM TOOTH EXTRACTION                         PT Assessment/Plan     Row Name 19 1246          PT Assessment    Assessment Comments  Ms. Robles returns today, with an order to continue therapy x 3 additional sessions. She is approximately 1 month out from injury. She reports overall improvement in her condition.  She is reporting R buttock tissue pain today.  We continued to strengthen globally, added lateral prayer stretch and worked on STM/trigger point release to R piriformis/glut tissue.  She reported decreased R buttock pain post manual, will continue to assess.  Ms. Robles is progressing toward all functional goals and remains a good candidate for skilled physical therapy.   -GJ        PT Plan    PT Plan Comments  assess response to manual work of R piriformis/glut tissue, repeat as appropriate, continue to  "globally strengthen postural/core/hip girdle tissues.    -GJ       User Key  (r) = Recorded By, (t) = Taken By, (c) = Cosigned By    Initials Name Provider Type     Francisco Mohr, PT Physical Therapist            Exercises     Row Name 06/21/19 1046 06/21/19 1000          Subjective Comments    Subjective Comments  --  I saw the doctor, Still having \"SI\" pain on the right,   -GJ        Subjective Pain    Pre-Treatment Pain Level  --  3  -GJ        Total Minutes    47465 - PT Therapeutic Exercise Minutes  35  -GJ  --     53074 - PT Manual Therapy Minutes  12  -GJ  --        Exercise 1    Exercise Name 1  --  D2 flexion supine   -GJ     Cueing 1  --  Verbal  -GJ     Reps 1  --  12  -GJ     Additional Comments  --  RTB,bilateral  -GJ        Exercise 3    Exercise Name 3  --  piriformis stretch  -GJ     Cueing 3  --  Verbal;Demo  -GJ     Reps 3  --  3  -GJ     Time 3  --  20  -GJ        Exercise 4    Exercise Name 4  --  DKTC with PPT  -GJ     Cueing 4  --  Verbal  -GJ     Reps 4  --  15  -GJ     Time 4  --  5  -GJ     Additional Comments  --  green ball  -GJ        Exercise 5    Exercise Name 5  --  lateral prayer stretch, bilaterally  -GJ     Cueing 5  --  Verbal;Demo  -GJ     Reps 5  --  3  -GJ     Time 5  --  20 s  -GJ        Exercise 6    Exercise Name 6  --  Nu Step Lvl 3  -GJ     Time 6  --  5 min  -GJ        Exercise 7    Exercise Name 7  --  HS stretch on steps  -GJ     Cueing 7  --  Verbal  -GJ     Reps 7  --  3  -GJ     Time 7  --  20s  -GJ        Exercise 8    Exercise Name 8  --  standing HS curl  -GJ     Cueing 8  --  Verbal  -GJ     Sets 8  --  2  -GJ     Reps 8  --  10  -GJ     Additional Comments  --  2# B  -GJ        Exercise 9    Exercise Name 9  --  bridge  -GJ     Cueing 9  --  Verbal  -GJ     Reps 9  --  12  -GJ     Time 9  --  3 s  -GJ     Additional Comments  --  near full range with iso adduction  -GJ        Exercise 10    Exercise Name 10  --  clams  -GJ     Cueing 10  --  Verbal  -GJ     " Sets 10  --  2  -GJ     Reps 10  --  10  -GJ     Additional Comments  --  B  -GJ        Exercise 11    Exercise Name 11  --  tband row  -GJ     Cueing 11  --  Verbal;Tactile  -GJ     Sets 11  --  2  -GJ     Reps 11  --  10  -GJ     Additional Comments  --  RTB; cuing for TA  -GJ        Exercise 12    Exercise Name 12  --  tband extension + TA  -GJ     Cueing 12  --  Verbal  -GJ     Reps 12  --  20  -GJ     Additional Comments  --  RTB  -GJ        Exercise 13    Exercise Name 13  --  SL thoracic arc  -GJ     Cueing 13  --  Verbal  -GJ     Reps 13  --  10  -GJ     Time 13  --  10s  -GJ        Exercise 14    Exercise Name 14  --  supine HA  -GJ     Cueing 14  --  Verbal  -GJ     Sets 14  --  2  -GJ     Reps 14  --  10  -GJ     Additional Comments  --  RTB  -GJ       User Key  (r) = Recorded By, (t) = Taken By, (c) = Cosigned By    Initials Name Provider Type    Francisco Porter, PT Physical Therapist                      Manual Rx (last 36 hours)      Manual Treatments     Row Name 06/21/19 1100 06/21/19 1046          Total Minutes    21301 - PT Manual Therapy Minutes  --  12  -GJ        Manual Rx 1    Manual Rx 1 Location  manual STM/trigger point release to R piriformis/glut med tissue,   -GJ  --     Manual Rx 1 Type  pt. in L SL with pillow between knees  -GJ  --       User Key  (r) = Recorded By, (t) = Taken By, (c) = Cosigned By    Initials Name Provider Type    Francisco Porter, PT Physical Therapist          PT OP Goals     Row Name 06/21/19 1000          PT Short Term Goals    STG Date to Achieve  06/20/19  -GJ     STG 1  Pt will demonstrate understanding and compliance with initial HEP.  -GJ     STG 1 Progress  Met  -GJ     STG 2  Pt will report reduced AM stiffness by 50% using HEP and management strategies.  -GJ     STG 2 Progress  Progressing  -GJ     STG 3  Pt will report 0 occurence of RLE pain distal to R SI joint.  -GJ     STG 3 Progress  Partially Met  -GJ     STG 3 Progress Comments  reporting R  buttock pain today  -GJ        Long Term Goals    LTG Date to Achieve  07/06/19  -GJ     LTG 1  Pt will report reduced overall disability from 46% to 35% or less.  -GJ     LTG 1 Progress  Ongoing  -GJ     LTG 2  Pt will report tolerance to sleeping 6 hours or more without waking due to RLE pain.  -GJ     LTG 2 Progress  Ongoing  -GJ     LTG 3  Pt will demonstrate B knee flexion strength 5/5.  -GJ     LTG 3 Progress  Ongoing  -GJ       User Key  (r) = Recorded By, (t) = Taken By, (c) = Cosigned By    Initials Name Provider Type    Francisco Porter, PT Physical Therapist          Therapy Education  Education Details: educated in use of tennis ball for self trigger trigger point release, reviewed physiology of healing and time frames  Given: HEP, Symptoms/condition management, Pain management, Mobility training, Posture/body mechanics  Program: New, Progressed, Reinforced  How Provided: Verbal, Demonstration, Written  Provided to: Patient  Level of Understanding: Teach back education performed, Verbalized, Demonstrated              Time Calculation:   Start Time: 1046  Stop Time: 1133  Time Calculation (min): 47 min  Therapy Charges for Today     Code Description Service Date Service Provider Modifiers Qty    34613558350  PT THER PROC EA 15 MIN 6/21/2019 Francisco Mohr, PT GP 2    [id number]  PT MANUAL THERAPY EA 15 MIN 6/21/2019 Francisco Mohr, PT GP 1                    Francisco Mohr PT  6/21/2019

## 2019-06-26 ENCOUNTER — HOSPITAL ENCOUNTER (OUTPATIENT)
Dept: PHYSICAL THERAPY | Facility: HOSPITAL | Age: 44
Setting detail: THERAPIES SERIES
Discharge: HOME | End: 2019-06-26

## 2019-06-26 DIAGNOSIS — M25.511 ACUTE PAIN OF RIGHT SHOULDER: ICD-10-CM

## 2019-06-26 DIAGNOSIS — M79.604 RIGHT LEG PAIN: Primary | ICD-10-CM

## 2019-06-26 PROCEDURE — 97110 THERAPEUTIC EXERCISES: CPT

## 2019-06-26 PROCEDURE — 97140 MANUAL THERAPY 1/> REGIONS: CPT

## 2019-06-26 NOTE — THERAPY TREATMENT NOTE
Outpatient Physical Therapy Ortho Treatment Note  [facility]     Patient Name: Radha Robles  : 1975  MRN: [id number]  Today's Date: 2019      Visit Date: 2019    Visit Dx:    ICD-10-CM ICD-9-CM   1. Right leg pain M79.604 729.5   2. Acute pain of right shoulder M25.511 719.41       Patient Active Problem List   Diagnosis   • Hair loss   • Encounter for screening for malignant neoplasm of colon   • Palpitations   • Dyspnea on exertion   • Essential hypertension   • Depression   • Vitamin D deficiency   • Hypothyroidism due to Hashimoto's thyroiditis   • Nontoxic multinodular goiter        Past Medical History:   Diagnosis Date   • Dyslipidemia    • High blood pressure    • Hypertension    • Hypothyroidism due to Hashimoto's thyroiditis 2019   • Menses painful         Past Surgical History:   Procedure Laterality Date   • BREAST AUGMENTATION      ,  ,[facility]    • COLONOSCOPY N/A 10/30/2018    Procedure: COLONOSCOPY TO THE CECUM AND TI WITH COLD BX POLYPECTOMIES;  Surgeon: Kody Mendez MD;  Location: [facility] ENDOSCOPY;  Service: Gastroenterology   • WISDOM TOOTH EXTRACTION                         PT Assessment/Plan     Row Name 19 1100          PT Assessment    Assessment Comments  Continued STM to R glute/pirformis today, pt reports tenderness during STM. Continued with global strengthening, including addition of rev clamshells.  -CA        PT Plan    PT Plan Comments  Assess response to previous session, cont STM as indicated  -CA       User Key  (r) = Recorded By, (t) = Taken By, (c) = Cosigned By    Initials Name Provider Type    Michelle Trujillo, PT Physical Therapist            Exercises     Row Name 19 1100 19 1000          Subjective Comments    Subjective Comments  --  I was a little sore after the manual therapy last time.   -CA        Subjective Pain    Able to rate subjective pain?  --  yes  -CA     Pre-Treatment Pain Level  --  6 neck   -CA     Subjective Pain Comment  --  4/10 back  -CA        Total Minutes    35855 - PT Therapeutic Exercise Minutes  --  35  -CA     32084 - PT Manual Therapy Minutes  10  -CA  --        Exercise 1    Exercise Name 1  --  D2 flexion standing against noodle  -CA     Cueing 1  --  Verbal  -CA     Reps 1  --  12  -CA     Time 1  --  RTB; B  -CA        Exercise 2    Exercise Name 2  --  standing B ER against noodle  -CA     Cueing 2  --  Verbal  -CA     Sets 2  --  2  -CA     Reps 2  --  10  -CA     Time 2  --  RTB  -CA        Exercise 3    Exercise Name 3  --  piriformis stretch  -CA     Cueing 3  --  Verbal;Demo  -CA     Reps 3  --  3  -CA     Time 3  --  20  -CA        Exercise 4    Exercise Name 4  --  DKTC with PPT  -CA     Cueing 4  --  Verbal  -CA     Reps 4  --  15  -CA     Time 4  --  5  -CA     Additional Comments  --  green ball  -CA        Exercise 5    Exercise Name 5  --  lateral prayer stretch, bilaterally  -CA     Cueing 5  --  Verbal;Demo  -CA     Reps 5  --  3  -CA     Time 5  --  20 s  -CA        Exercise 6    Exercise Name 6  --  Nu Step Lvl 3  -CA     Time 6  --  5 min  -CA        Exercise 7    Exercise Name 7  --  HS stretch on steps  -CA     Cueing 7  --  Verbal  -CA     Reps 7  --  3  -CA     Time 7  --  20s  -CA        Exercise 9    Exercise Name 9  --  bridge  -CA     Cueing 9  --  Verbal  -CA     Reps 9  --  12  -CA     Time 9  --  3 s  -CA     Additional Comments  --  green ball  -CA        Exercise 10    Exercise Name 10  --  clams and rev clams  -CA     Cueing 10  --  Verbal  -CA     Sets 10  --  2 ea  -CA     Reps 10  --  10  -CA     Additional Comments  --  B; RTB  -CA        Exercise 11    Exercise Name 11  --  SLS tband row  -CA     Cueing 11  --  Verbal;Tactile  -CA     Sets 11  --  2  -CA     Reps 11  --  10  -CA     Additional Comments  --  RTB; cuing for TA  -CA        Exercise 12    Exercise Name 12  --  tband extension + TA  -CA     Cueing 12  --  Verbal  -CA     Reps 12  --  2x10   -CA     Additional Comments  --  RTB  -CA        Exercise 13    Exercise Name 13  --  SL thoracic arc  -CA     Cueing 13  --  Verbal  -CA     Reps 13  --  10  -CA     Time 13  --  10s  -CA        Exercise 14    Exercise Name 14  --  standing HA with noodle  -CA     Cueing 14  --  Verbal  -CA     Sets 14  --  2  -CA     Reps 14  --  10  -CA     Time 14  --  RTB  -CA       User Key  (r) = Recorded By, (t) = Taken By, (c) = Cosigned By    Initials Name Provider Type    Michelle Trujillo PT Physical Therapist                      Manual Rx (last 36 hours)      Manual Treatments     Row Name 06/26/19 1100             Total Minutes    98922 - PT Manual Therapy Minutes  10  -CA         Manual Rx 1    Manual Rx 1 Location  manual STM/trigger point release to R piriformis/glut med tissue,   -CA      Manual Rx 1 Type  pt. in L SL with pillow between knees  -CA      Manual Rx 1 Duration  10  -CA        User Key  (r) = Recorded By, (t) = Taken By, (c) = Cosigned By    Initials Name Provider Type    Michelle Trujillo PT Physical Therapist                             Time Calculation:   Start Time: 1045  Stop Time: 1130  Time Calculation (min): 45 min  Total Timed Code Minutes- PT: 45 minute(s)  Therapy Charges for Today     Code Description Service Date Service Provider Modifiers Qty    35500426511  PT THER PROC EA 15 MIN 6/26/2019 Michelle Jalloh, PT GP 2    [id number]  PT MANUAL THERAPY EA 15 MIN 6/26/2019 Michelle Jalloh, PT GP 1                    Michelle Jalloh PT  6/26/2019

## 2019-06-28 ENCOUNTER — HOSPITAL ENCOUNTER (OUTPATIENT)
Dept: PHYSICAL THERAPY | Facility: HOSPITAL | Age: 44
Setting detail: THERAPIES SERIES
Discharge: HOME | End: 2019-06-28

## 2019-06-28 DIAGNOSIS — M25.511 ACUTE PAIN OF RIGHT SHOULDER: ICD-10-CM

## 2019-06-28 DIAGNOSIS — M79.604 RIGHT LEG PAIN: Primary | ICD-10-CM

## 2019-06-28 PROCEDURE — 97110 THERAPEUTIC EXERCISES: CPT

## 2019-06-28 NOTE — THERAPY TREATMENT NOTE
Outpatient Physical Therapy Ortho Treatment Note  [facility]     Patient Name: Radha Robles  : 1975  MRN: [id number]  Today's Date: 2019      Visit Date: 2019    Visit Dx:    ICD-10-CM ICD-9-CM   1. Right leg pain M79.604 729.5   2. Acute pain of right shoulder M25.511 719.41       Patient Active Problem List   Diagnosis   • Hair loss   • Encounter for screening for malignant neoplasm of colon   • Palpitations   • Dyspnea on exertion   • Essential hypertension   • Depression   • Vitamin D deficiency   • Hypothyroidism due to Hashimoto's thyroiditis   • Nontoxic multinodular goiter        Past Medical History:   Diagnosis Date   • Dyslipidemia    • High blood pressure    • Hypertension    • Hypothyroidism due to Hashimoto's thyroiditis 2019   • Menses painful         Past Surgical History:   Procedure Laterality Date   • BREAST AUGMENTATION      ,  ,[facility]    • COLONOSCOPY N/A 10/30/2018    Procedure: COLONOSCOPY TO THE CECUM AND TI WITH COLD BX POLYPECTOMIES;  Surgeon: Kody Mendez MD;  Location: [facility] ENDOSCOPY;  Service: Gastroenterology   • WISDOM TOOTH EXTRACTION                         PT Assessment/Plan     Row Name 19 1306          PT Assessment    Assessment Comments  Pt reporting low pain levels and good tolerance to home duties and ADLs. 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 Dec muscular endurance and fatigues quickly, especially stabilizing muscles. Pt reluctant to continue repetitions through fatigue. Continued general strengthening, stabilization program with several rest breaks to allow muscle recovery.   -LB        PT Plan    PT Plan Comments  Continue core/hip strengthening/stabilization, postural improvement.   -LB       User Key  (r) = Recorded By, (t) = Taken By, (c) = Cosigned By    Initials Name Provider Type    Nadege Bravo, PT Physical Therapist            Exercises     Row Name 19  0700             Subjective Comments    Subjective Comments  I am doing better. My neck has a little catch in it from sleeping wrong but I am feeling good otherwise.  -LB         Subjective Pain    Able to rate subjective pain?  yes  -LB      Pre-Treatment Pain Level  2  -LB         Total Minutes    91869 - PT Therapeutic Exercise Minutes  40  -LB         Exercise 1    Exercise Name 1  standing star  -LB      Cueing 1  Verbal  -LB      Sets 1  --  -LB      Reps 1  10  -LB      Time 1  RTB; B  -LB         Exercise 2    Exercise Name 2  standing B ER against noodle  -LB      Cueing 2  Verbal  -LB      Sets 2  2  -LB      Reps 2  10  -LB      Time 2  RTB  -LB         Exercise 3    Exercise Name 3  piriformis stretch  -LB      Cueing 3  Verbal;Demo  -LB      Reps 3  3  -LB      Time 3  20  -LB         Exercise 4    Exercise Name 4  DKTC with PPT  -LB      Cueing 4  Verbal  -LB      Reps 4  15  -LB      Time 4  5  -LB      Additional Comments  green ball  -LB         Exercise 5    Exercise Name 5  lateral prayer stretch, bilaterally  -LB      Cueing 5  Verbal;Demo  -LB      Reps 5  3  -LB      Time 5  20 s  -LB         Exercise 6    Exercise Name 6  Nu Step Lvl 3  -LB      Time 6  5 min  -LB         Exercise 7    Exercise Name 7  HS stretch on steps  -LB      Cueing 7  Verbal  -LB      Reps 7  3  -LB      Time 7  20s  -LB         Exercise 9    Exercise Name 9  bridge  -LB      Cueing 9  Verbal  -LB      Reps 9  12  -LB      Time 9  3 s  -LB      Additional Comments  green ball  -LB         Exercise 10    Exercise Name 10  clams and rev clams  -LB      Cueing 10  Verbal  -LB      Sets 10  2 ea  -LB      Reps 10  10  -LB      Additional Comments  B; RTB  -LB         Exercise 11    Exercise Name 11  shoulder row in partial squat  -LB      Cueing 11  Verbal;Tactile  -LB      Sets 11  2  -LB      Reps 11  10  -LB      Additional Comments  TS 20#  -LB         Exercise 12    Exercise Name 12  shoulder extension  -LB      Cueing  12  Verbal  -LB      Reps 12  2x10  -LB      Additional Comments  TS 20#  -LB         Exercise 13    Exercise Name 13  --  -LB      Cueing 13  --  -LB      Reps 13  --  -LB      Time 13  --  -LB         Exercise 14    Exercise Name 14  --  -LB      Cueing 14  --  -LB      Sets 14  --  -LB      Reps 14  --  -LB      Time 14  --  -LB         Exercise 15    Exercise Name 15  cat/camel  -LB      Reps 15  8  -LB         Exercise 16    Exercise Name 16  lateral walkout TS  -LB      Reps 16  4  -LB      Additional Comments  30# B  -LB         Exercise 17    Exercise Name 17  blue ball squat  -LB      Reps 17  10  -LB      Additional Comments  cuing for glute activation   -LB        User Key  (r) = Recorded By, (t) = Taken By, (c) = Cosigned By    Initials Name Provider Type    Nadege Bravo, PT Physical Therapist                       PT OP Goals     Row Name 06/28/19 1300          PT Short Term Goals    STG Date to Achieve  06/20/19  -LB     STG 1  Pt will demonstrate understanding and compliance with initial HEP.  -LB     STG 1 Progress  Met  -LB     STG 2  Pt will report reduced AM stiffness by 50% using HEP and management strategies.  -LB     STG 2 Progress  Met  -LB     STG 3  Pt will report 0 occurence of RLE pain distal to R SI joint.  -LB     STG 3 Progress  Partially Met  -LB        Long Term Goals    LTG Date to Achieve  07/06/19  -LB     LTG 1  Pt will report reduced overall disability from 46% to 35% or less.  -LB     LTG 1 Progress  Ongoing  -LB     LTG 2  Pt will report tolerance to sleeping 6 hours or more without waking due to RLE pain.  -LB     LTG 2 Progress  Ongoing  -LB     LTG 3  Pt will demonstrate B knee flexion strength 5/5.  -LB     LTG 3 Progress  Ongoing  -LB       User Key  (r) = Recorded By, (t) = Taken By, (c) = Cosigned By    Initials Name Provider Type    Nadege Bravo PT Physical Therapist          Therapy Education  Given: Symptoms/condition management, HEP  Program: Reinforced  How  Provided: Verbal, Demonstration  Provided to: Patient  Level of Understanding: Teach back education performed, Verbalized, Demonstrated              Time Calculation:   Start Time: 0700  Stop Time: 0745  Time Calculation (min): 45 min  Total Timed Code Minutes- PT: 43 minute(s)  Therapy Charges for Today     Code Description Service Date Service Provider Modifiers Qty    51485357402 HC PT THER PROC EA 15 MIN 6/28/2019 Nadege Up, PT GP 3                    Nadege Up, PT  6/28/2019

## 2019-07-03 ENCOUNTER — APPOINTMENT (OUTPATIENT)
Dept: PHYSICAL THERAPY | Facility: HOSPITAL | Age: 44
End: 2019-07-03

## 2019-07-05 ENCOUNTER — APPOINTMENT (OUTPATIENT)
Dept: PHYSICAL THERAPY | Facility: HOSPITAL | Age: 44
End: 2019-07-05

## 2019-07-12 ENCOUNTER — APPOINTMENT (OUTPATIENT)
Dept: PHYSICAL THERAPY | Facility: HOSPITAL | Age: 44
End: 2019-07-12

## 2019-08-02 ENCOUNTER — TELEPHONE (OUTPATIENT)
Dept: GASTROENTEROLOGY | Facility: CLINIC | Age: 44
End: 2019-08-02

## 2019-08-02 NOTE — TELEPHONE ENCOUNTER
Faxed request received from [facility] for zofran 4 mg - 1 tab po Q 8 hours prn N&V, #20, R0.     Last filled 7/27/18.  C/s 10/30/18.     VM to pt with request to contact office - checking if requested.

## 2019-08-06 DIAGNOSIS — R11.0 NAUSEA: ICD-10-CM

## 2019-08-07 RX ORDER — ONDANSETRON 4 MG/1
TABLET, ORALLY DISINTEGRATING ORAL
Qty: 20 TABLET | Refills: 1 | Status: SHIPPED | OUTPATIENT
Start: 2019-08-07 | End: 2019-12-13 | Stop reason: SDUPTHER

## 2019-08-09 ENCOUNTER — TELEPHONE (OUTPATIENT)
Dept: GASTROENTEROLOGY | Facility: CLINIC | Age: 44
End: 2019-08-09

## 2019-08-09 NOTE — TELEPHONE ENCOUNTER
Called pt and advised that [facility] has refilled her zofran.  Also advised she will be do for f/u in Oct. Pt verb understanding and will call back to make appt.

## 2019-08-09 NOTE — TELEPHONE ENCOUNTER
----- Message from Maurisio Parker sent at 8/9/2019  8:13 AM EDT -----  Regarding: call back   Contact: [phone]  Call back

## 2019-08-23 DIAGNOSIS — K21.9 GASTROESOPHAGEAL REFLUX DISEASE, ESOPHAGITIS PRESENCE NOT SPECIFIED: ICD-10-CM

## 2019-08-23 RX ORDER — PANTOPRAZOLE SODIUM 40 MG/1
40 TABLET, DELAYED RELEASE ORAL DAILY
Qty: 90 TABLET | Refills: 0 | Status: SHIPPED | OUTPATIENT
Start: 2019-08-23 | End: 2019-12-13 | Stop reason: SDUPTHER

## 2019-11-04 ENCOUNTER — DOCUMENTATION (OUTPATIENT)
Dept: PHYSICAL THERAPY | Facility: HOSPITAL | Age: 44
End: 2019-11-04

## 2019-11-04 NOTE — THERAPY DISCHARGE NOTE
Outpatient Physical Therapy Discharge Summary         Patient Name: Radha Robles  : 1975  MRN: [id number]    Today's Date: 2019    Visit Dx:  No diagnosis found.    PT OP Goals     Row Name 19 1500          PT Short Term Goals    STG Date to Achieve  19  -LB     STG 1  Pt will demonstrate understanding and compliance with initial HEP.  -LB     STG 1 Progress  Met  -LB     STG 2  Pt will report reduced AM stiffness by 50% using HEP and management strategies.  -LB     STG 2 Progress  Met  -LB     STG 3  Pt will report 0 occurence of RLE pain distal to R SI joint.  -LB     STG 3 Progress  Partially Met  -LB        Long Term Goals    LTG Date to Achieve  19  -LB     LTG 1  Pt will report reduced overall disability from 46% to 35% or less.  -LB     LTG 1 Progress  Not Met  -LB     LTG 1 Progress Comments  Not formally reassessed.  -LB     LTG 2  Pt will report tolerance to sleeping 6 hours or more without waking due to RLE pain.  -LB     LTG 2 Progress  Not Met  -LB     LTG 3  Pt will demonstrate B knee flexion strength 5/5.  -LB     LTG 3 Progress  Not Met  -LB       User Key  (r) = Recorded By, (t) = Taken By, (c) = Cosigned By    Initials Name Provider Type    Nadege Bravo, PT Physical Therapist          OP PT Discharge Summary  Date of Discharge: 19  Reason for Discharge: Patient/Caregiver request  Outcomes Achieved: Patient able to partially acheive established goals  Discharge Destination: Home with home program  Discharge Instructions/Additional Comments: Pt progressing well with skilled PT services at last PT visit. She did not return for further follow up.       Time Calculation:                    Nadege Up PT  2019

## 2019-11-22 DIAGNOSIS — K21.9 GASTROESOPHAGEAL REFLUX DISEASE, ESOPHAGITIS PRESENCE NOT SPECIFIED: ICD-10-CM

## 2019-11-22 RX ORDER — PANTOPRAZOLE SODIUM 40 MG/1
40 TABLET, DELAYED RELEASE ORAL DAILY
Qty: 90 TABLET | Refills: 0 | Status: CANCELLED | OUTPATIENT
Start: 2019-11-22

## 2019-12-13 ENCOUNTER — OFFICE VISIT (OUTPATIENT)
Dept: GASTROENTEROLOGY | Facility: CLINIC | Age: 44
End: 2019-12-13

## 2019-12-13 VITALS
HEIGHT: 68 IN | SYSTOLIC BLOOD PRESSURE: 120 MMHG | WEIGHT: 188 LBS | DIASTOLIC BLOOD PRESSURE: 74 MMHG | TEMPERATURE: 97.6 F | BODY MASS INDEX: 28.49 KG/M2

## 2019-12-13 DIAGNOSIS — R11.0 NAUSEA: ICD-10-CM

## 2019-12-13 DIAGNOSIS — K21.9 GASTROESOPHAGEAL REFLUX DISEASE, ESOPHAGITIS PRESENCE NOT SPECIFIED: ICD-10-CM

## 2019-12-13 PROCEDURE — 99213 OFFICE O/P EST LOW 20 MIN: CPT | Performed by: NURSE PRACTITIONER

## 2019-12-13 RX ORDER — ONDANSETRON 4 MG/1
4 TABLET, ORALLY DISINTEGRATING ORAL EVERY 8 HOURS PRN
Qty: 20 TABLET | Refills: 3 | Status: ON HOLD | OUTPATIENT
Start: 2019-12-13 | End: 2021-06-22

## 2019-12-13 RX ORDER — PANTOPRAZOLE SODIUM 40 MG/1
40 TABLET, DELAYED RELEASE ORAL DAILY
Qty: 90 TABLET | Refills: 3 | Status: SHIPPED | OUTPATIENT
Start: 2019-12-13 | End: 2021-01-07 | Stop reason: SDUPTHER

## 2019-12-13 RX ORDER — VALSARTAN 320 MG/1
320 TABLET ORAL DAILY
Refills: 1 | COMMUNITY
Start: 2019-10-25 | End: 2021-06-16

## 2019-12-13 NOTE — PROGRESS NOTES
Chief Complaint   Patient presents with   • Heartburn   • Med Refill       Radha Robles is a  44 y.o. female here for a follow up visit for GERD.    HPI  44-year-old female presents today for follow-up visit for GERD.  She is a patient of Dr. Mendez.  She was last seen in the office on 7/27/2018.  She is a history of GERD and admits she does well on Protonix 40 mg once daily.  She denies any breakthrough reflux at this time.  She denies any dysphagia, abdominal pain, nausea and vomiting, diarrhea, constipation, rectal bleeding or melena.  She will occasionally have some nausea and Zofran works really well for her as needed.  She was appetite is good and her weight is stable.  Her last colonoscopy was done on 10/2018.  She did have some hyperplastic polyps found.  She will be due again in 2023.  Past Medical History:   Diagnosis Date   • Dyslipidemia    • Dyspnea on exertion    • High blood pressure    • Hypertension    • Hypothyroidism due to Hashimoto's thyroiditis 5/21/2019   • Menses painful    • Palpitations        Past Surgical History:   Procedure Laterality Date   • [facility]    • COLONOSCOPY N/A 10/30/2018    Two 3 to 4 mm polyps in the sigmoid colon, [facility]. PATH: Hyperplastic polyp   • WISDOM TOOTH EXTRACTION         Scheduled Meds:    Continuous Infusions:  No current facility-administered medications for this visit.     PRN Meds:.    No Known Allergies    Social History     Socioeconomic History   • Marital status:      Spouse name: Not on file   • Number of children: Not on file   • Years of education: Not on file   • Highest education level: Not on file   Tobacco Use   • Smoking status: Never Smoker   • Smokeless tobacco: Never Used   Substance and Sexual Activity   • Alcohol use: Yes     Frequency: Patient refused     Comment: social   • Drug use: No   • Sexual activity: Yes     Partners: Male     Birth control/protection: None       Family History    Adopted: Yes       Review of Systems   Constitutional: Negative for appetite change, chills, diaphoresis, fatigue, fever and unexpected weight change.   HENT: Negative for nosebleeds, postnasal drip, sore throat, trouble swallowing and voice change.    Respiratory: Negative for cough, choking, chest tightness, shortness of breath and wheezing.    Cardiovascular: Negative for chest pain, palpitations and leg swelling.   Gastrointestinal: Negative for abdominal distention, abdominal pain, anal bleeding, blood in stool, constipation, diarrhea, nausea, rectal pain and vomiting.   Endocrine: Negative for polydipsia, polyphagia and polyuria.   Musculoskeletal: Negative for gait problem.   Skin: Negative for rash and wound.   Allergic/Immunologic: Negative for food allergies.   Neurological: Negative for dizziness, speech difficulty and light-headedness.   Psychiatric/Behavioral: Negative for confusion, self-injury, sleep disturbance and suicidal ideas.       Vitals:    12/13/19 0857   BP: 120/74   Temp: 97.6 °F (36.4 °C)       Physical Exam   Constitutional: She is oriented to person, place, and time. She appears well-developed and well-nourished. She does not appear ill. No distress.   HENT:   Head: Normocephalic.   Eyes: Pupils are equal, round, and reactive to light.   Cardiovascular: Normal rate, regular rhythm and normal heart sounds.   Pulmonary/Chest: Effort normal and breath sounds normal.   Abdominal: Soft. Bowel sounds are normal. She exhibits no distension and no mass. There is no hepatosplenomegaly. There is no tenderness. There is no rebound and no guarding. No hernia.   Musculoskeletal: Normal range of motion.   Neurological: She is alert and oriented to person, place, and time.   Skin: Skin is warm and dry.   Psychiatric: She has a normal mood and affect. Her speech is normal and behavior is normal. Judgment normal.       No images are attached to the encounter.    Assessment and plan     1.  Gastroesophageal reflux disease, esophagitis presence not specified  - pantoprazole (PROTONIX) 40 MG EC tablet; Take 1 tablet by mouth Daily.  Dispense: 90 tablet; Refill: 3    2. Nausea  - ondansetron ODT (ZOFRAN-ODT) 4 MG disintegrating tablet; Take 1 tablet by mouth Every 8 (Eight) Hours As Needed for Nausea or Vomiting.  Dispense: 20 tablet; Refill: 3    GERD is well controlled at this time on Protonix 40 mg once daily.  Continue GERD precautions.  We will go ahead and refill the Protonix for 1 year.  We will go ahead and also refill the Zofran as needed.  Patient to call the office with any issues.  Patient to follow-up with me in 1 year.

## 2020-02-14 ENCOUNTER — TRANSCRIBE ORDERS (OUTPATIENT)
Dept: ADMINISTRATIVE | Facility: HOSPITAL | Age: 45
End: 2020-02-14

## 2020-02-14 DIAGNOSIS — M54.50 LOW BACK PAIN, UNSPECIFIED BACK PAIN LATERALITY, UNSPECIFIED CHRONICITY, UNSPECIFIED WHETHER SCIATICA PRESENT: Primary | ICD-10-CM

## 2020-02-15 ENCOUNTER — APPOINTMENT (OUTPATIENT)
Dept: MRI IMAGING | Facility: HOSPITAL | Age: 45
End: 2020-02-15

## 2020-02-23 ENCOUNTER — HOSPITAL ENCOUNTER (OUTPATIENT)
Dept: MRI IMAGING | Facility: HOSPITAL | Age: 45
End: 2020-02-23

## 2020-06-11 ENCOUNTER — APPOINTMENT (OUTPATIENT)
Dept: MRI IMAGING | Facility: HOSPITAL | Age: 45
End: 2020-06-11

## 2020-06-20 ENCOUNTER — HOSPITAL ENCOUNTER (OUTPATIENT)
Dept: MRI IMAGING | Facility: HOSPITAL | Age: 45
Discharge: HOME | End: 2020-06-20
Admitting: ORTHOPAEDIC SURGERY

## 2020-06-20 DIAGNOSIS — M54.50 LOW BACK PAIN, UNSPECIFIED BACK PAIN LATERALITY, UNSPECIFIED CHRONICITY, UNSPECIFIED WHETHER SCIATICA PRESENT: ICD-10-CM

## 2020-06-20 PROCEDURE — 72148 MRI LUMBAR SPINE W/O DYE: CPT

## 2020-11-06 ENCOUNTER — HOSPITAL ENCOUNTER (EMERGENCY)
Facility: HOSPITAL | Age: 45
Discharge: HOME | End: 2020-11-06
Attending: EMERGENCY MEDICINE | Admitting: EMERGENCY MEDICINE

## 2020-11-06 VITALS
HEART RATE: 91 BPM | BODY MASS INDEX: 29.55 KG/M2 | WEIGHT: 195 LBS | DIASTOLIC BLOOD PRESSURE: 110 MMHG | RESPIRATION RATE: 18 BRPM | SYSTOLIC BLOOD PRESSURE: 154 MMHG | TEMPERATURE: 97 F | OXYGEN SATURATION: 100 % | HEIGHT: 68 IN

## 2020-11-06 DIAGNOSIS — G89.29 ACUTE EXACERBATION OF CHRONIC LOW BACK PAIN: Primary | ICD-10-CM

## 2020-11-06 DIAGNOSIS — M54.50 ACUTE EXACERBATION OF CHRONIC LOW BACK PAIN: Primary | ICD-10-CM

## 2020-11-06 PROCEDURE — 99282 EMERGENCY DEPT VISIT SF MDM: CPT

## 2020-11-06 RX ORDER — METAXALONE 800 MG/1
800 TABLET ORAL 3 TIMES DAILY PRN
Qty: 21 TABLET | Refills: 0 | Status: SHIPPED | OUTPATIENT
Start: 2020-11-06 | End: 2021-06-16

## 2020-11-06 RX ORDER — METHYLPREDNISOLONE 4 MG/1
TABLET ORAL
Qty: 1 EACH | Refills: 0 | Status: SHIPPED | OUTPATIENT
Start: 2020-11-06 | End: 2021-06-16

## 2020-11-06 NOTE — ED TRIAGE NOTES
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 She c/o 4/10 pain MIGUELITO low back, worse when standing or moving. Pt and this RN wore masks throughout triage

## 2020-11-06 NOTE — ED PROVIDER NOTES
EMERGENCY DEPARTMENT ENCOUNTER    Room Number:  08/08  Date of encounter:  11/6/2020  PCP: Jaylan Gleason MD  Historian: patient   Full history not obtainable due to: none     HPI:  Chief Complaint: Back pain     Context: Radha Robles is a 45 y.o. female who presents to the ED c/o back pain onset just prior to arrival.  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  The pain is right-sided, constant and radiates into her posterior thigh abetting mid way down.  Walking worsens the pain.  Denies any incontinence.  Denies any saddle anesthesia.  Denies any weakness of the lower extremities.  States she has a history of chronic back pain and has had similar presentation back in February and states she did respond well to steroids and muscle relaxers at that time.  Dr. Benitez his orthopedic doctor.      She did have an MRI in August of this year.    Medical record review: The patient had an MRI in June of this year.  Results are reviewed and as below.  IMPRESSION:  1. Multilevel degenerative disease involving the lumbar spine as  described in detail above including severe loss of disc height and  moderate endplate degenerative changes at L5-S1, multilevel broad-based  disc osteophyte complex and facet degenerative disease. At L2-L3 there  is a far lateral disc bulge or protrusion with a superimposed small disc  herniation extending cephalad into the lateral aspect of the neural  foramen on the right. This abuts the ventral surface of the right L2  nerve as it exits the neural foramen.  2. Transitional anatomy is appreciated with partial sacralization of L5.  Careful correlation prior to any intervention is required due to the  presence of transitional anatomy.  3. Mild concave deformity involving the superior endplate of T11 but  without edema.     This report was finalized on 6/22/2020 10:22 AM by Dr. Lupillo Fairchild M.D.    PAST MEDICAL  HISTORY    Active Ambulatory Problems     Diagnosis Date Noted   • Hair loss 08/14/2017   • Encounter for screening for malignant neoplasm of colon 07/27/2018   • Palpitations 04/27/2019   • Dyspnea on exertion 04/27/2019   • Essential hypertension 04/27/2019   • Depression 03/05/2013   • Vitamin D deficiency 05/21/2019   • Hypothyroidism due to Hashimoto's thyroiditis 05/21/2019   • Nontoxic multinodular goiter 05/21/2019     Resolved Ambulatory Problems     Diagnosis Date Noted   • No Resolved Ambulatory Problems     Past Medical History:   Diagnosis Date   • Dyslipidemia    • High blood pressure    • Hypertension    • Menses painful          PAST SURGICAL HISTORY  Past Surgical History:   Procedure Laterality Date   • BREAST AUGMENTATION      2005, [facility]    • COLONOSCOPY N/A 10/30/2018    Two 3 to 4 mm polyps in the sigmoid colon, [facility]. PATH: Hyperplastic polyp   • WISDOM TOOTH EXTRACTION           FAMILY HISTORY  Family History   Adopted: Yes         SOCIAL HISTORY  Social History     Socioeconomic History   • Marital status:      Spouse name: Not on file   • Number of children: Not on file   • Years of education: Not on file   • Highest education level: Not on file   Tobacco Use   • Smoking status: Never Smoker   • Smokeless tobacco: Never Used   Substance and Sexual Activity   • Alcohol use: Yes     Frequency: Patient refused     Comment: social   • Drug use: No   • Sexual activity: Yes     Partners: Male     Birth control/protection: None         ALLERGIES  Patient has no known allergies.        REVIEW OF SYSTEMS  Review of Systems   All systems reviewed and marked as negative except as listed in HPI       PHYSICAL EXAM    I have reviewed the triage vital signs and nursing notes.    ED Triage Vitals [11/06/20 1030]   Temp Heart Rate Resp BP SpO2   97 °F (36.1 °C) 91 18 -- 100 %      Temp src Heart Rate Source Patient Position BP Location FiO2 (%)   Tympanic Monitor -- -- --       GENERAL:  alert well developed, well nourished in no distress  HENT: NCAT, neck supple, trachea midline  EYES: no scleral icterus, PERRL, normal conjunctivae  CV: regular rhythm, regular rate, no murmur  RESPIRATORY: unlabored effort, CTAB  ABDOMEN: soft, nontender, nondistended, bowel sounds present  MUSCULOSKELETAL: no gross deformity. No tenderness of the midline vertebral spine. No step off. There is spasm noted at the right para spinous region of L4-L5. No foot drop. Lower extremity strength is intact.   NEURO: alert,  sensory and motor function of extremities grossly intact, speech clear, mental status normal/baseline  SKIN: warm, dry, no rash  PSYCH:  Appropriate mood and affect    Vital signs and nursing notes reviewed.        Procedures        MEDICATIONS GIVEN IN ER    Medications - No data to display      PROGRESS, DATA ANALYSIS, CONSULTS, AND MEDICAL DECISION MAKING    All labs have been independently reviewed by me.  All radiology studies have been reviewed by me.   EKG's independently reviewed by me.  Discussion below represents my analysis of pertinent findings related to patient's condition, differential diagnosis, treatment plan and final disposition.    DIFFERENTIAL DIAGNOSIS INCLUDE BUT NOT LIMITED TO: Lumbago, muscle spasm, vertebral fracture, spinal stenosis, lumbar radiculopathy, cauda equina syndrome, DDD, AAA, retroperitoneal hematoma, SBO, diverticulitis, UTI      ED Course as of Nov 06 1624 Fri Nov 06, 2020   1445 MDM: The patient presents with acute on chronic exacerbation of low back pain.  She has had previous radicular presentation in the past.  There is no saddle anesthesia or incontinence noted.  No fevers or history of IV drug abuse.  She is responded well to steroids and muscle x-rays in the past.  Given that her exam is fairly benign with the exception of palpable spasm in the right paraspinous region, plan to send the patient home with a prescription for oral steroids and muscle relaxers.   She will see Dr. Lock in follow-up.  There is no indication for imaging at this time she did not have any trauma and her exam and history are not suggestive of cauda equina or other acute pathology.    [JS]      ED Course User Index  [JS] Carina Florian APRN       AS OF 16:24 EST VITALS:        BP - (!) 154/110  HR - 91  TEMP - 97 °F (36.1 °C) (Tympanic)  02 SATS - 100%          DIAGNOSIS  Final diagnoses:   Acute exacerbation of chronic low back pain         DISPOSITION  Discharge     Pt masked in first look. I wore a surgical mask and protective eye wear throughout my encounters with the pt. I performed hand hygiene on entry into the pt room and upon exit.     Dictated utilizing Dragon dictation:  Much of this encounter note is an electronic transcription/translation of spoken language to printed text. The electronic translation of spoken language may permit erroneous, or at times, nonsensical words or phrases to be inadvertently transcribed; Although I have reviewed the note for such errors, some may still exist.       Carina Florian APRN  11/06/20 1625       Carina Florian APRN  11/06/20 1625

## 2020-11-09 ENCOUNTER — EPISODE CHANGES (OUTPATIENT)
Dept: CASE MANAGEMENT | Facility: OTHER | Age: 45
End: 2020-11-09

## 2020-11-09 NOTE — ED PROVIDER NOTES
MD ATTESTATION NOTE    The TE and I have discussed this patient's history, physical exam, and treatment plan.  I have reviewed the documentation and personally had a face to face interaction with the patient. I affirm the documentation and agree with the treatment and plan.  The attached note describes my personal findings.      History  45-year-old female presents with increasing lower back pain rating to the right lower extremity.  Patient has a history of lower back pain.  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    Physical Exam  Vital Signs reviewed  Alert, Well Appearing in NAD  Neuro-strength, sensation within normal limits    Disposition  I discussed care and treatment of this patient with NP Maura Florian.  We will treat with steroids and muscle relaxants, will follow up with Dr. Benitez who is seen her in the past for this problem.     Donovan Nelson MD  11/09/20 0701

## 2020-12-15 ENCOUNTER — TRANSCRIBE ORDERS (OUTPATIENT)
Dept: LAB | Facility: HOSPITAL | Age: 45
End: 2020-12-15

## 2020-12-15 ENCOUNTER — LAB (OUTPATIENT)
Dept: LAB | Facility: HOSPITAL | Age: 45
End: 2020-12-15

## 2020-12-15 DIAGNOSIS — I10 ESSENTIAL HYPERTENSION, MALIGNANT: ICD-10-CM

## 2020-12-15 DIAGNOSIS — I10 ESSENTIAL HYPERTENSION, MALIGNANT: Primary | ICD-10-CM

## 2020-12-15 LAB
ALBUMIN SERPL-MCNC: 4.3 G/DL (ref 3.5–5.2)
ALBUMIN/GLOB SERPL: 1.5 G/DL
ALP SERPL-CCNC: 76 U/L (ref 39–117)
ALT SERPL W P-5'-P-CCNC: 21 U/L (ref 1–33)
ANION GAP SERPL CALCULATED.3IONS-SCNC: 10.5 MMOL/L (ref 5–15)
AST SERPL-CCNC: 16 U/L (ref 1–32)
BASOPHILS # BLD AUTO: 0.03 10*3/MM3 (ref 0–0.2)
BASOPHILS NFR BLD AUTO: 0.4 % (ref 0–1.5)
BILIRUB SERPL-MCNC: 0.3 MG/DL (ref 0–1.2)
BUN SERPL-MCNC: 17 MG/DL (ref 6–20)
BUN/CREAT SERPL: 25.4 (ref 7–25)
CALCIUM SPEC-SCNC: 9.7 MG/DL (ref 8.6–10.5)
CHLORIDE SERPL-SCNC: 104 MMOL/L (ref 98–107)
CO2 SERPL-SCNC: 23.5 MMOL/L (ref 22–29)
CREAT SERPL-MCNC: 0.67 MG/DL (ref 0.57–1)
DEPRECATED RDW RBC AUTO: 39.4 FL (ref 37–54)
EOSINOPHIL # BLD AUTO: 0.15 10*3/MM3 (ref 0–0.4)
EOSINOPHIL NFR BLD AUTO: 2.2 % (ref 0.3–6.2)
ERYTHROCYTE [DISTWIDTH] IN BLOOD BY AUTOMATED COUNT: 12 % (ref 12.3–15.4)
GFR SERPL CREATININE-BSD FRML MDRD: 94 ML/MIN/1.73
GLOBULIN UR ELPH-MCNC: 2.9 GM/DL
GLUCOSE SERPL-MCNC: 96 MG/DL (ref 65–99)
HCT VFR BLD AUTO: 39.5 % (ref 34–46.6)
HGB BLD-MCNC: 13 G/DL (ref 12–15.9)
IMM GRANULOCYTES # BLD AUTO: 0.03 10*3/MM3 (ref 0–0.05)
IMM GRANULOCYTES NFR BLD AUTO: 0.4 % (ref 0–0.5)
LYMPHOCYTES # BLD AUTO: 1.95 10*3/MM3 (ref 0.7–3.1)
LYMPHOCYTES NFR BLD AUTO: 28 % (ref 19.6–45.3)
MCH RBC QN AUTO: 29.3 PG (ref 26.6–33)
MCHC RBC AUTO-ENTMCNC: 32.9 G/DL (ref 31.5–35.7)
MCV RBC AUTO: 89 FL (ref 79–97)
MONOCYTES # BLD AUTO: 0.33 10*3/MM3 (ref 0.1–0.9)
MONOCYTES NFR BLD AUTO: 4.7 % (ref 5–12)
NEUTROPHILS NFR BLD AUTO: 4.47 10*3/MM3 (ref 1.7–7)
NEUTROPHILS NFR BLD AUTO: 64.3 % (ref 42.7–76)
NRBC BLD AUTO-RTO: 0 /100 WBC (ref 0–0.2)
PLATELET # BLD AUTO: 423 10*3/MM3 (ref 140–450)
PMV BLD AUTO: 9.1 FL (ref 6–12)
POTASSIUM SERPL-SCNC: 4.2 MMOL/L (ref 3.5–5.2)
PROT SERPL-MCNC: 7.2 G/DL (ref 6–8.5)
RBC # BLD AUTO: 4.44 10*6/MM3 (ref 3.77–5.28)
SODIUM SERPL-SCNC: 138 MMOL/L (ref 136–145)
WBC # BLD AUTO: 6.96 10*3/MM3 (ref 3.4–10.8)

## 2020-12-15 PROCEDURE — 36415 COLL VENOUS BLD VENIPUNCTURE: CPT

## 2020-12-15 PROCEDURE — 85025 COMPLETE CBC W/AUTO DIFF WBC: CPT

## 2020-12-15 PROCEDURE — 80053 COMPREHEN METABOLIC PANEL: CPT

## 2020-12-30 ENCOUNTER — APPOINTMENT (OUTPATIENT)
Dept: WOMENS IMAGING | Facility: HOSPITAL | Age: 45
End: 2020-12-30

## 2020-12-30 PROCEDURE — 77063 BREAST TOMOSYNTHESIS BI: CPT | Performed by: RADIOLOGY

## 2020-12-30 PROCEDURE — 77067 SCR MAMMO BI INCL CAD: CPT | Performed by: RADIOLOGY

## 2021-01-04 ENCOUNTER — IMMUNIZATION (OUTPATIENT)
Dept: VACCINE CLINIC | Facility: HOSPITAL | Age: 46
End: 2021-01-04

## 2021-01-04 PROCEDURE — 91300 HC SARSCOV02 VAC 30MCG/0.3ML IM: CPT | Performed by: INTERNAL MEDICINE

## 2021-01-04 PROCEDURE — 0001A: CPT | Performed by: INTERNAL MEDICINE

## 2021-01-07 DIAGNOSIS — K21.9 GASTROESOPHAGEAL REFLUX DISEASE: ICD-10-CM

## 2021-01-08 RX ORDER — PANTOPRAZOLE SODIUM 40 MG/1
40 TABLET, DELAYED RELEASE ORAL DAILY
Qty: 90 TABLET | Refills: 3 | Status: SHIPPED | OUTPATIENT
Start: 2021-01-08 | End: 2022-03-30 | Stop reason: SDUPTHER

## 2021-01-25 ENCOUNTER — IMMUNIZATION (OUTPATIENT)
Dept: VACCINE CLINIC | Facility: HOSPITAL | Age: 46
End: 2021-01-25

## 2021-01-25 PROCEDURE — 0002A: CPT | Performed by: INTERNAL MEDICINE

## 2021-01-25 PROCEDURE — 91300 HC SARSCOV02 VAC 30MCG/0.3ML IM: CPT | Performed by: INTERNAL MEDICINE

## 2021-05-31 DIAGNOSIS — R11.0 NAUSEA: ICD-10-CM

## 2021-06-02 RX ORDER — ONDANSETRON 4 MG/1
4 TABLET, ORALLY DISINTEGRATING ORAL EVERY 8 HOURS PRN
Qty: 20 TABLET | Refills: 3 | OUTPATIENT
Start: 2021-06-02

## 2021-06-09 ENCOUNTER — TRANSCRIBE ORDERS (OUTPATIENT)
Dept: PREADMISSION TESTING | Facility: HOSPITAL | Age: 46
End: 2021-06-09

## 2021-06-16 ENCOUNTER — PRE-ADMISSION TESTING (OUTPATIENT)
Dept: PREADMISSION TESTING | Facility: HOSPITAL | Age: 46
End: 2021-06-16

## 2021-06-16 VITALS
TEMPERATURE: 97.9 F | HEART RATE: 77 BPM | BODY MASS INDEX: 29.73 KG/M2 | OXYGEN SATURATION: 100 % | HEIGHT: 68 IN | WEIGHT: 196.2 LBS | SYSTOLIC BLOOD PRESSURE: 124 MMHG | DIASTOLIC BLOOD PRESSURE: 78 MMHG | RESPIRATION RATE: 18 BRPM

## 2021-06-16 LAB
ANION GAP SERPL CALCULATED.3IONS-SCNC: 8.8 MMOL/L (ref 5–15)
BASOPHILS # BLD AUTO: 0.03 10*3/MM3 (ref 0–0.2)
BASOPHILS NFR BLD AUTO: 0.4 % (ref 0–1.5)
BUN SERPL-MCNC: 16 MG/DL (ref 6–20)
BUN/CREAT SERPL: 21.3 (ref 7–25)
CALCIUM SPEC-SCNC: 10 MG/DL (ref 8.6–10.5)
CHLORIDE SERPL-SCNC: 103 MMOL/L (ref 98–107)
CO2 SERPL-SCNC: 25.2 MMOL/L (ref 22–29)
CREAT SERPL-MCNC: 0.75 MG/DL (ref 0.57–1)
DEPRECATED RDW RBC AUTO: 38.8 FL (ref 37–54)
EOSINOPHIL # BLD AUTO: 0.09 10*3/MM3 (ref 0–0.4)
EOSINOPHIL NFR BLD AUTO: 1.3 % (ref 0.3–6.2)
ERYTHROCYTE [DISTWIDTH] IN BLOOD BY AUTOMATED COUNT: 12.5 % (ref 12.3–15.4)
GFR SERPL CREATININE-BSD FRML MDRD: 82 ML/MIN/1.73
GLUCOSE SERPL-MCNC: 83 MG/DL (ref 65–99)
HCG SERPL QL: NEGATIVE
HCT VFR BLD AUTO: 38.6 % (ref 34–46.6)
HGB BLD-MCNC: 13 G/DL (ref 12–15.9)
IMM GRANULOCYTES # BLD AUTO: 0.03 10*3/MM3 (ref 0–0.05)
IMM GRANULOCYTES NFR BLD AUTO: 0.4 % (ref 0–0.5)
LYMPHOCYTES # BLD AUTO: 2.36 10*3/MM3 (ref 0.7–3.1)
LYMPHOCYTES NFR BLD AUTO: 33.6 % (ref 19.6–45.3)
MCH RBC QN AUTO: 29 PG (ref 26.6–33)
MCHC RBC AUTO-ENTMCNC: 33.7 G/DL (ref 31.5–35.7)
MCV RBC AUTO: 86 FL (ref 79–97)
MONOCYTES # BLD AUTO: 0.49 10*3/MM3 (ref 0.1–0.9)
MONOCYTES NFR BLD AUTO: 7 % (ref 5–12)
NEUTROPHILS NFR BLD AUTO: 4.02 10*3/MM3 (ref 1.7–7)
NEUTROPHILS NFR BLD AUTO: 57.3 % (ref 42.7–76)
NRBC BLD AUTO-RTO: 0 /100 WBC (ref 0–0.2)
PLATELET # BLD AUTO: 365 10*3/MM3 (ref 140–450)
PMV BLD AUTO: 9 FL (ref 6–12)
POTASSIUM SERPL-SCNC: 4.1 MMOL/L (ref 3.5–5.2)
QT INTERVAL: 385 MS
RBC # BLD AUTO: 4.49 10*6/MM3 (ref 3.77–5.28)
SODIUM SERPL-SCNC: 137 MMOL/L (ref 136–145)
WBC # BLD AUTO: 7.02 10*3/MM3 (ref 3.4–10.8)

## 2021-06-16 PROCEDURE — 93010 ELECTROCARDIOGRAM REPORT: CPT | Performed by: INTERNAL MEDICINE

## 2021-06-16 PROCEDURE — 80048 BASIC METABOLIC PNL TOTAL CA: CPT

## 2021-06-16 PROCEDURE — 36415 COLL VENOUS BLD VENIPUNCTURE: CPT

## 2021-06-16 PROCEDURE — 85025 COMPLETE CBC W/AUTO DIFF WBC: CPT

## 2021-06-16 PROCEDURE — 84703 CHORIONIC GONADOTROPIN ASSAY: CPT

## 2021-06-16 PROCEDURE — 93005 ELECTROCARDIOGRAM TRACING: CPT

## 2021-06-16 RX ORDER — OMEGA-3 FATTY ACIDS/FISH OIL 300-1000MG
200 CAPSULE ORAL 3 TIMES DAILY
COMMUNITY
End: 2021-06-22 | Stop reason: HOSPADM

## 2021-06-16 NOTE — DISCHARGE INSTRUCTIONS
Arrive to hospital on your day of surgery at 530AM    Take the following medications the morning of surgery: PANTAPRAZOLE      If you are on prescription narcotic pain medication to control your pain you may also take that medication the morning of surgery.    General Instructions:  • Do not eat solid food after midnight the night before surgery.  • You may drink clear liquids day of surgery but must stop at least one hour before your hospital arrival time.  • It is beneficial for you to have a clear drink that contains carbohydrates the day of surgery.  We suggest a 12 to 20 ounce bottle of Gatorade or Powerade for non-diabetic patients or a 12 to 20 ounce bottle of G2 or Powerade Zero for diabetic patients. (Pediatric patients, are not advised to drink a 12 to 20 ounce carbohydrate drink)    Clear liquids are liquids you can see through.  Nothing red in color.     Plain water                               Sports drinks  Sodas                                   Gelatin (Jell-O)  Fruit juices without pulp such as white grape juice and apple juice  Popsicles that contain no fruit or yogurt  Tea or coffee (no cream or milk added)  Gatorade / Powerade  G2 / Powerade Zero    • Infants may have breast milk up to four hours before surgery.  • Infants drinking formula may drink formula up to six hours before surgery.   • Patients who avoid smoking, chewing tobacco and alcohol for 4 weeks prior to surgery have a reduced risk of post-operative complications.  Quit smoking as many days before surgery as you can.  • Do not smoke, use chewing tobacco or drink alcohol the day of surgery.   • If applicable bring your C-PAP/ BI-PAP machine.  • Bring any papers given to you in the doctor’s office.  • Wear clean comfortable clothes.  • Do not wear contact lenses, false eyelashes or make-up.  Bring a case for your glasses.   • Bring crutches or walker if applicable.  • Remove all piercings.  Leave jewelry and any other valuables at  home.  • Hair extensions with metal clips must be removed prior to surgery.  • The Pre-Admission Testing nurse will instruct you to bring medications if unable to obtain an accurate list in Pre-Admission Testing.        If you were given a blood bank ID arm band remember to bring it with you the day of surgery.    Preventing a Surgical Site Infection:  • For 2 to 3 days before surgery, avoid shaving with a razor because the razor can irritate skin and make it easier to develop an infection.    • Any areas of open skin can increase the risk of a post-operative wound infection by allowing bacteria to enter and travel throughout the body.  Notify your surgeon if you have any skin wounds / rashes even if it is not near the expected surgical site.  The area will need assessed to determine if surgery should be delayed until it is healed.  • The night prior to surgery shower using a fresh bar of anti-bacterial soap (such as Dial) and clean washcloth.  Sleep in a clean bed with clean clothing.  Do not allow pets to sleep with you.  • Shower on the morning of surgery using a fresh bar of anti-bacterial soap (such as Dial) and clean washcloth.  Dry with a clean towel and dress in clean clothing.  • Ask your surgeon if you will be receiving antibiotics prior to surgery.  • Make sure you, your family, and all healthcare providers clean their hands with soap and water or an alcohol based hand  before caring for you or your wound.    Day of surgery:  Your arrival time is approximately two hours before your scheduled surgery time.  Upon arrival, a Pre-op nurse and Anesthesiologist will review your health history, obtain vital signs, and answer questions you may have.  The only belongings needed at this time will be a list of your home medications and if applicable your C-PAP/BI-PAP machine.  A Pre-op nurse will start an IV and you may receive medication in preparation for surgery, including something to help you relax.      Please be aware that surgery does come with discomfort.  We want to make every effort to control your discomfort so please discuss any uncontrolled symptoms with your nurse.   Your doctor will most likely have prescribed pain medications.      If you are going home after surgery you will receive individualized written care instructions before being discharged.  A responsible adult must drive you to and from the hospital on the day of your surgery and stay with you for 24 hours.  Discharge prescriptions can be filled by the hospital pharmacy during regular pharmacy hours.  If you are having surgery late in the day/evening your prescription may be e-prescribed to your pharmacy.  Please verify your pharmacy hours or chose a 24 hour pharmacy to avoid not having access to your prescription because your pharmacy has closed for the day.    If you are staying overnight following surgery, you will be transported to your hospital room following the recovery period.  [facility] has all private rooms.    If you have any questions please call Pre-Admission Testing at [phone].  Deductibles and co-payments are collected on the day of service. Please be prepared to pay the required co-pay, deductible or deposit on the day of service as defined by your plan.    Patient Education for Self-Quarantine Process    Following your COVID testing, we strongly recommend that you do not leave your home after you have been tested for COVID except to get medical care. This includes not going to work, school or to public areas.  If this is not possible for you to do please limit your activities to only required outings.  Be sure to wear a mask when you are with other people, practice social distancing and wash your hands frequently.      The following items provide additional details to keep you safe.  • Wash your hands with soap and water frequently for at least 20 seconds.   • Avoid touching your eyes, nose and mouth  with unwashed hands.  • Do not share anything - utensils, towels, food from the same bowl.   • Have your own utensils, drinking glass, dishes, towels and bedding.   • Do not have visitors.   • Do use FaceTime to stay in touch with family and friends.  • You should stay in a specific room away from others if possible.   • Stay at least 6 feet away from others in the home if you cannot have a dedicated room to yourself.   • Do not snuggle with your pet. While the CDC says there is no evidence that pets can spread COVID-19 or be infected from humans, it is probably best to avoid “petting, snuggling, being kissed or licked and sharing food (during self-quarantine)”, according to the CDC.   • Sanitize household surfaces daily. Include all high touch areas (door handles, light switches, phones, countertops, etc.)  • Do not share a bathroom with others, if possible.   • Wear a mask around others in your home if you are unable to stay in a separate room or 6 feet apart. If  you are unable to wear a mask, have your family member wear a mask if they must be within 6 feet of you.   Call your surgeon immediately if you experience any of the following symptoms:  • Sore Throat  • Shortness of Breath or difficulty breathing  • Cough  • Chills  • Body soreness or muscle pain  • Headache  • Fever  • New loss of taste or smell  • Do not arrive for your surgery ill.  Your procedure will need to be rescheduled to another time.  You will need to call your physician before the day of surgery to avoid any unnecessary exposure to hospital staff as well as other patients.

## 2021-06-19 ENCOUNTER — LAB (OUTPATIENT)
Dept: LAB | Facility: HOSPITAL | Age: 46
End: 2021-06-19

## 2021-06-19 LAB — SARS-COV-2 ORF1AB RESP QL NAA+PROBE: NOT DETECTED

## 2021-06-19 PROCEDURE — C9803 HOPD COVID-19 SPEC COLLECT: HCPCS

## 2021-06-19 PROCEDURE — U0004 COV-19 TEST NON-CDC HGH THRU: HCPCS

## 2021-06-21 PROBLEM — N93.9 ABNORMAL UTERINE BLEEDING (AUB): Status: ACTIVE | Noted: 2021-06-21

## 2021-06-22 ENCOUNTER — HOSPITAL ENCOUNTER (OUTPATIENT)
Facility: HOSPITAL | Age: 46
Discharge: HOME | End: 2021-06-22
Attending: OBSTETRICS & GYNECOLOGY | Admitting: OBSTETRICS & GYNECOLOGY

## 2021-06-22 ENCOUNTER — ANESTHESIA EVENT (OUTPATIENT)
Dept: PERIOP | Facility: HOSPITAL | Age: 46
End: 2021-06-22

## 2021-06-22 ENCOUNTER — ANESTHESIA (OUTPATIENT)
Dept: PERIOP | Facility: HOSPITAL | Age: 46
End: 2021-06-22

## 2021-06-22 VITALS
HEART RATE: 73 BPM | RESPIRATION RATE: 16 BRPM | OXYGEN SATURATION: 97 % | SYSTOLIC BLOOD PRESSURE: 135 MMHG | HEIGHT: 68 IN | WEIGHT: 196 LBS | BODY MASS INDEX: 29.7 KG/M2 | TEMPERATURE: 98.4 F | DIASTOLIC BLOOD PRESSURE: 75 MMHG

## 2021-06-22 DIAGNOSIS — N93.9 ABNORMAL UTERINE BLEEDING (AUB): ICD-10-CM

## 2021-06-22 DIAGNOSIS — Z90.710 S/P LAPAROSCOPIC HYSTERECTOMY: Primary | ICD-10-CM

## 2021-06-22 DIAGNOSIS — R10.2 PELVIC PAIN: ICD-10-CM

## 2021-06-22 LAB
B-HCG UR QL: NEGATIVE
INTERNAL NEGATIVE CONTROL: NORMAL
INTERNAL POSITIVE CONTROL: NORMAL
Lab: NORMAL

## 2021-06-22 PROCEDURE — 25010000002 PHENYLEPHRINE PER 1 ML: Performed by: NURSE ANESTHETIST, CERTIFIED REGISTERED

## 2021-06-22 PROCEDURE — 25010000002 ONDANSETRON PER 1 MG: Performed by: NURSE ANESTHETIST, CERTIFIED REGISTERED

## 2021-06-22 PROCEDURE — C1889 IMPLANT/INSERT DEVICE, NOC: HCPCS | Performed by: OBSTETRICS & GYNECOLOGY

## 2021-06-22 PROCEDURE — 25010000002 DEXAMETHASONE PER 1 MG: Performed by: NURSE ANESTHETIST, CERTIFIED REGISTERED

## 2021-06-22 PROCEDURE — 81025 URINE PREGNANCY TEST: CPT | Performed by: ANESTHESIOLOGY

## 2021-06-22 PROCEDURE — P9612 CATHETERIZE FOR URINE SPEC: HCPCS

## 2021-06-22 PROCEDURE — 63710000001 PROMETHAZINE PER 25 MG: Performed by: NURSE ANESTHETIST, CERTIFIED REGISTERED

## 2021-06-22 PROCEDURE — 25010000002 PROPOFOL 10 MG/ML EMULSION: Performed by: NURSE ANESTHETIST, CERTIFIED REGISTERED

## 2021-06-22 PROCEDURE — 25010000002 FENTANYL CITRATE (PF) 50 MCG/ML SOLUTION: Performed by: NURSE ANESTHETIST, CERTIFIED REGISTERED

## 2021-06-22 PROCEDURE — 88305 TISSUE EXAM BY PATHOLOGIST: CPT | Performed by: OBSTETRICS & GYNECOLOGY

## 2021-06-22 PROCEDURE — 25010000002 KETOROLAC TROMETHAMINE PER 15 MG: Performed by: OBSTETRICS & GYNECOLOGY

## 2021-06-22 PROCEDURE — 25010000002 MAGNESIUM SULFATE PER 500 MG OF MAGNESIUM: Performed by: NURSE ANESTHETIST, CERTIFIED REGISTERED

## 2021-06-22 PROCEDURE — 25010000002 MIDAZOLAM PER 1 MG: Performed by: ANESTHESIOLOGY

## 2021-06-22 PROCEDURE — 25010000002 HYDROMORPHONE PER 4 MG: Performed by: NURSE ANESTHETIST, CERTIFIED REGISTERED

## 2021-06-22 PROCEDURE — 88307 TISSUE EXAM BY PATHOLOGIST: CPT | Performed by: OBSTETRICS & GYNECOLOGY

## 2021-06-22 PROCEDURE — 25010000003 CEFAZOLIN IN DEXTROSE 2-4 GM/100ML-% SOLUTION: Performed by: OBSTETRICS & GYNECOLOGY

## 2021-06-22 DEVICE — DEV CONTRL TISS STRATAFIX SPIRAL PLS PDS CT1 2/0 22CM: Type: IMPLANTABLE DEVICE | Site: ABDOMEN | Status: FUNCTIONAL

## 2021-06-22 DEVICE — HEMOST ABS SURGICEL SNOW 1X2IN: Type: IMPLANTABLE DEVICE | Site: ABDOMEN | Status: FUNCTIONAL

## 2021-06-22 RX ORDER — MAGNESIUM SULFATE HEPTAHYDRATE 500 MG/ML
INJECTION, SOLUTION INTRAMUSCULAR; INTRAVENOUS AS NEEDED
Status: DISCONTINUED | OUTPATIENT
Start: 2021-06-22 | End: 2021-06-22 | Stop reason: SURG

## 2021-06-22 RX ORDER — LABETALOL HYDROCHLORIDE 5 MG/ML
5 INJECTION, SOLUTION INTRAVENOUS
Status: DISCONTINUED | OUTPATIENT
Start: 2021-06-22 | End: 2021-06-22 | Stop reason: HOSPADM

## 2021-06-22 RX ORDER — IBUPROFEN 600 MG/1
600 TABLET ORAL ONCE AS NEEDED
Status: DISCONTINUED | OUTPATIENT
Start: 2021-06-22 | End: 2021-06-22 | Stop reason: HOSPADM

## 2021-06-22 RX ORDER — OXYCODONE HYDROCHLORIDE AND ACETAMINOPHEN 5; 325 MG/1; MG/1
2 TABLET ORAL EVERY 4 HOURS PRN
Status: DISCONTINUED | OUTPATIENT
Start: 2021-06-22 | End: 2021-06-22 | Stop reason: HOSPADM

## 2021-06-22 RX ORDER — NAPROXEN 500 MG/1
500 TABLET ORAL 2 TIMES DAILY WITH MEALS
Qty: 28 TABLET | Refills: 0 | Status: SHIPPED | OUTPATIENT
Start: 2021-06-22 | End: 2022-01-05

## 2021-06-22 RX ORDER — PROPOFOL 10 MG/ML
VIAL (ML) INTRAVENOUS AS NEEDED
Status: DISCONTINUED | OUTPATIENT
Start: 2021-06-22 | End: 2021-06-22 | Stop reason: SURG

## 2021-06-22 RX ORDER — ONDANSETRON 4 MG/1
4 TABLET, FILM COATED ORAL EVERY 8 HOURS PRN
Qty: 12 TABLET | Refills: 0 | Status: CANCELLED | OUTPATIENT
Start: 2021-06-22 | End: 2022-06-22

## 2021-06-22 RX ORDER — DEXAMETHASONE SODIUM PHOSPHATE 4 MG/ML
INJECTION, SOLUTION INTRA-ARTICULAR; INTRALESIONAL; INTRAMUSCULAR; INTRAVENOUS; SOFT TISSUE AS NEEDED
Status: DISCONTINUED | OUTPATIENT
Start: 2021-06-22 | End: 2021-06-22 | Stop reason: SURG

## 2021-06-22 RX ORDER — DEXMEDETOMIDINE HYDROCHLORIDE 100 UG/ML
INJECTION, SOLUTION INTRAVENOUS AS NEEDED
Status: DISCONTINUED | OUTPATIENT
Start: 2021-06-22 | End: 2021-06-22 | Stop reason: SURG

## 2021-06-22 RX ORDER — SCOLOPAMINE TRANSDERMAL SYSTEM 1 MG/1
1 PATCH, EXTENDED RELEASE TRANSDERMAL ONCE
Status: DISCONTINUED | OUTPATIENT
Start: 2021-06-22 | End: 2021-06-22 | Stop reason: HOSPADM

## 2021-06-22 RX ORDER — SODIUM CHLORIDE 0.9 % (FLUSH) 0.9 %
10 SYRINGE (ML) INJECTION EVERY 12 HOURS SCHEDULED
Status: DISCONTINUED | OUTPATIENT
Start: 2021-06-22 | End: 2021-06-22 | Stop reason: HOSPADM

## 2021-06-22 RX ORDER — MIDAZOLAM HYDROCHLORIDE 1 MG/ML
1 INJECTION INTRAMUSCULAR; INTRAVENOUS
Status: DISCONTINUED | OUTPATIENT
Start: 2021-06-22 | End: 2021-06-22 | Stop reason: HOSPADM

## 2021-06-22 RX ORDER — FENTANYL CITRATE 50 UG/ML
INJECTION, SOLUTION INTRAMUSCULAR; INTRAVENOUS AS NEEDED
Status: DISCONTINUED | OUTPATIENT
Start: 2021-06-22 | End: 2021-06-22 | Stop reason: SURG

## 2021-06-22 RX ORDER — ONDANSETRON 2 MG/ML
4 INJECTION INTRAMUSCULAR; INTRAVENOUS ONCE AS NEEDED
Status: COMPLETED | OUTPATIENT
Start: 2021-06-22 | End: 2021-06-22

## 2021-06-22 RX ORDER — DIPHENHYDRAMINE HYDROCHLORIDE 50 MG/ML
12.5 INJECTION INTRAMUSCULAR; INTRAVENOUS
Status: DISCONTINUED | OUTPATIENT
Start: 2021-06-22 | End: 2021-06-22 | Stop reason: HOSPADM

## 2021-06-22 RX ORDER — PROMETHAZINE HYDROCHLORIDE 25 MG/1
25 SUPPOSITORY RECTAL ONCE AS NEEDED
Status: COMPLETED | OUTPATIENT
Start: 2021-06-22 | End: 2021-06-22

## 2021-06-22 RX ORDER — FAMOTIDINE 10 MG/ML
20 INJECTION, SOLUTION INTRAVENOUS ONCE
Status: COMPLETED | OUTPATIENT
Start: 2021-06-22 | End: 2021-06-22

## 2021-06-22 RX ORDER — SODIUM CHLORIDE 9 MG/ML
INJECTION, SOLUTION INTRAVENOUS AS NEEDED
Status: DISCONTINUED | OUTPATIENT
Start: 2021-06-22 | End: 2021-06-22 | Stop reason: HOSPADM

## 2021-06-22 RX ORDER — ONDANSETRON 4 MG/1
4 TABLET, FILM COATED ORAL EVERY 8 HOURS PRN
Qty: 12 TABLET | Refills: 0 | Status: SHIPPED | OUTPATIENT
Start: 2021-06-22 | End: 2021-07-30 | Stop reason: SDUPTHER

## 2021-06-22 RX ORDER — IBUPROFEN 800 MG/1
800 TABLET ORAL EVERY 8 HOURS SCHEDULED
Status: DISCONTINUED | OUTPATIENT
Start: 2021-06-22 | End: 2021-06-22

## 2021-06-22 RX ORDER — BUPIVACAINE HYDROCHLORIDE AND EPINEPHRINE 5; 5 MG/ML; UG/ML
INJECTION, SOLUTION EPIDURAL; INTRACAUDAL; PERINEURAL AS NEEDED
Status: DISCONTINUED | OUTPATIENT
Start: 2021-06-22 | End: 2021-06-22 | Stop reason: HOSPADM

## 2021-06-22 RX ORDER — KETAMINE HYDROCHLORIDE 10 MG/ML
INJECTION INTRAMUSCULAR; INTRAVENOUS AS NEEDED
Status: DISCONTINUED | OUTPATIENT
Start: 2021-06-22 | End: 2021-06-22 | Stop reason: SURG

## 2021-06-22 RX ORDER — SODIUM CHLORIDE 0.9 % (FLUSH) 0.9 %
10 SYRINGE (ML) INJECTION AS NEEDED
Status: DISCONTINUED | OUTPATIENT
Start: 2021-06-22 | End: 2021-06-22 | Stop reason: HOSPADM

## 2021-06-22 RX ORDER — DIPHENHYDRAMINE HCL 25 MG
25 CAPSULE ORAL
Status: DISCONTINUED | OUTPATIENT
Start: 2021-06-22 | End: 2021-06-22 | Stop reason: HOSPADM

## 2021-06-22 RX ORDER — OXYCODONE AND ACETAMINOPHEN 10; 325 MG/1; MG/1
1 TABLET ORAL EVERY 4 HOURS PRN
Status: DISCONTINUED | OUTPATIENT
Start: 2021-06-22 | End: 2021-06-22 | Stop reason: HOSPADM

## 2021-06-22 RX ORDER — MAGNESIUM HYDROXIDE 1200 MG/15ML
LIQUID ORAL AS NEEDED
Status: DISCONTINUED | OUTPATIENT
Start: 2021-06-22 | End: 2021-06-22 | Stop reason: HOSPADM

## 2021-06-22 RX ORDER — EPHEDRINE SULFATE 50 MG/ML
5 INJECTION, SOLUTION INTRAVENOUS ONCE AS NEEDED
Status: DISCONTINUED | OUTPATIENT
Start: 2021-06-22 | End: 2021-06-22 | Stop reason: HOSPADM

## 2021-06-22 RX ORDER — PHENAZOPYRIDINE HYDROCHLORIDE 200 MG/1
200 TABLET, FILM COATED ORAL ONCE
Status: COMPLETED | OUTPATIENT
Start: 2021-06-22 | End: 2021-06-22

## 2021-06-22 RX ORDER — LIDOCAINE HYDROCHLORIDE 20 MG/ML
INJECTION, SOLUTION INFILTRATION; PERINEURAL AS NEEDED
Status: DISCONTINUED | OUTPATIENT
Start: 2021-06-22 | End: 2021-06-22 | Stop reason: SURG

## 2021-06-22 RX ORDER — FENTANYL CITRATE 50 UG/ML
50 INJECTION, SOLUTION INTRAMUSCULAR; INTRAVENOUS
Status: DISCONTINUED | OUTPATIENT
Start: 2021-06-22 | End: 2021-06-22 | Stop reason: HOSPADM

## 2021-06-22 RX ORDER — NALOXONE HCL 0.4 MG/ML
0.2 VIAL (ML) INJECTION AS NEEDED
Status: DISCONTINUED | OUTPATIENT
Start: 2021-06-22 | End: 2021-06-22 | Stop reason: HOSPADM

## 2021-06-22 RX ORDER — HYDROMORPHONE HYDROCHLORIDE 1 MG/ML
0.5 INJECTION, SOLUTION INTRAMUSCULAR; INTRAVENOUS; SUBCUTANEOUS
Status: DISCONTINUED | OUTPATIENT
Start: 2021-06-22 | End: 2021-06-22 | Stop reason: HOSPADM

## 2021-06-22 RX ORDER — KETOROLAC TROMETHAMINE 30 MG/ML
30 INJECTION, SOLUTION INTRAMUSCULAR; INTRAVENOUS ONCE
Status: COMPLETED | OUTPATIENT
Start: 2021-06-22 | End: 2021-06-22

## 2021-06-22 RX ORDER — OXYCODONE HYDROCHLORIDE AND ACETAMINOPHEN 5; 325 MG/1; MG/1
1 TABLET ORAL EVERY 4 HOURS PRN
Qty: 20 TABLET | Refills: 0 | Status: SHIPPED | OUTPATIENT
Start: 2021-06-22 | End: 2022-01-05

## 2021-06-22 RX ORDER — SODIUM CHLORIDE, SODIUM LACTATE, POTASSIUM CHLORIDE, CALCIUM CHLORIDE 600; 310; 30; 20 MG/100ML; MG/100ML; MG/100ML; MG/100ML
9 INJECTION, SOLUTION INTRAVENOUS CONTINUOUS
Status: DISCONTINUED | OUTPATIENT
Start: 2021-06-22 | End: 2021-06-22 | Stop reason: HOSPADM

## 2021-06-22 RX ORDER — FLUMAZENIL 0.1 MG/ML
0.2 INJECTION INTRAVENOUS AS NEEDED
Status: DISCONTINUED | OUTPATIENT
Start: 2021-06-22 | End: 2021-06-22 | Stop reason: HOSPADM

## 2021-06-22 RX ORDER — HYDROCODONE BITARTRATE AND ACETAMINOPHEN 7.5; 325 MG/1; MG/1
1 TABLET ORAL ONCE AS NEEDED
Status: DISCONTINUED | OUTPATIENT
Start: 2021-06-22 | End: 2021-06-22 | Stop reason: HOSPADM

## 2021-06-22 RX ORDER — BISACODYL 5 MG
10 TABLET, DELAYED RELEASE (ENTERIC COATED) ORAL DAILY
Qty: 60 TABLET | Refills: 2 | Status: SHIPPED | OUTPATIENT
Start: 2021-06-22 | End: 2022-01-05

## 2021-06-22 RX ORDER — ROCURONIUM BROMIDE 10 MG/ML
INJECTION, SOLUTION INTRAVENOUS AS NEEDED
Status: DISCONTINUED | OUTPATIENT
Start: 2021-06-22 | End: 2021-06-22 | Stop reason: SURG

## 2021-06-22 RX ORDER — SODIUM CHLORIDE 0.9 % (FLUSH) 0.9 %
3 SYRINGE (ML) INJECTION EVERY 12 HOURS SCHEDULED
Status: DISCONTINUED | OUTPATIENT
Start: 2021-06-22 | End: 2021-06-22 | Stop reason: HOSPADM

## 2021-06-22 RX ORDER — ONDANSETRON 2 MG/ML
INJECTION INTRAMUSCULAR; INTRAVENOUS AS NEEDED
Status: DISCONTINUED | OUTPATIENT
Start: 2021-06-22 | End: 2021-06-22 | Stop reason: SURG

## 2021-06-22 RX ORDER — CEFAZOLIN SODIUM 2 G/100ML
2 INJECTION, SOLUTION INTRAVENOUS ONCE
Status: COMPLETED | OUTPATIENT
Start: 2021-06-22 | End: 2021-06-22

## 2021-06-22 RX ORDER — PROMETHAZINE HYDROCHLORIDE 25 MG/1
25 TABLET ORAL ONCE AS NEEDED
Status: COMPLETED | OUTPATIENT
Start: 2021-06-22 | End: 2021-06-22

## 2021-06-22 RX ORDER — ONDANSETRON 2 MG/ML
4 INJECTION INTRAMUSCULAR; INTRAVENOUS EVERY 6 HOURS PRN
Status: DISCONTINUED | OUTPATIENT
Start: 2021-06-22 | End: 2021-06-22 | Stop reason: HOSPADM

## 2021-06-22 RX ORDER — HYDROMORPHONE HCL 110MG/55ML
PATIENT CONTROLLED ANALGESIA SYRINGE INTRAVENOUS AS NEEDED
Status: DISCONTINUED | OUTPATIENT
Start: 2021-06-22 | End: 2021-06-22 | Stop reason: SURG

## 2021-06-22 RX ORDER — ONDANSETRON 2 MG/ML
4 INJECTION INTRAMUSCULAR; INTRAVENOUS ONCE AS NEEDED
Status: DISCONTINUED | OUTPATIENT
Start: 2021-06-22 | End: 2021-06-22 | Stop reason: HOSPADM

## 2021-06-22 RX ADMIN — OXYCODONE HYDROCHLORIDE AND ACETAMINOPHEN 2 TABLET: 5; 325 TABLET ORAL at 10:47

## 2021-06-22 RX ADMIN — CEFAZOLIN SODIUM 2 G: 2 INJECTION, SOLUTION INTRAVENOUS at 07:19

## 2021-06-22 RX ADMIN — FENTANYL CITRATE 50 MCG: 50 INJECTION, SOLUTION INTRAMUSCULAR; INTRAVENOUS at 10:40

## 2021-06-22 RX ADMIN — MIDAZOLAM 1 MG: 1 INJECTION INTRAMUSCULAR; INTRAVENOUS at 07:16

## 2021-06-22 RX ADMIN — DEXMEDETOMIDINE 8 MCG: 100 INJECTION, SOLUTION, CONCENTRATE INTRAVENOUS at 09:13

## 2021-06-22 RX ADMIN — PHENYLEPHRINE HYDROCHLORIDE 200 MCG: 10 INJECTION INTRAVENOUS at 09:16

## 2021-06-22 RX ADMIN — HYDROMORPHONE HYDROCHLORIDE 0.5 MG: 2 INJECTION, SOLUTION INTRAMUSCULAR; INTRAVENOUS; SUBCUTANEOUS at 09:19

## 2021-06-22 RX ADMIN — ROCURONIUM BROMIDE 10 MG: 50 INJECTION INTRAVENOUS at 08:25

## 2021-06-22 RX ADMIN — ONDANSETRON 4 MG: 2 INJECTION INTRAMUSCULAR; INTRAVENOUS at 09:14

## 2021-06-22 RX ADMIN — DEXAMETHASONE SODIUM PHOSPHATE 8 MG: 4 INJECTION, SOLUTION INTRAMUSCULAR; INTRAVENOUS at 07:45

## 2021-06-22 RX ADMIN — HYDROMORPHONE HYDROCHLORIDE 0.5 MG: 2 INJECTION, SOLUTION INTRAMUSCULAR; INTRAVENOUS; SUBCUTANEOUS at 09:24

## 2021-06-22 RX ADMIN — PHENAZOPYRIDINE 200 MG: 200 TABLET ORAL at 07:08

## 2021-06-22 RX ADMIN — MAGNESIUM SULFATE HEPTAHYDRATE 2 G: 500 INJECTION, SOLUTION INTRAMUSCULAR; INTRAVENOUS at 07:40

## 2021-06-22 RX ADMIN — SODIUM CHLORIDE, POTASSIUM CHLORIDE, SODIUM LACTATE AND CALCIUM CHLORIDE 9 ML/HR: 600; 310; 30; 20 INJECTION, SOLUTION INTRAVENOUS at 07:05

## 2021-06-22 RX ADMIN — KETAMINE HYDROCHLORIDE 5 MG: 10 INJECTION INTRAMUSCULAR; INTRAVENOUS at 08:43

## 2021-06-22 RX ADMIN — PROMETHAZINE HYDROCHLORIDE 25 MG: 25 TABLET ORAL at 12:41

## 2021-06-22 RX ADMIN — KETOROLAC TROMETHAMINE 30 MG: 30 INJECTION, SOLUTION INTRAMUSCULAR at 13:32

## 2021-06-22 RX ADMIN — SCOLOPAMINE TRANSDERMAL SYSTEM 1 PATCH: 1 PATCH, EXTENDED RELEASE TRANSDERMAL at 07:23

## 2021-06-22 RX ADMIN — FENTANYL CITRATE 100 MCG: 50 INJECTION INTRAMUSCULAR; INTRAVENOUS at 07:29

## 2021-06-22 RX ADMIN — ONDANSETRON 4 MG: 2 INJECTION INTRAMUSCULAR; INTRAVENOUS at 10:58

## 2021-06-22 RX ADMIN — PHENYLEPHRINE HYDROCHLORIDE 100 MCG: 10 INJECTION INTRAVENOUS at 08:16

## 2021-06-22 RX ADMIN — SUGAMMADEX 200 MG: 100 INJECTION, SOLUTION INTRAVENOUS at 09:08

## 2021-06-22 RX ADMIN — ROCURONIUM BROMIDE 50 MG: 50 INJECTION INTRAVENOUS at 07:33

## 2021-06-22 RX ADMIN — FAMOTIDINE 20 MG: 10 INJECTION INTRAVENOUS at 07:16

## 2021-06-22 RX ADMIN — DEXMEDETOMIDINE 6 MCG: 100 INJECTION, SOLUTION, CONCENTRATE INTRAVENOUS at 09:21

## 2021-06-22 RX ADMIN — DEXMEDETOMIDINE 6 MCG: 100 INJECTION, SOLUTION, CONCENTRATE INTRAVENOUS at 09:17

## 2021-06-22 RX ADMIN — PROPOFOL 25 MCG/KG/MIN: 10 INJECTION, EMULSION INTRAVENOUS at 07:53

## 2021-06-22 RX ADMIN — PHENYLEPHRINE HYDROCHLORIDE 200 MCG: 10 INJECTION INTRAVENOUS at 09:10

## 2021-06-22 RX ADMIN — PROPOFOL 150 MG: 10 INJECTION, EMULSION INTRAVENOUS at 07:32

## 2021-06-22 RX ADMIN — SODIUM CHLORIDE, POTASSIUM CHLORIDE, SODIUM LACTATE AND CALCIUM CHLORIDE: 600; 310; 30; 20 INJECTION, SOLUTION INTRAVENOUS at 08:22

## 2021-06-22 RX ADMIN — LIDOCAINE HYDROCHLORIDE 100 MG: 20 INJECTION, SOLUTION INFILTRATION; PERINEURAL at 07:32

## 2021-06-22 RX ADMIN — KETAMINE HYDROCHLORIDE 45 MG: 10 INJECTION INTRAMUSCULAR; INTRAVENOUS at 07:39

## 2021-06-22 RX ADMIN — PHENYLEPHRINE HYDROCHLORIDE 100 MCG: 10 INJECTION INTRAVENOUS at 08:19

## 2021-06-22 NOTE — OP NOTE
GYN OPERATIVE NOTE     Date of surgery:6/22/2021   Patient ID: Radha Robles    YOB: 1975   MRN: [id number]      Pre-op Dx:    1. Abnormal uterine bleeding  2. Dyspareunia  3. Pelvic pain     Post-op Dx:  Same + left uterosacral implant  2 x 2 cm suspicious for endometriosis     Procedure:   1) Total laparoscopic hysterectomy  2) Bilateral salpingectomy  3) Endometriosis resection  4) Cystoscopy     Surgeon: Dr. Baer  Asst: Dr. Selina RAMIREZ Fellow  Anes: GETA  Antibiotics: Ancef 2g IV   EBL: 20 cc  Findings: small, mobile and retroverted 8 week size normal bilateral adnexa. Normal appendix.    On cystoscopy bladder intact and ureters patent bilaterally.    Specimen: Uterus, Cervix, bilateral fallopian tubes, left uterosacral ligament implant  Complications: none  Status: Stable to PACU     PROCEDURE IN DETAIL     Patient was taken to the operating room where anesthesia was induced without difficulty.  Patient was then placed in dorsal lithotomy positioning using cassi stirrups. Prepped and draped in the normal fashion. The gardner was placed at the beginning of the case without any difficulty. A sponge stick manipulator was placed without difficulty for uterine manipulation.      Attention was then turned to the abomen. An incision was made at the base of the umbilicus and the Veress needle was used for entry. Correct positioning was verified by the pressure noted on the system. The abdomen was insufflated to a pressure of 15mmHg. A 5mm trocar was placed through the umbilicus. Next, under direct visualization a 5mm port was placed in the right lower quadrant and left lower quadrant under direct visualization. A survey of the pelvis revealed findings as above. Both ureters were observed prior to starting the case.       Attention was turned to the left side where the tube was  from the ovary along the mesosalpinx using the Enseal device. The utero-ovarian ligament was then  desiccated and transected using the harmonic scalpel as well.  A similar technique was used to desiccate and transect the round ligament which was carried down to the anterior leaf of the broad ligament and subsequently was then  intimate with the scarring from the bladder to the anterior portion of the uterus. These adhesions were carefully taken down using the harmonic scalpel and the bladder flap was developed. The posterior leaf of the broad ligament was taken down to the level of the uterine vessels. The uterine vessels were skeletonized and coagulated and transected using the harmonic scalpel. These vessels were carefully lateralized and hemostasis was noted.        The procedure was repeated on the right side exactly as it was performed on the left side. Attention was then turned to the bladder flap. Dissection of the bladder was performed until visualization of the pubocervical fascia was clearly noted. At this time we placed the sponge stick anterior to the cervix to delineate the level of the vagina and the anterior colpotomy was performed. The sponge stick was then placed in the posterior cul-de-sac and the posterior culdotomy was made. The colpotomy was then completed by connecting both the anterior and posterior incisions with Harmonic scalpel.      The specimen was then removed from the pelvis and sent for permanent pathology.    The vaginal cuff was closed with 2-0 Stratafix in running fashion in 2 layers. Additional reinforcing sutures were placed at the cuff with 2-0 vicryl x2.   Attention was turned to the cul de sac and left uterosacral implant was noted approximately 2 x 2 cm. Ureter was identified again vermiculating. Harmonic scalpel was used to resect it staying away from the rectum and rectal integrity performed via a rectal exam. Sno was placed for prophylaxis.   At this point we confirmed that we maintained hemostasis from above. All ports were closed with 4-0 and 5-0 vicryl. All  laparoscopic instruments were removed from the field and counts were correct x 2.    Cystoscopy was then performed.    All sponge and needle counts were correct x 2     Dr. Baer was scrubbed and present throughout the entire procedure     Selina Howe MD-[facility] Fellow

## 2021-06-22 NOTE — ANESTHESIA POSTPROCEDURE EVALUATION
"Patient: Radha Robles    Procedure Summary     Date: 06/22/21 Room / Location: [facility] OR [address] [place] [address] MAIN OR    Anesthesia Start: 0726 Anesthesia Stop: 0934    Procedure: TOTAL LAPAROSCOPIC HYSTERECTOMY BILATERAL SALPINGECTOMY (N/A Abdomen) Diagnosis:     Surgeons: Minerva Baer MD Provider: Roc Rodriguez MD    Anesthesia Type: general ASA Status: 2          Anesthesia Type: general    Vitals  Vitals Value Taken Time   /79 06/22/21 1000   Temp 36.9 °C (98.4 °F) 06/22/21 0928   Pulse 78 06/22/21 1018   Resp 16 06/22/21 1000   SpO2 98 % 06/22/21 1018           Post Anesthesia Care and Evaluation    Patient location during evaluation: bedside  Patient participation: complete - patient participated  Level of consciousness: awake and alert  Pain management: adequate  Airway patency: patent  Anesthetic complications: No anesthetic complications    Cardiovascular status: acceptable  Respiratory status: acceptable  Hydration status: acceptable    Comments: /74   Pulse 67   Temp 36.9 °C (98.4 °F) (Oral)   Resp 20   Ht 172.7 cm (68\")   Wt 88.9 kg (196 lb)   LMP 05/26/2021 (Exact Date)   SpO2 94%   BMI 29.80 kg/m²           "

## 2021-06-22 NOTE — DISCHARGE INSTRUCTIONS
IF FEVER > 100.4, SEVERE PAIN, VAGINAL BLEEDING, NAUSEA/VOMITING, SHORTNESS OF BREATH, CHEST PAIN, SEVERE LEG SWELLING, PURULENT DISCHARGE OR INCREASING REDNESS OF INCISIONS, OR ANY OTHER CONCERNS CALL [phone] OR GO TO THE ER.     ABDOMINAL STERI-STRIPS CAN BE REMOVED AFTER 1 WEEK.     REMOVE PATCH BEHIND YOUR EAR IN 72 HOURS (ON Friday 6/25/21).    NO HEAVY LIFTING GREATER THAN 15 LBS.   NO DRIVING UNTIL NO PAIN AND NOT TAKING NARCOTICS OR CLEARED BY MD.   NO BATHTUBS  OK TO USE SHOWER DAY AFTER SURGERY.  AMBULATE OFTEN AS TOLERATED AND OK TO TAKE STAIRS          HOW DO I REST MY PELVIS?    For as long as told by your health care provider:  · Do not have sex, sexual stimulation, or an orgasm.  · Do not use tampons. Do not douche. Do not put anything in your vagina.  · Avoid activities that take a lot of effort (are strenuous).  · Avoid any activity in which your pelvic muscles could become strained.          .Scopolamine Patch    This patch has been applied to the skin behind one of your ears.  It may stay in place up to 24 hours. You may remove it at any time after your surgery; however, it should be removed after you are up and walking around the next day.  This medicine reduces stomach upset. Side effects may include: dry mouth, dizziness, sleepiness, constipation, or upset stomach.  An allergy would show up as: a rash, itching, wheezing or shortness of breath.  Follow these instructions:  1. Do not drink alcohol, drive or operate machinery while taking this medicine.  2. Wear only 1 patch at a time. You can leave the patch on for up to 24 hours.  3. When you remove the patch, fold it in half with the sticky sides together and throw it away. Wash your hands and the area under the patch.  4. Do not touch your eye with your hand if it has touched the patch.  5. Wash your hands well before and after touching the patch.  6. Sit or stand slowly to avoid dizziness.  Call your doctor if you have:  1. Any sign  of allergy  2. No relief  3. Trouble passing urine  Any new or severe symptoms

## 2021-06-22 NOTE — ANESTHESIA PREPROCEDURE EVALUATION
Anesthesia Evaluation     history of anesthetic complications: PONV               Airway   Mallampati: II  TM distance: >3 FB  Neck ROM: full  no difficulty expected and No difficulty expected  Dental - normal exam     Pulmonary - normal exam    breath sounds clear to auscultation  (-) rhonchi, decreased breath sounds, wheezes, rales, stridor  Cardiovascular - normal exam    ECG reviewed  Rhythm: regular  Rate: normal    (+) hypertension,   (-) murmur, weak pulses, friction rub, systolic click, carotid bruits, JVD, peripheral edema      Neuro/Psych  (+) psychiatric history ADD,     GI/Hepatic/Renal/Endo    (+)  GERD,      Musculoskeletal     Abdominal  - normal exam    Abdomen: soft.   Substance History      OB/GYN          Other                        Anesthesia Plan    ASA 2     general     intravenous induction     Anesthetic plan, all risks, benefits, and alternatives have been provided, discussed and informed consent has been obtained with: patient.

## 2021-06-22 NOTE — ANESTHESIA PROCEDURE NOTES
Airway  Urgency: elective    Date/Time: 6/22/2021 7:35 AM  Airway not difficult    General Information and Staff    Patient location during procedure: OR  Anesthesiologist: Roc Rodriguez MD  CRNA: Prisca Kaminski CRNA    Indications and Patient Condition  Indications for airway management: airway protection    Preoxygenated: yes  Mask difficulty assessment: 1 - vent by mask    Final Airway Details  Final airway type: endotracheal airway      Successful airway: ETT  Cuffed: yes   Successful intubation technique: direct laryngoscopy  Facilitating devices/methods: cricoid pressure  Endotracheal tube insertion site: oral  Blade: Millan  Blade size: 2  ETT size (mm): 7.0  Cormack-Lehane Classification: grade I - full view of glottis  Placement verified by: chest auscultation   Inital cuff pressure (cm H2O): 21  Cuff volume (mL): 5  Measured from: lips  Number of attempts at approach: 1  Assessment: lips, teeth, and gum same as pre-op    Additional Comments  EBBS: ETCO2+: Atraumatic intubation

## 2021-06-22 NOTE — H&P
[facility], [place], operated by [facility]   HISTORY AND PHYSICAL    Patient Name: Radha Robles  : 1975  MRN: [id number]  Primary Care Physician:  Jaylan Gleason MD  Date of admission: 2021    Subjective   Subjective     Chief Complaint: here for scheduled surgery      History of Present Illness  46 y.o G0 with hx of AUB and pelvic pain here for scheduled TLH/BS.   Denies any complaints.   NPO. No blood thinners.     Review of Systems   No significant abnormalities.     Personal History     Past Medical History:   Diagnosis Date   • Attention deficit disorder    • Dyslipidemia    • GERD (gastroesophageal reflux disease)    • Heavy periods    • High blood pressure    • Hypertension    • Menses painful    • PONV (postoperative nausea and vomiting)        Past Surgical History:   Procedure Laterality Date   • BREAST AUGMENTATION      , Dr.SalzmanHindu    • COLONOSCOPY N/A 10/30/2018    Two 3 to 4 mm polyps in the sigmoid colon, IH. PATH: Hyperplastic polyp   • WISDOM TOOTH EXTRACTION         Family History: family history is not on file. She was adopted. Otherwise pertinent FHx was reviewed and not pertinent to current issue.    Social History:  reports that she has never smoked. She has never used smokeless tobacco. She reports current alcohol use. She reports that she does not use drugs.    Home Medications:  Ibuprofen, Vitamin D, amphetamine-dextroamphetamine, finasteride, ondansetron ODT, pantoprazole, and valsartan    Allergies:  No Known Allergies    Objective    Objective     Vitals:   Temp:  [98.2 °F (36.8 °C)] 98.2 °F (36.8 °C)  Heart Rate:  [89] 89  Resp:  [18] 18  BP: (123)/(77) 123/77    Physical Exam   Gen: NAD  Aox3  Unlabored breathing  RRR  Abdomen: soft/nt/nd  Ext: no c/c/e    Result Review    Result Review:  I have personally reviewed the results from the time of this admission to 2021 07:02 EDT and agree with these findings:  [x]  Laboratory      Assessment/Plan   Assessment / Plan     Brief  Patient Summary:  Radha Robles is a 46 y.o. female who is here her for scheduled surgery    Active Hospital Problems:  Active Hospital Problems    Diagnosis    • Abnormal uterine bleeding (AUB)      Plan:   To OR for TLH/BS  Ancef 2g IV    DVT prophylaxis:  Mechanical DVT prophylaxis orders are present.        Electronically signed by Selina Howe MD, 06/22/21, 7:02 AM EDT.

## 2021-07-30 RX ORDER — ONDANSETRON 4 MG/1
4 TABLET, FILM COATED ORAL EVERY 8 HOURS PRN
Qty: 12 TABLET | Refills: 0 | Status: CANCELLED | OUTPATIENT
Start: 2021-07-30 | End: 2022-07-30

## 2021-08-10 RX ORDER — ONDANSETRON 4 MG/1
4 TABLET, FILM COATED ORAL EVERY 8 HOURS PRN
Qty: 12 TABLET | Refills: 0 | Status: CANCELLED | OUTPATIENT
Start: 2021-07-30 | End: 2022-07-30

## 2021-08-11 RX ORDER — ONDANSETRON 4 MG/1
4 TABLET, FILM COATED ORAL EVERY 8 HOURS PRN
Qty: 12 TABLET | Refills: 0 | Status: SHIPPED | OUTPATIENT
Start: 2021-08-11 | End: 2022-01-05

## 2021-12-17 ENCOUNTER — IMMUNIZATION (OUTPATIENT)
Dept: VACCINE CLINIC | Facility: HOSPITAL | Age: 46
End: 2021-12-17

## 2021-12-17 PROCEDURE — 0004A HC ADM SARSCOV2 30MCG/0.3ML BOOSTER: CPT | Performed by: INTERNAL MEDICINE

## 2021-12-17 PROCEDURE — 91300 HC SARSCOV02 VAC 30MCG/0.3ML IM: CPT | Performed by: INTERNAL MEDICINE

## 2022-01-05 ENCOUNTER — TELEMEDICINE (OUTPATIENT)
Dept: FAMILY MEDICINE CLINIC | Facility: TELEHEALTH | Age: 47
End: 2022-01-05

## 2022-01-05 DIAGNOSIS — R51.9 ACUTE NONINTRACTABLE HEADACHE, UNSPECIFIED HEADACHE TYPE: Primary | ICD-10-CM

## 2022-01-05 DIAGNOSIS — Z20.822 EXPOSURE TO COVID-19 VIRUS: ICD-10-CM

## 2022-01-05 DIAGNOSIS — R51.9 ACUTE NONINTRACTABLE HEADACHE, UNSPECIFIED HEADACHE TYPE: ICD-10-CM

## 2022-01-05 PROCEDURE — BHEMPVIDEOVISIT: Performed by: NURSE PRACTITIONER

## 2022-01-05 NOTE — PROGRESS NOTES
Subjective   Chief Complaint   Patient presents with   • Headache   • Exposure To Known Illness       Radha Robles is a 46 y.o. female.     Pt is a  employee. She was exposed to COVID 5 days ago. She began having a HA last night. She has been fully Vax with no hx of COVID.     URI   This is a new problem. The current episode started yesterday. The problem has been unchanged. There has been no fever. Associated symptoms include headaches. Pertinent negatives include no abdominal pain, chest pain, congestion, coughing, diarrhea, dysuria, ear pain, joint pain, joint swelling, nausea, neck pain, plugged ear sensation, rash, rhinorrhea, sinus pain, sneezing, sore throat, swollen glands, vomiting or wheezing. She has tried nothing for the symptoms.        No Known Allergies    Past Medical History:   Diagnosis Date   • Attention deficit disorder    • Dyslipidemia    • GERD (gastroesophageal reflux disease)    • Heavy periods    • High blood pressure    • Hypertension    • Menses painful    • PONV (postoperative nausea and vomiting)        Past Surgical History:   Procedure Laterality Date   • [facility]    • COLONOSCOPY N/A 10/30/2018    Two 3 to 4 mm polyps in the sigmoid colon, [facility]. PATH: Hyperplastic polyp   • TOTAL LAPAROSCOPIC HYSTERECTOMY N/A 6/22/2021    Procedure: TOTAL LAPAROSCOPIC HYSTERECTOMY BILATERAL SALPINGECTOMY;  Surgeon: Minerva Baer MD;  Location: [facility] MAIN OR;  Service: Gynecology;  Laterality: N/A;   • WISDOM TOOTH EXTRACTION         Social History     Socioeconomic History   • Marital status:    Tobacco Use   • Smoking status: Never Smoker   • Smokeless tobacco: Never Used   Vaping Use   • Vaping Use: Never used   Substance and Sexual Activity   • Alcohol use: Yes     Comment: social   • Drug use: No   • Sexual activity: Yes     Partners: Male     Birth control/protection: None       Family History   Adopted: Yes   Problem Relation Age of Onset    • Malig Hyperthermia Neg Hx          Current Outpatient Medications:   •  amphetamine-dextroamphetamine (ADDERALL) 15 MG tablet, Take 1 tablet by mouth 2 (Two) Times a Day., Disp: 60 tablet, Rfl: 0  •  finasteride (PROSCAR) 5 MG tablet, Take 0.25 tablets by mouth Daily., Disp: 24 tablet, Rfl: 1  •  pantoprazole (PROTONIX) 40 MG EC tablet, Take 1 tablet by mouth Daily., Disp: 90 tablet, Rfl: 3  •  valsartan (DIOVAN) 160 MG tablet, Take 1 tablet by mouth Daily., Disp: 90 tablet, Rfl: 0      Review of Systems   Constitutional: Negative for chills, diaphoresis, fatigue and fever.   HENT: Negative for congestion, ear pain, rhinorrhea, sneezing, sore throat and swollen glands.    Respiratory: Negative for cough and wheezing.    Cardiovascular: Negative for chest pain.   Gastrointestinal: Negative for abdominal pain, diarrhea, nausea and vomiting.   Genitourinary: Negative for dysuria.   Musculoskeletal: Negative for joint pain and neck pain.   Skin: Negative for rash.   Neurological: Positive for headache.        There were no vitals filed for this visit.    Objective   Physical Exam  Constitutional:       General: She is not in acute distress.     Appearance: Normal appearance. She is not ill-appearing, toxic-appearing or diaphoretic.   HENT:      Head: Normocephalic.      Mouth/Throat:      Lips: Pink.      Mouth: Mucous membranes are moist.   Pulmonary:      Effort: Pulmonary effort is normal.   Neurological:      Mental Status: She is alert and oriented to person, place, and time.   Psychiatric:         Mood and Affect: Mood normal.         Behavior: Behavior normal.          Procedures     Assessment/Plan   Diagnoses and all orders for this visit:    1. Acute nonintractable headache, unspecified headache type (Primary)  -     COVID-19,[facility] ROUTINE, NP/OP SWAB IN TRANSPORT MEDIA OR ESWAB 72 HR TAT - Swab, Nasopharynx; Future    2. Exposure to COVID-19 virus  -     COVID-19,[facility] ROUTINE, NP/OP SWAB IN TRANSPORT  "MEDIA OR ESWAB 72 HR TAT - Swab, Nasopharynx; Future    Due to your symptoms I have ordered a COVID-19 test for you to rule this out. Please go to your nearest [facility] for COVID-19 testing. When you arrive, let them know you have an order for testing. We will call you with the results from a BLOCKED NUMBER, usually within 1-3 days.     For [facility] Employee's undergoing COVID-19 testing: Have your COVID test done within 24-48 hours of failing the screening tool. Contact [facility] at [phone] or [phone]. Leave a VM with your full name, employee ID, , exact details of symptoms or exposure. You will receive a call back within 24-72 hours with further info.     While you are waiting for your results, you should Self-Quarantine.     If your test is Negative: Complete the \"Return to Work Survey\" from [facility] (found on [employer]) to return to work.    If your test is Positive: Call [facility] immediately to inform of the positive result. Self-Quarantine until you are deemed no longer contagious (10 days from symptom onset). Complete the \"Return to Work Survey\" found on [employer] to report your Positive test and return to the \"Return to Work Survey\" on [employer] within 24 hours of your anticipated return date in order to seek official clearance to return to work.    Treat symptoms.   Alternate tylenol and motrin for pain and/or fever, stay hydrated and rest.   Warning signs for COVID-19: trouble breathing, increasing shortness of breath, persistent chest pain or pressure, new confusion, inability to stay awake, pale, gray or blue-colored skin, lips or nail beds. If you show any of these signs seek Emergency Care.   If symptoms worsen or do not improve follow up with your PCP or visit your nearest Urgent Care Center or ER.        Results for orders placed or performed in visit on 21   TSPOT    Specimen: Blood   Result Value Ref Range    TSPOTTB Negative Negative    T-SPOT Panel A 0     " T-SPOT Panel B 0     TSPOT NIL CONTROL INTERP Passed     TSPOT POS CONTROL INTERP Passed        PLAN: Discussed dosing, side effects, recommended other symptomatic care.  Patient should follow up with primary care provider if symptoms worsen, fail to resolve or other symptoms need attention. Patient/family agree to the above.         HINA Owens     This visit was performed via Telehealth.  This patient has been instructed to follow-up with their primary care provider if their symptoms worsen or the treatment provided does not resolve their illness.

## 2022-01-05 NOTE — PATIENT INSTRUCTIONS
"Due to your symptoms I have ordered a COVID-19 test for you to rule this out. Please go to your nearest [facility] for COVID-19 testing. When you arrive, let them know you have an order for testing. We will call you with the results from a BLOCKED NUMBER, usually within 1-3 days.     For [facility] Employee's undergoing COVID-19 testing: Have your COVID test done within 24-48 hours of failing the screening tool. Contact [facility] [employer] at [phone] or [phone]. Leave a VM with your full name, employee ID, , exact details of symptoms or exposure. You will receive a call back within 24-72 hours with further info.     While you are waiting for your results, you should Self-Quarantine.     If your test is Negative: Complete the \"Return to Work Survey\" from [facility] (found on [employer]) to return to work.    If your test is Positive: Call [facility] immediately to inform of the positive result. Self-Quarantine until you are deemed no longer contagious (10 days from symptom onset). Complete the \"Return to Work Survey\" found on [employer] to report your Positive test and return to the \"Return to Work Survey\" on [employer] within 24 hours of your anticipated return date in order to seek official clearance to return to work.    Treat symptoms.   Alternate tylenol and motrin for pain and/or fever, stay hydrated and rest.   Warning signs for COVID-19: trouble breathing, increasing shortness of breath, persistent chest pain or pressure, new confusion, inability to stay awake, pale, gray or blue-colored skin, lips or nail beds. If you show any of these signs seek Emergency Care.   If symptoms worsen or do not improve follow up with your PCP or visit your nearest Urgent Care Center or ER.        Viral Respiratory Infection  A respiratory infection is an illness that affects part of the respiratory system, such as the lungs, nose, or throat. A respiratory infection that is caused by a virus is called a viral " respiratory infection.  Common types of viral respiratory infections include:  · A cold.  · The flu (influenza).  · A respiratory syncytial virus (RSV) infection.  What are the causes?  This condition is caused by a virus.  What are the signs or symptoms?  Symptoms of this condition include:  · A stuffy or runny nose.  · Yellow or green nasal discharge.  · A cough.  · Sneezing.  · Fatigue.  · Achy muscles.  · A sore throat.  · Sweating or chills.  · A fever.  · A headache.  How is this diagnosed?  This condition may be diagnosed based on:  · Your symptoms.  · A physical exam.  · Testing of nasal swabs.  How is this treated?  This condition may be treated with medicines, such as:  · Antiviral medicine. This may shorten the length of time a person has symptoms.  · Expectorants. These make it easier to cough up mucus.  · Decongestant nasal sprays.  · Acetaminophen or NSAIDs to relieve fever and pain.  Antibiotic medicines are not prescribed for viral infections. This is because antibiotics are designed to kill bacteria. They are not effective against viruses.  Follow these instructions at home:    Managing pain and congestion  · Take over-the-counter and prescription medicines only as told by your health care provider.  · If you have a sore throat, gargle with a salt-water mixture 3-4 times a day or as needed. To make a salt-water mixture, completely dissolve ½-1 tsp of salt in 1 cup of warm water.  · Use nose drops made from salt water to ease congestion and soften raw skin around your nose.  · Drink enough fluid to keep your urine pale yellow. This helps prevent dehydration and helps loosen up mucus.  General instructions  · Rest as much as possible.  · Do not drink alcohol.  · Do not use any products that contain nicotine or tobacco, such as cigarettes and e-cigarettes. If you need help quitting, ask your health care provider.  · Keep all follow-up visits as told by your health care provider. This is important.  How  is this prevented?    · Get an annual flu shot. You may get the flu shot in late summer, fall, or winter. Ask your health care provider when you should get your flu shot.  · Avoid exposing others to your respiratory infection.  ? Stay home from work or school as told by your health care provider.  ? Wash your hands with soap and water often, especially after you cough or sneeze. If soap and water are not available, use alcohol-based hand .  · Avoid contact with people who are sick during cold and flu season. This is generally fall and winter.  Contact a health care provider if:  · Your symptoms last for 10 days or longer.  · Your symptoms get worse over time.  · You have a fever.  · You have severe sinus pain in your face or forehead.  · The glands in your jaw or neck become very swollen.  Get help right away if you:  · Feel pain or pressure in your chest.  · Have shortness of breath.  · Faint or feel like you will faint.  · Have severe and persistent vomiting.  · Feel confused or disoriented.  Summary  · A respiratory infection is an illness that affects part of the respiratory system, such as the lungs, nose, or throat. A respiratory infection that is caused by a virus is called a viral respiratory infection.  · Common types of viral respiratory infections are a cold, influenza, and respiratory syncytial virus (RSV) infection.  · Symptoms of this condition include a stuffy or runny nose, cough, sneezing, fatigue, achy muscles, sore throat, and fevers or chills.  · Antibiotic medicines are not prescribed for viral infections. This is because antibiotics are designed to kill bacteria. They are not effective against viruses.  This information is not intended to replace advice given to you by your health care provider. Make sure you discuss any questions you have with your health care provider.  Document Revised: 12/26/2019 Document Reviewed: 01/28/2019  Elsevier Patient Education © 2021 [employer]    10  Things You Can Do to Manage Your COVID-19 Symptoms at Home  If you have possible or confirmed COVID-19:  1. Stay home except to get medical care.  2. Monitor your symptoms carefully. If your symptoms get worse, call your healthcare provider immediately.  3. Get rest and stay hydrated.  4. If you have a medical appointment, call the healthcare provider ahead of time and tell them that you have or may have COVID-19.  5. For medical emergencies, call 911 and notify the dispatch personnel that you have or may have COVID-19.  6. Cover your cough and sneezes with a tissue or use the inside of your elbow.  7. Wash your hands often with soap and water for at least 20 seconds or clean your hands with an alcohol-based hand  that contains at least 60% alcohol.  8. As much as possible, stay in a specific room and away from other people in your home. Also, you should use a separate bathroom, if available. If you need to be around other people in or outside of the home, wear a mask.  9. Avoid sharing personal items with other people in your household, like dishes, towels, and bedding.  10. Clean all surfaces that are touched often, like counters, tabletops, and doorknobs. Use household cleaning sprays or wipes according to the label instructions.  cdc.gov/coronavirus  07/16/2021  This information is not intended to replace advice given to you by your health care provider. Make sure you discuss any questions you have with your health care provider.  Document Revised: 08/04/2021 Document Reviewed: 08/04/2021  [employer] Patient Education © 2021 [employer]    How to Quarantine at Home  Information for Patients and Families    These instructions are for people with confirmed or suspected COVID-19 who do not need to be hospitalized and those with confirmed COVID-19 who were hospitalized and discharged to care for themselves at home.    If you were tested through the Health Department  The Health Department will monitor your  wellbeing.  If it is determined that you do not need to be hospitalized and can be isolated at home, you will be monitored by staff from your local or state health department.     If you were tested through a Commercial Lab  You will need to monitor yourself and report changes in your symptoms to your doctor.  See the section below called Monitor Your Symptoms.    Follow these steps until a healthcare provider or local or state health department says you can return to your normal activities.    Stay home except to get medical care  • Restrict activities outside your home, except for getting medical care.   • Do not go to work, school, or public areas.   • Avoid using public transportation, ride-sharing, or taxis.    Separate yourself from other people and animals in your home  People  As much as possible, you should stay in a specific room and away from other people in your home. Also, you should use a separate bathroom, if available.    Animals  You should restrict contact with pets and other animals while you are sick with COVID-19, just like you would around other people. When possible, have another member of your household care for your animals while you are sick. If you are sick with COVID-19, avoid contact with your pet, including petting, snuggling, being kissed or licked, and sharing food. If you must care for your pet or be around animals while you are sick, wash your hands before and after you interact with pets and wear a facemask. See COVID-19 and Animals for more information.    Call ahead before visiting your doctor  If you have a medical appointment, call the healthcare provider and tell them that you have or may have COVID-19. This information will help the healthcare provider’s office take steps to keep other people from getting infected or exposed.    Wear a facemask  You should wear a facemask when you are around other people (e.g., sharing a room or vehicle) or pets and before you enter a  healthcare provider’s office.     If you are not able to wear a facemask (for example, because it causes trouble breathing), then people who live with you should not stay in the same room with you, or they should wear a facemask if they enter your room.    Cover your coughs and sneezes  • Cover your mouth and nose with a tissue when you cough or sneeze.   • Throw used tissues in a lined trash can.   • Immediately wash your hands with soap and water for at least 20 seconds or, if soap and water are not available, clean your hands with an alcohol-based hand  that contains at least 60% alcohol.    Clean your hands often  • Wash your hands often with soap and water for at least 20 seconds, especially after blowing your nose, coughing, or sneezing; going to the bathroom; and before eating or preparing food.     • If soap and water are not readily available, use an alcohol-based hand  with at least 60% alcohol, covering all surfaces of your hands and rubbing them together until they feel dry.    • Soap and water are the best option if hands are visibly dirty. Avoid touching your eyes, nose, and mouth with unwashed hands.    Avoid sharing personal household items  • You should not share dishes, drinking glasses, cups, eating utensils, towels, or bedding with other people or pets in your home.   • After using these items, they should be washed thoroughly with soap and water.    Clean all “high-touch” surfaces everyday  • High touch surfaces include counters, tabletops, doorknobs, bathroom fixtures, toilets, phones, keyboards, tablets, and bedside tables.   • Also, clean any surfaces that may have blood, stool, or body fluids on them.   • Use a household cleaning spray or wipe, according to the label instructions. Labels contain instructions for safe and effective use of the cleaning product, including precautions you should take when applying the product, such as wearing gloves and making sure you have  good ventilation during use of the product.    Monitor your symptoms  • Seek prompt medical attention if your illness is worsening (e.g., difficulty breathing).   • Before seeking care, call your healthcare provider and tell them that you have, or are being evaluated for, COVID-19.   • Put on a facemask before you enter the facility.     • These steps will help the healthcare provider’s office to keep other people in the office or waiting room from getting infected or exposed.   • Persons who are placed under active monitoring or facilitated self-monitoring should follow instructions provided by their local health department or occupational health professionals, as appropriate.  • If you have a medical emergency and need to call 911, notify the dispatch personnel that you have, or are being evaluated for COVID-19. If possible, put on a facemask before emergency medical services arrive.    Discontinuing home isolation  Patients with confirmed COVID-19 should remain under home isolation precautions until the risk of secondary transmission to others is thought to be low. The decision to discontinue home isolation precautions should be made on a case-by-case basis, in consultation with healthcare providers and state and local health departments.    The below content are for household members, intimate partners, and caregivers of a patient with symptomatic laboratory-confirmed COVID-19 or a patient under investigation:    Household members, intimate partners, and caregivers may have close contact with a person with symptomatic, laboratory-confirmed COVID-19 or a person under investigation.     Close contacts should monitor their health; they should call their healthcare provider right away if they develop symptoms suggestive of COVID-19 (e.g., fever, cough, shortness of breath)     Close contacts should also follow these recommendations:  • Make sure that you understand and can help the patient follow their healthcare  provider’s instructions for medication(s) and care. You should help the patient with basic needs in the home and provide support for getting groceries, prescriptions, and other personal needs.  • Monitor the patient’s symptoms. If the patient is getting sicker, call his or her healthcare provider and tell them that the patient has laboratory-confirmed COVID-19. This will help the healthcare provider’s office take steps to keep other people in the office or waiting room from getting infected. Ask the healthcare provider to call the local or [facility] health department for additional guidance. If the patient has a medical emergency and you need to call 911, notify the dispatch personnel that the patient has, or is being evaluated for COVID-19.  • Household members should stay in another room or be  from the patient as much as possible. Household members should use a separate bedroom and bathroom, if available.  • Prohibit visitors who do not have an essential need to be in the home.  • Household members should care for any pets in the home. Do not handle pets or other animals while sick.  For more information, see COVID-19 and Animals.  • Make sure that shared spaces in the home have good air flow, such as by an air conditioner or an opened window, weather permitting.  • Perform hand hygiene frequently. Wash your hands often with soap and water for at least 20 seconds or use an alcohol-based hand  that contains 60 to 95% alcohol, covering all surfaces of your hands and rubbing them together until they feel dry. Soap and water should be used preferentially if hands are visibly dirty.  • Avoid touching your eyes, nose, and mouth with unwashed hands.  • The patient should wear a facemask when you are around other people. If the patient is not able to wear a facemask (for example, because it causes trouble breathing), you, as the caregiver, should wear a mask when you are in the same room as the  patient.  • Wear a disposable facemask and gloves when you touch or have contact with the patient’s blood, stool, or body fluids, such as saliva, sputum, nasal mucus, vomit, or urine.   o Throw out disposable facemasks and gloves after using them. Do not reuse.  o When removing personal protective equipment, first remove and dispose of gloves. Then, immediately clean your hands with soap and water or alcohol-based hand . Next, remove and dispose of facemask, and immediately clean your hands again with soap and water or alcohol-based hand .  • Avoid sharing household items with the patient. You should not share dishes, drinking glasses, cups, eating utensils, towels, bedding, or other items. After the patient uses these items, you should wash them thoroughly (see below “Wash laundry thoroughly”).  • Clean all “high-touch” surfaces, such as counters, tabletops, doorknobs, bathroom fixtures, toilets, phones, keyboards, tablets, and bedside tables, every day. Also, clean any surfaces that may have blood, stool, or body fluids on them.   o Use a household cleaning spray or wipe, according to the label instructions. Labels contain instructions for safe and effective use of the cleaning product including precautions you should take when applying the product, such as wearing gloves and making sure you have good ventilation during use of the product.  • Wash laundry thoroughly.   o Immediately remove and wash clothes or bedding that have blood, stool, or body fluids on them.  o Wear disposable gloves while handling soiled items and keep soiled items away from your body. Clean your hands (with soap and water or an alcohol-based hand ) immediately after removing your gloves.  o Read and follow directions on labels of laundry or clothing items and detergent. In general, using a normal laundry detergent according to washing machine instructions and dry thoroughly using the warmest temperatures  recommended on the clothing label.  • Place all used disposable gloves, facemasks, and other contaminated items in a lined container before disposing of them with other household waste. Clean your hands (with soap and water or an alcohol-based hand ) immediately after handling these items. Soap and water should be used preferentially if hands are visibly dirty.  • Discuss any additional questions with your state or local health department or healthcare provider.    Adapted from information provided by the Centers for Disease Control and Prevention.  For more information, visit https://www.cdc.gov/coronavirus/2019-ncov/hcp/guidance-prevent-spread.html

## 2022-03-07 DIAGNOSIS — K21.9 GASTROESOPHAGEAL REFLUX DISEASE: ICD-10-CM

## 2022-03-07 RX ORDER — PANTOPRAZOLE SODIUM 40 MG/1
40 TABLET, DELAYED RELEASE ORAL DAILY
Qty: 90 TABLET | Refills: 3 | Status: CANCELLED | OUTPATIENT
Start: 2022-03-07

## 2022-03-08 DIAGNOSIS — K21.9 GASTROESOPHAGEAL REFLUX DISEASE: ICD-10-CM

## 2022-03-08 RX ORDER — PANTOPRAZOLE SODIUM 40 MG/1
40 TABLET, DELAYED RELEASE ORAL DAILY
Qty: 90 TABLET | Refills: 3 | OUTPATIENT
Start: 2022-03-08

## 2022-03-10 ENCOUNTER — OFFICE VISIT (OUTPATIENT)
Dept: ENDOCRINOLOGY | Age: 47
End: 2022-03-10

## 2022-03-10 VITALS
HEIGHT: 68 IN | HEART RATE: 83 BPM | SYSTOLIC BLOOD PRESSURE: 138 MMHG | WEIGHT: 191.2 LBS | RESPIRATION RATE: 20 BRPM | OXYGEN SATURATION: 99 % | DIASTOLIC BLOOD PRESSURE: 85 MMHG | BODY MASS INDEX: 28.98 KG/M2

## 2022-03-10 DIAGNOSIS — L70.9 ACNE, UNSPECIFIED ACNE TYPE: ICD-10-CM

## 2022-03-10 DIAGNOSIS — E05.20 TOXIC MULTINODUL GOITER: ICD-10-CM

## 2022-03-10 DIAGNOSIS — L65.9 NON-SCARRING HAIR LOSS: ICD-10-CM

## 2022-03-10 DIAGNOSIS — E05.90 SUBCLINICAL HYPERTHYROIDISM: Primary | ICD-10-CM

## 2022-03-10 PROBLEM — E04.2 NONTOXIC MULTINODULAR GOITER: Status: RESOLVED | Noted: 2019-05-21 | Resolved: 2022-03-10

## 2022-03-10 PROCEDURE — 99214 OFFICE O/P EST MOD 30 MIN: CPT | Performed by: INTERNAL MEDICINE

## 2022-03-10 NOTE — PROGRESS NOTES
Chief Complaint  Hypothyroidism and Hashimoto's Thyroiditis    Subjective          Radha Robles presents to [facility] ENDOCRINOLOGY  History of Present Illness  46-year-old  female who has been seen at this endocrinology clinic with Dr. Lala on 1 occasion 5/21/2019 without follow-up.  Today is the first visit with me at this endocrinology clinic.  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  She was seen in 2019 for possible hypothyroidism.   At that time a  Multinodular goiter was discovered.  On March 4, 2019 multiple nodules and cysts were noted and a fine-needle aspiration was recommended.    Past medical history includes hypertension GERD    3/10/22 She presents now almost 3 years after initial visit with concern for a suppressed TSH..   She recently met with gynecologist for possible hormone therapy as she had a week of requent weeping. Now resolved. During visit labs ordered demonstrate this suppressed TSH.    Suppressed TSH Symptoms:   No heat intolerance.   Heart racing noticed. Last pm pulse 50-90 bpm for about 40 min and comes and goes. Episodes when quiet has happened for years. She was placed on a 2 week monitor that did not catch any abnormalities years ago.   Weight : no change of an increase or decrease over 10 lbs.  Appetite stable.    Hair thinning since late 30's and has female non scarring alopecia.took a few days-months of 200 mg spironolactone and in that short. She has also been taking finasteride [Proscar] 5 mg daily.without improvement.   Acne face only since age 12. acutane twice in last 10 years  Low libido  Menses: Hysterectomy in 2021 and ovaries remain.    Review of Systems - Negative except as noted above     Family history : In 2019 met biological family in 2019 and found maternal side has hyperthyroidism.     Past Medical History:   Diagnosis Date   • Attention deficit disorder    • Dyslipidemia    • GERD  "(gastroesophageal reflux disease)    • Heavy periods    • High blood pressure    • Hypertension    • Menses painful    • PONV (postoperative nausea and vomiting)      Past Surgical History:   Procedure Laterality Date   • BREAST AUGMENTATION      2005, Chrystal Roberts    • COLONOSCOPY N/A 10/30/2018    Two 3 to 4 mm polyps in the sigmoid colon, IH. PATH: Hyperplastic polyp   • TOTAL LAPAROSCOPIC HYSTERECTOMY N/A 6/22/2021    Procedure: TOTAL LAPAROSCOPIC HYSTERECTOMY BILATERAL SALPINGECTOMY;  Surgeon: Minerva Baer MD;  Location: [facility] OR;  Service: Gynecology;  Laterality: N/A;   • WISDOM TOOTH EXTRACTION     No Known Allergies     Current Outpatient Medications on File Prior to Visit   Medication Sig Dispense Refill   • doxycycline (VIBRAMYCIN) 100 MG capsule Take one capsule by mouth twice daily with food 60 capsule 1   • pantoprazole (PROTONIX) 40 MG EC tablet Take 1 tablet by mouth Daily. 90 tablet 3   • Pilocarpine HCl (Vuity) 1.25 % Administer 1 drop to both eyes Daily. 2.5 mL 3   • valsartan (DIOVAN) 160 MG tablet Take 1 tablet by mouth Daily. 90 tablet 0   • amphetamine-dextroamphetamine (ADDERALL) 15 MG tablet Take 1 tablet by mouth 2 (two) times daily 60 tablet 0   • doxycycline (VIBRAMYCIN) 50 MG capsule Take one capsule by mouth daily with food. 30 capsule 3   • finasteride (PROSCAR) 5 MG tablet Take 0.25 tablets by mouth Daily. 24 tablet 1   • finasteride (PROSCAR) 5 MG tablet Take 0.25 tablets by mouth Daily. 24 tablet 3   • valACYclovir (VALTREX) 500 MG tablet Take 1 tablet by mouth 2 (Two) Times a Day staring 2 days before. 10 tablet 0     No current facility-administered medications on file prior to visit.       Objective   Vital Signs:   /85   Pulse 83   Resp 20   Ht 172.7 cm (68\")   Wt 86.7 kg (191 lb 3.2 oz)   SpO2 99%   BMI 29.07 kg/m²       Physical Exam  Vitals and nursing note reviewed.   HENT:      Head: Normocephalic. Hair is abnormal.      Comments: Thinning frontal " hair with scalp visible. No scarring.     Mouth/Throat:      Mouth: Mucous membranes are moist.   Neck:      Thyroid: Thyroid mass and thyromegaly present. No thyroid tenderness.      Vascular: No carotid bruit.      Trachea: Trachea and phonation normal.      Comments: Thyroid is enlarged, boggy in texture. No palpable nodules except left mid lobe has a 1.5 cm likely nodule that is not tender. No thrill or bruit  Cardiovascular:      Rate and Rhythm: Normal rate.      Pulses: Normal pulses.           Dorsalis pedis pulses are 2+ on the right side and 2+ on the left side.        Posterior tibial pulses are 2+ on the right side and 2+ on the left side.      Heart sounds: Normal heart sounds.   Pulmonary:      Effort: Pulmonary effort is normal.      Breath sounds: Normal breath sounds. No wheezing or rhonchi.   Abdominal:      General: Bowel sounds are normal.      Palpations: Abdomen is soft.   Musculoskeletal:         General: No swelling. Normal range of motion.      Cervical back: Normal range of motion and neck supple.   Feet:      Right foot:      Protective Sensation: 10 sites tested. 10 sites sensed.      Skin integrity: Skin integrity normal.      Toenail Condition: Right toenails are normal.      Left foot:      Protective Sensation: 10 sites tested. 10 sites sensed.      Skin integrity: Skin integrity normal.      Toenail Condition: Left toenails are normal.   Skin:     General: Skin is warm and dry.   Neurological:      Mental Status: She is alert and oriented to person, place, and time. Mental status is at baseline.   Psychiatric:         Mood and Affect: Mood normal.         Behavior: Behavior normal.         Judgment: Judgment normal.        Result Review :   The following data was reviewed by: Becka Tobias MD on 03/10/2022:    Gyn office   2/25/2022 [facility]     T3 uptake                  29% reference 22-35    Total T4              9.7 MCG/DL    reference range 5.1-11.9  Free  T4 index Value   2.8 reference   reference 1.4-3.8  TSH                0.13  MIUs/L    reference 0.4-4.5      Component   Ref Range & Units 9 d ago   (3/1/22) 2 yr ago   (6/4/19) 2 yr ago   (4/5/19) 4 yr ago   (8/14/17)   TSH   0.27 - 4.20 u(iU)/mL 0.087 Low   0.943 R  0.745  0.687 R    Resulting Agency [facility]     Component   Ref Range & Units 9 d ago   (3/1/22) 2 yr ago   (6/4/19) 2 yr ago   (4/5/19)   Free T4   0.7 - 1.48 ng/dL 1.26  1.34 R  1.24 R      Component   Ref Range & Units 9 d ago   (3/1/22) 3 mo ago   (11/15/21) 7 mo ago   (7/29/21) 8 mo ago   (6/16/21) 11 mo ago   (3/31/21) 1 yr ago   (12/15/20) 1 yr ago   (7/10/20)   Sodium   136 - 145 mmol/L 138  139 R  137 R  137  137 R  138  134 Low  R    Potassium   3.5 - 5.1 mmol/L 3.7  4.1  4.4  4.1 R  4.1  4.2 R  3.9 R    Chloride   98 - 107 mmol/L 103  99  101  103  102  104  101    Total CO2   22 - 29 mmol/L 22  19 Low  R  26 R   22 R   25 R    Glucose   74 - 99 mg/dL 89  80  83  83 R  72 Low   96 R  87    BUN   7 - 19 mg/dL 9  13 R  12 R  16 R  10 R  17 R  16 R    Creatinine   0.55 - 1.02 mg/dL 0.65  0.7 R  0.6 Low  R  0.75 R  0.6 Low  R  0.67 R  0.8 R    BUN/Creatinine Ratio   10.0 - 20.0 RATIO 14.5  20.0  20.0  21.3 R  18.0  25.4 High  R  21.0 High     Est GFR by Clearance   >60 /1.73 m2 >60  >60 CM  >60 CM   >60 CM   >60 CM    Comment: (note)   Results do not take into account body mass.  Valid for patients 18   to 70 years of age.   Total Protein   6.4 - 8.2 g/dL 6.9  7.5 R  7.6 R   7.5 R  7.2 R  7.0 R    Albumin   3.5 - 5.2 g/dL 4.3  4.3 R  4.6 R   4.5 R  4.30 R  4.1 R    Globulin   1.5 - 4.5 g/dL 2.6  3.2  3.0   3.0  2.9 R  2.9    A/G Ratio   1.1 - 2.5 RATIO 1.6  1.3  1.5   1.5  1.5 R  1.4    Calcium   8.4 - 10.2 mg/dL 9.9  10.4 High   10.5 High   10.0 R  9.9  9.7 R  9.9    Total Bilirubin   0.2 - 1.2 mg/dL <0.2 Low   0.3 R  0.5 R   0.6 R  0.3 R  0.3 R    AST (SGOT)   10 - 35  U/L 20  20 R  26 R   26 R  16 R  15 R    ALT (SGPT)   10 - 35 U/L 17  20 R  20 R   33 R  21 R  11 Low  R    Alkaline Phosphatase   40 - 150 U/L 61  72 R  73 R   53 R  76 R  67 R    Resulting Agency [facility]  [facility]  [facility]              Specimen Collected: 03/01/22 14:11 Last Resulted: 03/01/22 20:54   Received From: [facility]  Result Received: 03/10/22 15:31              Component   Ref Range & Units 9 d ago   (3/1/22) 3 mo ago   (11/15/21) 7 mo ago   (7/29/21) 8 mo ago   (6/16/21) 11 mo ago   (3/31/21) 1 yr ago   (12/15/20) 1 yr ago   (7/10/20)   WBC   4.5 - 11.0 10*3/uL 7.11  7.35  6.23  7.02 R  5.87  6.96 R  9.18    RBC   4.0 - 5.2 10*6/uL 4.08  4.29  4.38  4.49 R  4.45  4.44 R  4.30    Hemoglobin   12.0 - 16.0 g/dL 12.0  12.6  12.5  13.0 R  12.5  13.0 R  12.3    Hematocrit   36.0 - 46.0 % 37.4  39.0  39.4  38.6 R  39.2  39.5 R  39.8         Assessment and Plan    46 year old  female presenting with subacute hyperthyroidism and multinodular goiter with a dominant nodule that may need FNA as one was indicated in 2019.   Patient has no elevation of T4,   Plan imaging and labs. No medication at this time.       Diagnoses and all orders for this visit:    1. Subclinical hyperthyroidism (Primary)  -     Thyroid Stimulating Immunoglobulin  -     Thyrotropin Receptor Antibody  -     T3, Free  -     NM Thyroid Uptake & Scan  -     US Thyroid  -     Thyroid Antibodies  -     Hemoglobin A1c; Future  -     DHEA-Sulfate; Future  -     Androstenedione; Future  -     Testosterone, Total, Women, Children, and Hypogonadal Males; Future  -     Prolactin; Future    2. Toxic multinodul goiter  Assessment & Plan:  Thyroid nodules evaluate with ultrasound and NM thyroid scan to differentiate cold vs hot nodules in patient with subclinical  hyperthyroidism.      3. Acne, unspecified acne type  Assessment & Plan:  Chronic adult acne and frontal hair loss for years concerning for chronic elevated androgen as seen in metabolic syndrome x     Orders:  -     Hemoglobin A1c; Future  -     DHEA-Sulfate; Future  -     Androstenedione; Future  -     Testosterone, Total, Women, Children, and Hypogonadal Males; Future  -     Prolactin; Future    4. Non-scarring hair loss    I spent 30 minutes caring for Radha on this date of service. This time includes time spent by me in the following activities:reviewing tests, performing a medically appropriate examination and/or evaluation , counseling and educating the patient/family/caregiver and ordering medications, tests, or procedures   Follow Up   Return in about 4 weeks (around 4/7/2022) for Recheck.  Patient was given instructions and counseling regarding her condition or for health maintenance advice. Please see specific information pulled into the AVS if appropriate.

## 2022-03-11 NOTE — ASSESSMENT & PLAN NOTE
Thyroid nodules evaluate with ultrasound and NM thyroid scan to differentiate cold vs hot nodules in patient with subclinical hyperthyroidism.

## 2022-03-11 NOTE — ASSESSMENT & PLAN NOTE
Chronic adult acne and frontal hair loss for years concerning for chronic elevated androgen as seen in metabolic syndrome x

## 2022-03-24 ENCOUNTER — HOSPITAL ENCOUNTER (OUTPATIENT)
Dept: NUCLEAR MEDICINE | Facility: HOSPITAL | Age: 47
Discharge: HOME | End: 2022-03-24

## 2022-03-24 PROCEDURE — 78014 THYROID IMAGING W/BLOOD FLOW: CPT

## 2022-03-24 PROCEDURE — 0 SODIUM IODIDE 3.7 MBQ CAPSULE: Performed by: INTERNAL MEDICINE

## 2022-03-24 PROCEDURE — A9516 IODINE I-123 SOD IODIDE MIC: HCPCS | Performed by: INTERNAL MEDICINE

## 2022-03-24 RX ORDER — SODIUM IODIDE I 123 100 UCI/1
1 CAPSULE, GELATIN COATED ORAL
Status: COMPLETED | OUTPATIENT
Start: 2022-03-24 | End: 2022-03-24

## 2022-03-24 RX ADMIN — Medication 1 CAPSULE: at 13:56

## 2022-03-25 ENCOUNTER — HOSPITAL ENCOUNTER (OUTPATIENT)
Dept: ULTRASOUND IMAGING | Facility: HOSPITAL | Age: 47
Discharge: HOME | End: 2022-03-25

## 2022-03-25 ENCOUNTER — HOSPITAL ENCOUNTER (OUTPATIENT)
Dept: NUCLEAR MEDICINE | Facility: HOSPITAL | Age: 47
Discharge: HOME | End: 2022-03-25

## 2022-03-25 PROCEDURE — 76536 US EXAM OF HEAD AND NECK: CPT

## 2022-03-30 ENCOUNTER — OFFICE VISIT (OUTPATIENT)
Dept: GASTROENTEROLOGY | Facility: CLINIC | Age: 47
End: 2022-03-30

## 2022-03-30 VITALS — BODY MASS INDEX: 28.89 KG/M2 | HEIGHT: 68 IN | TEMPERATURE: 97.3 F | WEIGHT: 190.6 LBS

## 2022-03-30 DIAGNOSIS — K63.5 HYPERPLASTIC COLONIC POLYP, UNSPECIFIED PART OF COLON: ICD-10-CM

## 2022-03-30 DIAGNOSIS — K21.9 GASTROESOPHAGEAL REFLUX DISEASE: ICD-10-CM

## 2022-03-30 DIAGNOSIS — K21.9 GASTROESOPHAGEAL REFLUX DISEASE, UNSPECIFIED WHETHER ESOPHAGITIS PRESENT: Primary | ICD-10-CM

## 2022-03-30 PROCEDURE — 99214 OFFICE O/P EST MOD 30 MIN: CPT | Performed by: NURSE PRACTITIONER

## 2022-03-30 RX ORDER — PANTOPRAZOLE SODIUM 40 MG/1
40 TABLET, DELAYED RELEASE ORAL DAILY
Qty: 90 TABLET | Refills: 3 | Status: SHIPPED | OUTPATIENT
Start: 2022-03-30

## 2022-03-30 NOTE — PROGRESS NOTES
Chief Complaint   Patient presents with   • Heartburn       Radha Robles is a  46 y.o. female here for a follow up visit for GERD.    HPI  46-year-old female presents today for follow-up visit for GERD.  She is a patient of Dr. Mendez.  She was last seen in the office by me on 12/13/2019.  She has a history of GERD and admits she does well as long she takes Protonix 40 mg daily.  Occasionally she might have a flareup depending on stress or what she eats but most the time she admits she does really well.  Had an EGD before.  Her last screening colonoscopy was on 10/2018.  She does have a history of hyperplastic colon polyps.  Recall next year.  She denies any dysphagia, nausea and vomiting, diarrhea, constipation, rectal bleeding or melena.  She admits her appetite is good and her weight is stable.  Since she was adopted her GI family history is unknown.  Past Medical History:   Diagnosis Date   • Attention deficit disorder    • Dyslipidemia    • GERD (gastroesophageal reflux disease)    • Heavy periods    • High blood pressure    • Hypertension    • Menses painful    • PONV (postoperative nausea and vomiting)        Past Surgical History:   Procedure Laterality Date   • [facility]    • COLONOSCOPY N/A 10/30/2018    Two 3 to 4 mm polyps in the sigmoid colon, IH. PATH: Hyperplastic polyp   • TOTAL LAPAROSCOPIC HYSTERECTOMY N/A 6/22/2021    Procedure: TOTAL LAPAROSCOPIC HYSTERECTOMY BILATERAL SALPINGECTOMY;  Surgeon: Minerva Baer MD;  Location: [facility];  Service: Gynecology;  Laterality: N/A;   • WISDOM TOOTH EXTRACTION         Scheduled Meds:    Continuous Infusions:No current facility-administered medications for this visit.      PRN Meds:.    No Known Allergies    Social History     Socioeconomic History   • Marital status:    Tobacco Use   • Smoking status: Never Smoker   • Smokeless tobacco: Never Used   Vaping Use   • Vaping Use: Never used   Substance  and Sexual Activity   • Alcohol use: Yes     Comment: social   • Drug use: No   • Sexual activity: Yes     Partners: Male     Birth control/protection: None       Family History   Adopted: Yes   Problem Relation Age of Onset   • Malig Hyperthermia Neg Hx        Review of Systems   Constitutional: Negative for appetite change, chills, diaphoresis, fatigue, fever and unexpected weight change.   HENT: Negative for nosebleeds, postnasal drip, sore throat, trouble swallowing and voice change.    Respiratory: Negative for cough, choking, chest tightness, shortness of breath, wheezing and stridor.    Cardiovascular: Negative for chest pain, palpitations and leg swelling.   Gastrointestinal: Negative for abdominal distention, abdominal pain, anal bleeding, blood in stool, constipation, diarrhea, nausea, rectal pain and vomiting.   Endocrine: Negative for polydipsia, polyphagia and polyuria.   Musculoskeletal: Negative for gait problem.   Skin: Negative for rash and wound.   Allergic/Immunologic: Negative for food allergies.   Neurological: Negative for dizziness, speech difficulty and light-headedness.   Psychiatric/Behavioral: Negative for confusion, self-injury, sleep disturbance and suicidal ideas.       Vitals:    03/30/22 1502   Temp: 97.3 °F (36.3 °C)       Physical Exam  Constitutional:       General: She is not in acute distress.     Appearance: She is well-developed. She is not ill-appearing.   HENT:      Head: Normocephalic.   Eyes:      Pupils: Pupils are equal, round, and reactive to light.   Cardiovascular:      Rate and Rhythm: Normal rate and regular rhythm.      Heart sounds: Normal heart sounds.   Pulmonary:      Effort: Pulmonary effort is normal.      Breath sounds: Normal breath sounds.   Abdominal:      General: Bowel sounds are normal. There is no distension.      Palpations: Abdomen is soft. There is no mass.      Tenderness: There is no abdominal tenderness. There is no guarding or rebound.       Hernia: No hernia is present.   Musculoskeletal:         General: Normal range of motion.   Skin:     General: Skin is warm and dry.   Neurological:      Mental Status: She is alert and oriented to person, place, and time.   Psychiatric:         Speech: Speech normal.         Behavior: Behavior normal.         Judgment: Judgment normal.         NM Thyroid Uptake & Scan    Result Date: 3/25/2022  1. Normal 24-hour radioiodine uptake. 2. Heterogeneity of the gland consistent with a multinodular gland. There is relatively increased uptake in the anterior and medial right mid thyroid lobe. This may be a hyperfunctioning nodule. Correlation with more recent anatomic imaging suggested.  This report was finalized on 3/25/2022 6:24 PM by Dr. Rasta Linn M.D.         Diagnoses and all orders for this visit:    1. Gastroesophageal reflux disease, unspecified whether esophagitis present (Primary)    2. Hyperplastic colonic polyp, unspecified part of colon    3. Gastroesophageal reflux disease  -     pantoprazole (PROTONIX) 40 MG EC tablet; Take 1 tablet by mouth Daily.  Dispense: 90 tablet; Refill: 3    GERD seems well controlled on Protonix 40 mg daily.  Continue GERD precautions.  I did discuss with her the pros and cons of taking a PPI long-term.  Since she has been doing so well she is hesitant to go down on it at this time.  I advised her we will try again at her next visit to decrease the dose and see how she does.  Bowels are moving well currently.  She will be due for screening colonoscopy again next year due to her history.  At that time I would also advise her to do an EGD to see how well the Protonix is actually working.  Patient is agreeable to doing both scopes next year.  Overall she seems to be doing well.  Patient to call the office with any issues.  Patient to follow-up with me in 1 year.  Patient is agreeable to the plan.

## 2022-04-19 ENCOUNTER — TELEPHONE (OUTPATIENT)
Dept: ENDOCRINOLOGY | Age: 47
End: 2022-04-19

## 2022-04-28 ENCOUNTER — OFFICE VISIT (OUTPATIENT)
Dept: ENDOCRINOLOGY | Age: 47
End: 2022-04-28

## 2022-04-28 VITALS
WEIGHT: 188 LBS | HEART RATE: 96 BPM | HEIGHT: 68 IN | BODY MASS INDEX: 28.49 KG/M2 | RESPIRATION RATE: 20 BRPM | OXYGEN SATURATION: 97 % | SYSTOLIC BLOOD PRESSURE: 135 MMHG | DIASTOLIC BLOOD PRESSURE: 85 MMHG

## 2022-04-28 DIAGNOSIS — E05.20 TOXIC MULTINODULAR GOITER W/O CRISIS: ICD-10-CM

## 2022-04-28 DIAGNOSIS — N95.1 PERIMENOPAUSAL: ICD-10-CM

## 2022-04-28 DIAGNOSIS — L65.9 HAIR LOSS: ICD-10-CM

## 2022-04-28 DIAGNOSIS — E05.90 SUBCLINICAL HYPERTHYROIDISM: Primary | ICD-10-CM

## 2022-04-28 PROCEDURE — 99214 OFFICE O/P EST MOD 30 MIN: CPT | Performed by: INTERNAL MEDICINE

## 2022-04-28 RX ORDER — SPIRONOLACTONE 25 MG/1
50 TABLET ORAL 2 TIMES DAILY
Qty: 120 TABLET | Refills: 2 | Status: SHIPPED | OUTPATIENT
Start: 2022-04-28 | End: 2022-06-09 | Stop reason: SDUPTHER

## 2022-04-28 NOTE — PROGRESS NOTES
"Chief Complaint  Hyperthyroidism (Subclinical), Hypothyroidism, and Hashimoto's Thyroiditis    Subjective          Radha Robles presents to [facility] ENDOCRINOLOGY  History of Present Illness     Past Medical History:   Diagnosis Date   • Attention deficit disorder    • Dyslipidemia    • GERD (gastroesophageal reflux disease)    • Heavy periods    • High blood pressure    • Hypertension    • Menses painful    • PONV (postoperative nausea and vomiting)      Past Surgical History:   Procedure Laterality Date   • BREAST AUGMENTATION      2005, Chrystal Roberts    • COLONOSCOPY N/A 10/30/2018    Two 3 to 4 mm polyps in the sigmoid colon, IH. PATH: Hyperplastic polyp   • TOTAL LAPAROSCOPIC HYSTERECTOMY N/A 6/22/2021    Procedure: TOTAL LAPAROSCOPIC HYSTERECTOMY BILATERAL SALPINGECTOMY;  Surgeon: Minerva Baer MD;  Location: [facility];  Service: Gynecology;  Laterality: N/A;   • WISDOM TOOTH EXTRACTION         Objective   Vital Signs:   /85   Pulse 96   Resp 20   Ht 172.7 cm (68\")   Wt 85.3 kg (188 lb)   SpO2 97%   BMI 28.59 kg/m²     Physical Exam  Vitals and nursing note reviewed.   HENT:      Head: Normocephalic.      Mouth/Throat:      Mouth: Mucous membranes are moist.   Cardiovascular:      Rate and Rhythm: Normal rate.   Musculoskeletal:         General: No swelling. Normal range of motion.      Cervical back: Normal range of motion and neck supple.   Skin:     General: Skin is warm and dry.   Neurological:      Mental Status: She is alert and oriented to person, place, and time. Mental status is at baseline.   Psychiatric:         Mood and Affect: Mood normal.         Behavior: Behavior normal.         Judgment: Judgment normal.        Result Review :   The following data was reviewed by: Becka Tobias MD on 04/28/2022:           Narrative & Impression   NUCLEAR MEDICINE THYROID UPTAKE AND SCAN     HISTORY: Abnormal thyroid function test. Suspected toxic " nodule.     TECHNIQUE: The patient ingested 236 uCi of I-123 and at 24 hours an  uptake measurement was made and three images of the gland were acquired  which are correlated with a thyroid sonogram from June 2019. There is no  more recent thyroid imaging for correlation.     FINDINGS: 24-hour uptake is normal at 21%. Normal range is 8-35%.     Images of the gland show heterogeneous activity bilaterally with  diminished activity around the left mid and upper pole and the right  lower pole. Relatively increased uptake is observed around the anterior  medial right midpole.     IMPRESSION:  1. Normal 24-hour radioiodine uptake.  2. Heterogeneity of the gland consistent with a multinodular gland.  There is relatively increased uptake in the anterior and medial right  mid thyroid lobe. This may be a hyperfunctioning nodule. Correlation  with more recent anatomic imaging suggested.     This report was finalized on 3/25/2022 6:24 PM by Dr. Rasta Linn M.D.        US THYROID-     CLINICAL: Hyperthyroidism, suspected goiter.     COMPARISON: Made to previous ultrasound examinations dating back to  03/14/2019, also correlation with thyroid uptake and scan from  03/24/2022.     FINDINGS: The right lobe measures 5.2 x 2.0 x 1.7 cm, the left lobe  measures 5.2 x 2.2 x 1.4 cm and the isthmus 5 mm.     Located within the midpole of the right thyroid gland there are 3  [id number] nodules. The echotexture is between spongiform and  hypoechoic solid with the largest of these measuring 16 mm in maximum  diameter. These are quite similar to the previous 2019 examination. At  best, ACR TR Category 4. The largest is located near the isthmus and  likely corresponds to the nodule having increased activity on thyroid  scan.     Located within the isthmus, at the site of a large cyst seen in 2019, is  a isoechoic solid nodule containing a small cystic area peripherally  measuring 12 x 8 x 10 mm. ACR TR category 3. This does not qualify  for  fine-needle aspiration and is similar to the previous examination.     Within the inferior pole of the left thyroid there is a predominantly  solid, slightly hypoechoic nodule containing microcalcifications, ACR TR  Category 5. It measures 19 x 11 x 14 mm. It measured 19 x 12 x 13 mm on  the 2019 examination. While this does qualify for fine-needle  aspiration, stability in size over the past 3 years supports a benign  etiology. This corresponds to subtle increase activity seen on previous  thyroid scan.     CONCLUSION: Multinodular thyroid as described above. It has remained  stable since 2019 with the exception of interval resolution of the large  isthmus cyst. Nodule within the medial mid right thyroid likely  corresponds to the focus of moderate increased activity seen on  03/24/2022 thyroid scan. Nodule within the inferior pole of the left  gland corresponds to subtle increased activity on thyroid scan.     This report was finalized on 3/28/2022 9:26 AM by Dr. Niraj Turner M.D.     Component   Ref Range & Units 1 mo ago 2 yr ago 3 yr ago 4 yr ago   TSH   0.27 - 4.20 u(iU)/mL 0.087 Low   0.943 R  0.745  0.687 R    Resulting Agency [facility]              Specimen Collected: 03/01/22 14:11 Last Resulted: 03/01/22 21:14   Received From: [facility]  Result Received: 03/10/22 15:31               Component      Latest Ref Rng & Units 3/11/2022 3/11/2022 3/11/2022 3/11/2022           3:19 PM  3:19 PM  3:19 PM  3:19 PM   Thyroid Peroxidase Antibody      0 - 34 IU/mL       Thyroglobulin Ab      0.0 - 0.9 IU/mL       Hemoglobin A1C      4.8 - 5.6 % 5.3      DHEA-Sulfate      41.2 - 243.7 ug/dL  130.0     Androstenedione      41 - 262 ng/dL   52    Testosterone, Total      ng/dL    16.0   Prolactin      4.8 - 23.3 ng/mL       Thyrotropin Receptor Antibody      0.00 - 1.75 IU/L       Thyroid Stimulating Immunoglobulin      0.00 - 0.55  IU/L       T3, Free      2.0 - 4.4 pg/mL         Component      Latest Ref Rng & Units 3/11/2022 3/11/2022 3/11/2022 3/11/2022           3:19 PM  3:19 PM  3:19 PM  3:19 PM   Thyroid Peroxidase Antibody      0 - 34 IU/mL    <8   Thyroglobulin Ab      0.0 - 0.9 IU/mL    <1.0   Prolactin      4.8 - 23.3 ng/mL 23.7 (H)      Thyrotropin Receptor Antibody      0.00 - 1.75 IU/L  <1.10     Thyroid Stimulating Immunoglobulin      0.00 - 0.55 IU/L   <0.10      Component      Latest Ref Rng & Units 3/11/2022           3:19 PM   T3, Free      2.0 - 4.4 pg/mL 3.4        Assessment and Plan   5/26/22 on or after lab draw.   Return visit 6 weeks     46-year-old  female with subclinicl hyperthyroid. Current imaging indicates toxic bilateral multinodular goiter. history of total hysterectomy with ovaries remaining suspect menopause associated hair loss, non-scarring.   Labs ordered.     For non-scarring hair loss. No clear excess androgen.  likely menopause and subclinical hyperthyroid. Autoimmune thyroiditis likely [TPO ab and Anty thyroglobulin negative. ]    Trial of spironolactone  25 mg daily for 2 wks then twice daily then increase by 25 mg every 2-4 weeks until reach 50 mg twice daily  Limit increasing dose if still having the diuretic effect    Subclinical hyperthyroid due to toxic nodules over producing thyroid hormone on both right and left lobe consistent with the nodules on the ultrasound. No biopsy recommended.    Diagnoses and all orders for this visit:    1. Subclinical hyperthyroidism (Primary)  -     T3, Free; Future  -     T4, Free; Future  -     TSH; Future  -     Estradiol; Future  -     Follicle Stimulating Hormone; Future  -     Basic Metabolic Panel; Future  -     spironolactone (ALDACTONE) 25 MG tablet; Take 2 tablets by mouth 2 (Two) Times a Day. Start as 25 mg daily and increase by 25 mg every 14 days as tolerated .  Dispense: 120 tablet; Refill: 2    2. Toxic multinodular goiter w/o crisis  -      T3, Free; Future  -     T4, Free; Future  -     TSH; Future  -     Estradiol; Future  -     Follicle Stimulating Hormone; Future  -     Basic Metabolic Panel; Future    3. Hair loss  -     T3, Free; Future  -     T4, Free; Future  -     TSH; Future  -     Estradiol; Future  -     Follicle Stimulating Hormone; Future  -     Basic Metabolic Panel; Future  -     spironolactone (ALDACTONE) 25 MG tablet; Take 2 tablets by mouth 2 (Two) Times a Day. Start as 25 mg daily and increase by 25 mg every 14 days as tolerated .  Dispense: 120 tablet; Refill: 2    4. Perimenopausal  -     T3, Free; Future  -     T4, Free; Future  -     TSH; Future  -     Estradiol; Future  -     Follicle Stimulating Hormone; Future  -     Basic Metabolic Panel; Future  -     spironolactone (ALDACTONE) 25 MG tablet; Take 2 tablets by mouth 2 (Two) Times a Day. Start as 25 mg daily and increase by 25 mg every 14 days as tolerated .  Dispense: 120 tablet; Refill: 2         I spent 38  minutes caring for Radha on this date of service. This time includes time spent by me in the following activities:reviewing tests, obtaining and/or reviewing a separately obtained history, performing a medically appropriate examination and/or evaluation , counseling and educating the patient/family/caregiver and ordering medications, tests, or procedures Discussed pathophysiology of toxic nodules. subclinica hyperthyroid and hair loss options. A dermatology consult is suggested. Trial of spironolactone may help. Risk and benefits reviewed. She has taken in past without hyperkalemia occurring.   At this time no antithyroid medication recommended as T3 and T4 are normal.   Follow Up   Return in about 5 weeks (around 6/2/2022). labs in 4 weeks  Patient was given instructions and counseling regarding her condition or for health maintenance advice. Please see specific information pulled into the AVS if appropriate.

## 2022-04-28 NOTE — PATIENT INSTRUCTIONS
5/26/22 on or after lab draw.   Return visit 6 weeks     For hair loss likely menopause and subclinical hyperthyroid  Trial of spironolactone  25 mg daily for 2 wks then twice daily then increase by 25 mg every 2-4 weeks until reach 50 mg twice daily  Limit increasing dose if still having the diuretic effect    Subclinical hyperthyroid due to toxic nodules over producing thyroid hormone on both right and left lobe consistent with the nodules on the ultrasound. No biopsy recommended.

## 2022-06-06 ENCOUNTER — LAB (OUTPATIENT)
Dept: LAB | Facility: HOSPITAL | Age: 47
End: 2022-06-06

## 2022-06-06 DIAGNOSIS — N95.1 PERIMENOPAUSAL: ICD-10-CM

## 2022-06-06 DIAGNOSIS — E05.90 SUBCLINICAL HYPERTHYROIDISM: ICD-10-CM

## 2022-06-06 DIAGNOSIS — L65.9 HAIR LOSS: ICD-10-CM

## 2022-06-06 DIAGNOSIS — E05.20 TOXIC MULTINODULAR GOITER W/O CRISIS: ICD-10-CM

## 2022-06-06 LAB
ANION GAP SERPL CALCULATED.3IONS-SCNC: 11.3 MMOL/L (ref 5–15)
BUN SERPL-MCNC: 6 MG/DL (ref 6–20)
BUN/CREAT SERPL: 8.6 (ref 7–25)
CALCIUM SPEC-SCNC: 9.5 MG/DL (ref 8.6–10.5)
CHLORIDE SERPL-SCNC: 103 MMOL/L (ref 98–107)
CO2 SERPL-SCNC: 22.7 MMOL/L (ref 22–29)
CREAT SERPL-MCNC: 0.7 MG/DL (ref 0.57–1)
EGFRCR SERPLBLD CKD-EPI 2021: 106.2 ML/MIN/1.73
ESTRADIOL SERPL HS-MCNC: 284 PG/ML
FSH SERPL-ACNC: 2.53 MIU/ML
GLUCOSE SERPL-MCNC: 88 MG/DL (ref 65–99)
POTASSIUM SERPL-SCNC: 4.1 MMOL/L (ref 3.5–5.2)
SODIUM SERPL-SCNC: 137 MMOL/L (ref 136–145)
T3FREE SERPL-MCNC: 3.4 PG/ML (ref 2–4.4)
T4 FREE SERPL-MCNC: 1.05 NG/DL (ref 0.93–1.7)
TSH SERPL DL<=0.05 MIU/L-ACNC: 0.38 UIU/ML (ref 0.27–4.2)

## 2022-06-06 PROCEDURE — 36415 COLL VENOUS BLD VENIPUNCTURE: CPT

## 2022-06-06 PROCEDURE — 84443 ASSAY THYROID STIM HORMONE: CPT

## 2022-06-06 PROCEDURE — 83001 ASSAY OF GONADOTROPIN (FSH): CPT

## 2022-06-06 PROCEDURE — 82670 ASSAY OF TOTAL ESTRADIOL: CPT

## 2022-06-06 PROCEDURE — 84481 FREE ASSAY (FT-3): CPT

## 2022-06-06 PROCEDURE — 80048 BASIC METABOLIC PNL TOTAL CA: CPT

## 2022-06-06 PROCEDURE — 84439 ASSAY OF FREE THYROXINE: CPT

## 2022-06-09 ENCOUNTER — OFFICE VISIT (OUTPATIENT)
Dept: ENDOCRINOLOGY | Age: 47
End: 2022-06-09

## 2022-06-09 VITALS
HEIGHT: 68 IN | WEIGHT: 190.6 LBS | BODY MASS INDEX: 28.89 KG/M2 | TEMPERATURE: 96.9 F | DIASTOLIC BLOOD PRESSURE: 72 MMHG | OXYGEN SATURATION: 98 % | SYSTOLIC BLOOD PRESSURE: 118 MMHG | HEART RATE: 89 BPM

## 2022-06-09 DIAGNOSIS — E05.20 TOXIC MULTINODUL GOITER: ICD-10-CM

## 2022-06-09 DIAGNOSIS — L65.9 HAIR LOSS: ICD-10-CM

## 2022-06-09 DIAGNOSIS — E05.90 SUBCLINICAL HYPERTHYROIDISM: ICD-10-CM

## 2022-06-09 DIAGNOSIS — N95.1 PERIMENOPAUSAL: ICD-10-CM

## 2022-06-09 DIAGNOSIS — L65.9 NON-SCARRING HAIR LOSS: ICD-10-CM

## 2022-06-09 DIAGNOSIS — R79.89 PROLACTIN INCREASED: Primary | ICD-10-CM

## 2022-06-09 PROCEDURE — 99214 OFFICE O/P EST MOD 30 MIN: CPT | Performed by: INTERNAL MEDICINE

## 2022-06-09 RX ORDER — SPIRONOLACTONE 50 MG/1
50 TABLET, FILM COATED ORAL 2 TIMES DAILY
Qty: 60 TABLET | Refills: 1 | Status: SHIPPED | OUTPATIENT
Start: 2022-06-09 | End: 2022-08-30

## 2022-06-09 NOTE — PROGRESS NOTES
Chief Complaint  Hyperthyroidism    Subjective          Radha Robles presents to [facility] ENDOCRINOLOGY  History of Present Illness   LOV 04/28/2022:  47-year-old  female presents for subclinical hyperthyroidism due to toxic multinodular goiter, nonscarring hair loss with negative antigen testing.    1.  Subclinical hyperthyroidism with a toxic multinodular goiter.  By RAYMUNDO the right middle lobe and left lobe nodule are consistent with the increased uptake.  Hyperthyroidism is due to toxic thyroid nodule.  Currently on no antithyroid medication.    Symptoms: denies anterior neck tenderness, dysphagia, symptoms of hyperthyroidism such as fatigue sudden weight loss, changes in bowel habits, skin changes.  See hair loss below.    Most recent TSH free T3 free T4 is within normal range.  Continue to monitor.  Symptoms of metromenorrhagia may certainly be due to intermittent exacerbation of hyperthyroidism.    2.  47-year-old status post laparoscopic hysterectomy ovaries remain.  Evaluation for menopause has included a FSH and estrogen level.  Results indicate patient ovulated therefore not in menopause at this time.    3.  Hair loss.  At last visit 3/11/2022 trial of testosterone DHT receptor blocker spironolactone started.  Concern is for any side effects.  Patient is on an ARB agent.  Trial of spironolactone  25 mg daily for 2 wks then twice daily then increase by 25 mg every 2-4 weeks until reach 50 mg twice daily  Limit increasing dose if still having the diuretic effect    Symptoms: Patient has been gradually increasing spironolactone 25 mg daily every 2 weeks she is currently on a total of 75 mg daily.  She is noticing increased urination.  No lightheadedness.  No chest pain.  No shortness of breath.  Hair loss is on the frontal area.  She has noticed new hair growth since starting spironolactone.  The spot of alopecia is closing on the anterior frontal area.    Androgen lab work  from 3/11/2022 indicates unremarkable androstenedione, testosterone total, DHEA, hemoglobin A1c.  Hair loss is unlikely to be hormonally occurring.  We recommend that she sees the dermatologist      Current Outpatient Medications on File Prior to Visit   Medication Sig Dispense Refill   • doxycycline (VIBRAMYCIN) 50 MG capsule Take one capsule by mouth daily with food. 30 capsule 3   • pantoprazole (PROTONIX) 40 MG EC tablet Take 1 tablet by mouth Daily. 90 tablet 3   • Pilocarpine HCl (Vuity) 1.25 % Administer 1 drop to both eyes Daily. 2.5 mL 3   • valsartan (DIOVAN) 160 MG tablet Take 1 tablet by mouth Daily. 90 tablet 0   • [DISCONTINUED] amphetamine-dextroamphetamine (ADDERALL) 15 MG tablet Take 1 tablet by mouth 2 (two) times daily.  Max Daily Amount: 2 tablets 60 tablet 0   • [DISCONTINUED] spironolactone (ALDACTONE) 25 MG tablet Take 2 tablets by mouth 2 (Two) Times a Day. Start as 25 mg daily and increase by 25 mg every 14 days as tolerated . 120 tablet 2   • [DISCONTINUED] amphetamine-dextroamphetamine (ADDERALL) 15 MG tablet Take 1 tablet by mouth 2 (two) times daily 60 tablet 0   • [DISCONTINUED] doxycycline (VIBRAMYCIN) 100 MG capsule Take one capsule by mouth twice daily with food 60 capsule 1     No current facility-administered medications on file prior to visit.     Past Medical History:   Diagnosis Date   • Attention deficit disorder    • Dyslipidemia    • GERD (gastroesophageal reflux disease)    • Heavy periods    • High blood pressure    • Hypertension    • Menses painful    • PONV (postoperative nausea and vomiting)      Past Surgical History:   Procedure Laterality Date   • [facility]    • COLONOSCOPY N/A 10/30/2018    Two 3 to 4 mm polyps in the sigmoid colon, [facility]. PATH: Hyperplastic polyp   • TOTAL LAPAROSCOPIC HYSTERECTOMY N/A 6/22/2021    Procedure: TOTAL LAPAROSCOPIC HYSTERECTOMY BILATERAL SALPINGECTOMY;  Surgeon: Minerva Baer MD;  Location: [facility]  "MAIN OR;  Service: Gynecology;  Laterality: N/A;   • WISDOM TOOTH EXTRACTION         Objective   Vital Signs:   /72   Pulse 89   Temp 96.9 °F (36.1 °C)   Ht 172.7 cm (67.99\")   Wt 86.5 kg (190 lb 9.6 oz)   SpO2 98%   BMI 28.99 kg/m²     Physical Exam  Vitals and nursing note reviewed.   HENT:      Head: Normocephalic.      Mouth/Throat:      Mouth: Mucous membranes are moist.   Cardiovascular:      Rate and Rhythm: Normal rate.   Musculoskeletal:         General: No swelling. Normal range of motion.      Cervical back: Normal range of motion and neck supple.   Skin:     General: Skin is warm and dry.      Comments: Scalp shows no areas of severe alopecia.  There is thinning of hair anterior frontal central with clear visibility of scalp.  Since last visit there has been increase of growth in this area with very fine small hairs.  It has filled in a bit.   Neurological:      Mental Status: She is alert and oriented to person, place, and time. Mental status is at baseline.   Psychiatric:         Mood and Affect: Mood normal.         Behavior: Behavior normal.         Judgment: Judgment normal.        Result Review :   The following data was reviewed by: Becka Tobias MD on 06/09/2022 :         Narrative & Impression   NUCLEAR MEDICINE THYROID UPTAKE AND SCAN     HISTORY: Abnormal thyroid function test. Suspected toxic nodule.     TECHNIQUE: The patient ingested 236 uCi of I-123 and at 24 hours an  uptake measurement was made and three images of the gland were acquired  which are correlated with a thyroid sonogram from June 2019. There is no  more recent thyroid imaging for correlation.     FINDINGS: 24-hour uptake is normal at 21%. Normal range is 8-35%.     Images of the gland show heterogeneous activity bilaterally with  diminished activity around the left mid and upper pole and the right  lower pole. Relatively increased uptake is observed around the anterior  medial right midpole.   "   IMPRESSION:  1. Normal 24-hour radioiodine uptake.  2. Heterogeneity of the gland consistent with a multinodular gland.  There is relatively increased uptake in the anterior and medial right  mid thyroid lobe. This may be a hyperfunctioning nodule. Correlation  with more recent anatomic imaging suggested.     This report was finalized on 3/25/2022 6:24 PM by Dr. Rasta Linn M.D.        US THYROID-     CLINICAL: Hyperthyroidism, suspected goiter.     COMPARISON: Made to previous ultrasound examinations dating back to  03/14/2019, also correlation with thyroid uptake and scan from  03/24/2022.     FINDINGS: The right lobe measures 5.2 x 2.0 x 1.7 cm, the left lobe  measures 5.2 x 2.2 x 1.4 cm and the isthmus 5 mm.     Located within the midpole of the right thyroid gland there are 3  [id number] nodules. The echotexture is between spongiform and  hypoechoic solid with the largest of these measuring 16 mm in maximum  diameter. These are quite similar to the previous 2019 examination. At  best, ACR TR Category 4. The largest is located near the isthmus and  likely corresponds to the nodule having increased activity on thyroid  scan.     Located within the isthmus, at the site of a large cyst seen in 2019, is  a isoechoic solid nodule containing a small cystic area peripherally  measuring 12 x 8 x 10 mm. ACR TR category 3. This does not qualify for  fine-needle aspiration and is similar to the previous examination.     Within the inferior pole of the left thyroid there is a predominantly  solid, slightly hypoechoic nodule containing microcalcifications, ACR TR  Category 5. It measures 19 x 11 x 14 mm. It measured 19 x 12 x 13 mm on  the 2019 examination. While this does qualify for fine-needle  aspiration, stability in size over the past 3 years supports a benign  etiology. This corresponds to subtle increase activity seen on previous  thyroid scan.     CONCLUSION: Multinodular thyroid as described above. It has  remained  stable since 2019 with the exception of interval resolution of the large  isthmus cyst. Nodule within the medial mid right thyroid likely  corresponds to the focus of moderate increased activity seen on  03/24/2022 thyroid scan. Nodule within the inferior pole of the left  gland corresponds to subtle increased activity on thyroid scan.   --------------------------------------------------------------------------------------------  This report was finalized on 3/28/2022 9:26 AM by Dr. Niraj Turner M.D.   ------------------------------------------------------------------------------  Component   Ref Range & Units 3 mo ago 03/11/22 15:19 3 yr ago   Prolactin   4.8 - 23.3 ng/mL 23.7 High   8.84 R    Resulting Agency [facility]             Narrative  Performed by: [facility]          Ref Range & Units 3 mo ago 3 yr ago   Thyroid Peroxidase Antibody   0 - 34 IU/mL <8      Thyroglobulin Ab   0.0 - 0.9 IU/mL <1.0     <1.0  CM   Comment: Thyroglobulin Antibody measured by [facility]   Resulting Agency [facility]     Ref Range & Units 3 mo ago 3 yr ago   Thyroid Peroxidase Antibody   0 - 34 IU/mL <8      Thyroglobulin Ab   0.0 - 0.9 IU/mL <1.0     <1.0  CM   Comment: Thyroglobulin Antibody measured by [facility]   Resulting Agency [facility]         Component      Latest Ref Rng & Units 6/6/2022 6/6/2022          10:44 AM 10:44 AM   Glucose      65 - 99 mg/dL 88    BUN      6 - 20 mg/dL 6    Creatinine      0.57 - 1.00 mg/dL 0.70    Sodium      136 - 145 mmol/L 137    Potassium      3.5 - 5.2 mmol/L 4.1    Chloride      98 - 107 mmol/L 103    CO2      22.0 - 29.0 mmol/L 22.7    Calcium      8.6 - 10.5 mg/dL 9.5    BUN/Creatinine Ratio      7.0 - 25.0 8.6    Anion Gap      5.0 - 15.0 mmol/L 11.3    eGFR      >60.0 mL/min/1.73 106.2    TSH Baseline      0.270 - 4.200 uIU/mL  0.380     Component      Latest Ref Rng & Units 6/6/2022 6/6/2022          10:44 AM 10:44 AM    T3, Free      2.00 - 4.40 pg/mL 3.40    FSH      mIU/mL  2.53     Component      Latest Ref Rng & Units 6/6/2022          10:44 AM   Estradiol      pg/mL 284.0     Adult Femles:       Follicular phase          12.5-166.0 pg/mL       Ovulation phase           85.8-498.0 pg/mL       Luteal phase              43.8-211.0 pg/mL       Postmenopausal            <6.0-54.7  pg/mL     Reviewed 3/28/22:    Component   Ref Range & Units 1 mo ago 2 yr ago 3 yr ago 4 yr ago   TSH   0.27 - 4.20 u(iU)/mL 0.087 Low   0.943 R  0.745  0.687 R    Resulting Agency [facility]              Specimen Collected: 03/01/22 14:11 Last Resulted: 03/01/22 21:14   Received From: [facility]  Result Received: 03/10/22 15:31               Component      Latest Ref Rng & Units 3/11/2022 3/11/2022 3/11/2022 3/11/2022           3:19 PM  3:19 PM  3:19 PM  3:19 PM   Hemoglobin A1C      4.8 - 5.6 % 5.3      DHEA-Sulfate      41.2 - 243.7 ug/dL  130.0     Androstenedione      41 - 262 ng/dL   52      Component      Latest Ref Rng & Units 3/11/2022 3/11/2022 3/11/2022 3/11/2022           3:19 PM  3:19 PM  3:19 PM  3:19 PM   Thyroid Peroxidase Antibody      0 - 34 IU/mL    <8   Thyroglobulin Ab      0.0 - 0.9 IU/mL    <1.0   Prolactin      4.8 - 23.3 ng/mL 23.7 (H)      Thyrotropin Receptor Antibody      0.00 - 1.75 IU/L  <1.10     Thyroid Stimulating Immunoglobulin      0.00 - 0.55 IU/L   <0.10      Component      Latest Ref Rng & Units 3/11/2022           3:19 PM   T3, Free      2.0 - 4.4 pg/mL 3.4        Assessment and Plan   5/26/22 on or after lab draw.   Return visit 6 weeks     46-year-old  female with subclinicl hyperthyroid. Current imaging indicates toxic bilateral multinodular goiter. history of total hysterectomy with ovaries remaining suspect menopause associated hair loss, non-scarring.   Labs ordered.     For non-scarring hair loss. No clear excess  androgen.  likely  subclinical hyperthyroid. Autoimmune thyroiditis likely [TPO ab and Anty thyroglobulin negative. ]  Trial of spironolactone 25 mg increase every 2 weeks is being tolerated currently on spironolactone 75 mg daily.  Increased to spironolactone 50 mg twice daily.  BMP 1 week prior to visit in 1 month.      Subclinical hyperthyroid due to toxic nodules over producing thyroid hormone on both right and left lobe consistent with the nodules on the ultrasound. No biopsy recommended.  At this time lab work of thyroid is unremarkable.  No medication is recommended.  She will need monitoring surveillance with TSH every 3 months the first year and then every 6 months.      Diagnoses and all orders for this visit:    1. Prolactin increased (Primary)  -     Prolactin; Future  -     TSH; Future    2. Toxic multinodul goiter  -     Prolactin; Future    3. Non-scarring hair loss  -     Basic Metabolic Panel; Future    4. Subclinical hyperthyroidism  -     spironolactone (ALDACTONE) 50 MG tablet; Take 1 tablet by mouth 2 (Two) Times a Day.  Dispense: 60 tablet; Refill: 1    5. Hair loss  -     spironolactone (ALDACTONE) 50 MG tablet; Take 1 tablet by mouth 2 (Two) Times a Day.  Dispense: 60 tablet; Refill: 1    6. Perimenopausal  -     spironolactone (ALDACTONE) 50 MG tablet; Take 1 tablet by mouth 2 (Two) Times a Day.  Dispense: 60 tablet; Refill: 1         I spent 35 minutes caring for Radha on this date of service. This time includes time spent by me in the following activities:reviewing tests, obtaining and/or reviewing a separately obtained history, performing a medically appropriate examination and/or evaluation , counseling and educating the patient/family/caregiver and ordering medications, tests, or procedures Discussed pathophysiology of toxic nodules. subclinica hyperthyroid and hair loss options. A dermatology consult is suggested. Trial of spironolactone may help. Risk and benefits reviewed. She has  taken in past without hyperkalemia occurring.   At this time no antithyroid medication recommended as T3 and T4 are normal.   Follow Up   Return in about 8 weeks (around 8/1/2022) for Recheck. labs in 4 weeks  Patient was given instructions and counseling regarding her condition or for health maintenance advice. Please see specific information pulled into the AVS if appropriate.

## 2022-07-26 ENCOUNTER — LAB (OUTPATIENT)
Dept: LAB | Facility: HOSPITAL | Age: 47
End: 2022-07-26

## 2022-07-26 DIAGNOSIS — L65.9 NON-SCARRING HAIR LOSS: ICD-10-CM

## 2022-07-26 DIAGNOSIS — E05.20 TOXIC MULTINODUL GOITER: ICD-10-CM

## 2022-07-26 DIAGNOSIS — R79.89 PROLACTIN INCREASED: ICD-10-CM

## 2022-07-26 LAB
ANION GAP SERPL CALCULATED.3IONS-SCNC: 10.7 MMOL/L (ref 5–15)
BUN SERPL-MCNC: 13 MG/DL (ref 6–20)
BUN/CREAT SERPL: 19.7 (ref 7–25)
CALCIUM SPEC-SCNC: 9.6 MG/DL (ref 8.6–10.5)
CHLORIDE SERPL-SCNC: 109 MMOL/L (ref 98–107)
CO2 SERPL-SCNC: 21.3 MMOL/L (ref 22–29)
CREAT SERPL-MCNC: 0.66 MG/DL (ref 0.57–1)
EGFRCR SERPLBLD CKD-EPI 2021: 107.7 ML/MIN/1.73
GLUCOSE SERPL-MCNC: 91 MG/DL (ref 65–99)
POTASSIUM SERPL-SCNC: 4.4 MMOL/L (ref 3.5–5.2)
PROLACTIN SERPL-MCNC: 12.5 NG/ML (ref 4.79–23.3)
SODIUM SERPL-SCNC: 141 MMOL/L (ref 136–145)
TSH SERPL DL<=0.05 MIU/L-ACNC: 0.21 UIU/ML (ref 0.27–4.2)

## 2022-07-26 PROCEDURE — 84146 ASSAY OF PROLACTIN: CPT

## 2022-07-26 PROCEDURE — 36415 COLL VENOUS BLD VENIPUNCTURE: CPT

## 2022-07-26 PROCEDURE — 84443 ASSAY THYROID STIM HORMONE: CPT

## 2022-07-26 PROCEDURE — 80048 BASIC METABOLIC PNL TOTAL CA: CPT

## 2022-08-01 ENCOUNTER — OFFICE VISIT (OUTPATIENT)
Dept: ENDOCRINOLOGY | Age: 47
End: 2022-08-01

## 2022-08-01 ENCOUNTER — TRANSCRIBE ORDERS (OUTPATIENT)
Dept: LAB | Facility: HOSPITAL | Age: 47
End: 2022-08-01

## 2022-08-01 ENCOUNTER — LAB (OUTPATIENT)
Dept: LAB | Facility: HOSPITAL | Age: 47
End: 2022-08-01

## 2022-08-01 VITALS
OXYGEN SATURATION: 98 % | WEIGHT: 189 LBS | HEIGHT: 68 IN | SYSTOLIC BLOOD PRESSURE: 118 MMHG | BODY MASS INDEX: 28.64 KG/M2 | HEART RATE: 93 BPM | TEMPERATURE: 97.7 F | DIASTOLIC BLOOD PRESSURE: 70 MMHG

## 2022-08-01 DIAGNOSIS — L65.9 NON-SCARRING HAIR LOSS: ICD-10-CM

## 2022-08-01 DIAGNOSIS — I10 ESSENTIAL HYPERTENSION, MALIGNANT: Primary | ICD-10-CM

## 2022-08-01 DIAGNOSIS — E05.20 TOXIC MULTINODUL GOITER: ICD-10-CM

## 2022-08-01 DIAGNOSIS — I10 ESSENTIAL HYPERTENSION, MALIGNANT: ICD-10-CM

## 2022-08-01 DIAGNOSIS — E05.90 SUBCLINICAL HYPERTHYROIDISM: Primary | ICD-10-CM

## 2022-08-01 LAB
ALBUMIN SERPL-MCNC: 4.9 G/DL (ref 3.5–5.2)
ALBUMIN/GLOB SERPL: 1.6 G/DL
ALP SERPL-CCNC: 106 U/L (ref 39–117)
ALT SERPL W P-5'-P-CCNC: 8 U/L (ref 1–33)
ANION GAP SERPL CALCULATED.3IONS-SCNC: 12.3 MMOL/L (ref 5–15)
AST SERPL-CCNC: 13 U/L (ref 1–32)
BASOPHILS # BLD AUTO: 0.02 10*3/MM3 (ref 0–0.2)
BASOPHILS NFR BLD AUTO: 0.2 % (ref 0–1.5)
BILIRUB SERPL-MCNC: 0.5 MG/DL (ref 0–1.2)
BUN SERPL-MCNC: 9 MG/DL (ref 6–20)
BUN/CREAT SERPL: 10.7 (ref 7–25)
CALCIUM SPEC-SCNC: 10.2 MG/DL (ref 8.6–10.5)
CHLORIDE SERPL-SCNC: 104 MMOL/L (ref 98–107)
CO2 SERPL-SCNC: 21.7 MMOL/L (ref 22–29)
CREAT SERPL-MCNC: 0.84 MG/DL (ref 0.57–1)
DEPRECATED RDW RBC AUTO: 39.8 FL (ref 37–54)
EGFRCR SERPLBLD CKD-EPI 2021: 85.3 ML/MIN/1.73
EOSINOPHIL # BLD AUTO: 0.08 10*3/MM3 (ref 0–0.4)
EOSINOPHIL NFR BLD AUTO: 0.9 % (ref 0.3–6.2)
ERYTHROCYTE [DISTWIDTH] IN BLOOD BY AUTOMATED COUNT: 12.3 % (ref 12.3–15.4)
GLOBULIN UR ELPH-MCNC: 3 GM/DL
GLUCOSE SERPL-MCNC: 89 MG/DL (ref 65–99)
HCT VFR BLD AUTO: 41.6 % (ref 34–46.6)
HGB BLD-MCNC: 13.6 G/DL (ref 12–15.9)
IMM GRANULOCYTES # BLD AUTO: 0.03 10*3/MM3 (ref 0–0.05)
IMM GRANULOCYTES NFR BLD AUTO: 0.3 % (ref 0–0.5)
LYMPHOCYTES # BLD AUTO: 2.24 10*3/MM3 (ref 0.7–3.1)
LYMPHOCYTES NFR BLD AUTO: 24 % (ref 19.6–45.3)
MCH RBC QN AUTO: 28.8 PG (ref 26.6–33)
MCHC RBC AUTO-ENTMCNC: 32.7 G/DL (ref 31.5–35.7)
MCV RBC AUTO: 87.9 FL (ref 79–97)
MONOCYTES # BLD AUTO: 0.37 10*3/MM3 (ref 0.1–0.9)
MONOCYTES NFR BLD AUTO: 4 % (ref 5–12)
NEUTROPHILS NFR BLD AUTO: 6.6 10*3/MM3 (ref 1.7–7)
NEUTROPHILS NFR BLD AUTO: 70.6 % (ref 42.7–76)
NRBC BLD AUTO-RTO: 0.1 /100 WBC (ref 0–0.2)
PLATELET # BLD AUTO: 438 10*3/MM3 (ref 140–450)
PMV BLD AUTO: 9 FL (ref 6–12)
POTASSIUM SERPL-SCNC: 4.5 MMOL/L (ref 3.5–5.2)
PROT SERPL-MCNC: 7.9 G/DL (ref 6–8.5)
RBC # BLD AUTO: 4.73 10*6/MM3 (ref 3.77–5.28)
SODIUM SERPL-SCNC: 138 MMOL/L (ref 136–145)
WBC NRBC COR # BLD: 9.34 10*3/MM3 (ref 3.4–10.8)

## 2022-08-01 PROCEDURE — 85025 COMPLETE CBC W/AUTO DIFF WBC: CPT | Performed by: INTERNAL MEDICINE

## 2022-08-01 PROCEDURE — 99213 OFFICE O/P EST LOW 20 MIN: CPT | Performed by: INTERNAL MEDICINE

## 2022-08-01 PROCEDURE — 36415 COLL VENOUS BLD VENIPUNCTURE: CPT

## 2022-08-01 PROCEDURE — 80053 COMPREHEN METABOLIC PANEL: CPT

## 2022-08-01 RX ORDER — ISOTRETINOIN 30 MG/1
30 CAPSULE ORAL DAILY
COMMUNITY
Start: 2022-06-15

## 2022-08-01 NOTE — PROGRESS NOTES
Chief Complaint  Hyperthyroidism    Subjective      Radha Robles presents to [facility] ENDOCRINOLOGY  History of Present Illness  46-year-old  female with history of Hashimoto's thyroiditis and hypothyroid in 2019. She presented with low TSH in 2022. Workup is positive for subclinical hyperthyroidism due to bilateral toxic nodules and nonscarring hair loss.    1.  Subclinical hyperthyroidism  Current symptoms: No tremor, no weight loss, no change in energy level.     2. Toxic nodular goiter: no dysphagia or dysphonia.     3.  Non-scarring hair loss in pattern of androgen alopecia. Despite labwork-up negative for excess androgens a trial of anti androgens started . Currently on 150 mg daily of Spironolactone as patient has been following orders to increase dose by 25 mg every few weeks to reach final dose of 200 mg daily. No polyurea. No lighheadedness.    Current Outpatient Medications on File Prior to Visit   Medication Sig Dispense Refill   • amphetamine-dextroamphetamine (ADDERALL) 15 MG tablet Take 1 tablet by mouth 2 (two) times daily.  Max Daily Amount: 2 tablets 60 tablet 0   • ISOtretinoin (ACCUTANE) 30 MG capsule Take 30 mg by mouth Daily.     • pantoprazole (PROTONIX) 40 MG EC tablet Take 1 tablet by mouth Daily. 90 tablet 3   • spironolactone (ALDACTONE) 50 MG tablet Take 1 tablet by mouth 2 (Two) Times a Day. 60 tablet 1   • [DISCONTINUED] valsartan (DIOVAN) 160 MG tablet Take 1 tablet by mouth Daily. 90 tablet 0     No current facility-administered medications on file prior to visit.     Past Medical History:   Diagnosis Date   • Attention deficit disorder    • Dyslipidemia    • GERD (gastroesophageal reflux disease)    • Heavy periods    • High blood pressure    • Hypertension    • Menses painful     S/P laparoscopic hysterectomy , 6/22/21   • Nonscarring hair loss     subclinical hyperthyroidism   • PONV (postoperative nausea and vomiting)    • Toxic nodular goiter  "w/o crisis     Nodules noted in 2018,Ultrasound thyroid and nuclear medicine scan, 3/2022 confirms subclinical hyperthyroidism origin.  Negative TSI, TBG antibody, TPO antibodies.   • Vitamin D deficiency      Objective   Vital Signs:  /70   Pulse 93   Temp 97.7 °F (36.5 °C)   Ht 172.7 cm (67.99\")   Wt 85.7 kg (189 lb)   SpO2 98%   BMI 28.74 kg/m²   Estimated body mass index is 28.74 kg/m² as calculated from the following:    Height as of this encounter: 172.7 cm (67.99\").    Weight as of this encounter: 85.7 kg (189 lb).      Physical Exam  Eyes:      Extraocular Movements: Extraocular movements intact.      Comments: Graves eye signs: no lid lag, no stare.    Neck:      Comments: Thyroid is fibrous , enlarged, no thrill or bruit.   Skin:     Comments: Non scarring hair loss along the anterior scalp. Since last visit 2 months ago visit there is regrowth filling in the anterior scalp midline.   No acne.  No rubor ,face.      Neurological:      Deep Tendon Reflexes: Reflexes are normal and symmetric.        Result Review :The following data was reviewed by: Becka Tobias MD on 08/01/2022:         Component      Latest Ref Rng & Units 6/6/2022 6/6/2022 6/6/2022 6/6/2022          10:44 AM 10:44 AM 10:44 AM 10:44 AM   Glucose      65 - 99 mg/dL 88      BUN      6 - 20 mg/dL 6      Creatinine      0.57 - 1.00 mg/dL 0.70      Sodium      136 - 145 mmol/L 137      Potassium      3.5 - 5.2 mmol/L 4.1      Chloride      98 - 107 mmol/L 103      CO2      22.0 - 29.0 mmol/L 22.7      Calcium      8.6 - 10.5 mg/dL 9.5      BUN/Creatinine Ratio      7.0 - 25.0 8.6      Anion Gap      5.0 - 15.0 mmol/L 11.3      eGFR      >60.0 mL/min/1.73 106.2      Free T4      0.93 - 1.70 ng/dL    1.05   TSH Baseline      0.270 - 4.200 uIU/mL  0.380     Estradiol      pg/mL   284.0      Component      Latest Ref Rng & Units 6/6/20222022 6/6/2022 7/26/2022 7/26/2022          10:44 AM 10:44 AM  6:10 AM  6:10 AM   Glucose      " 65 - 99 mg/dL   91    BUN      6 - 20 mg/dL   13    Creatinine      0.57 - 1.00 mg/dL   0.66    Sodium      136 - 145 mmol/L   141    Potassium      3.5 - 5.2 mmol/L   4.4    Chloride      98 - 107 mmol/L   109 (H)    CO2      22.0 - 29.0 mmol/L   21.3 (L)    Calcium      8.6 - 10.5 mg/dL   9.6    BUN/Creatinine Ratio      7.0 - 25.0   19.7    Anion Gap      5.0 - 15.0 mmol/L   10.7    eGFR      >60.0 mL/min/1.73   107.7    T3, Free      2.00 - 4.40 pg/mL 3.40      FSH      mIU/mL  2.53     Prolactin      4.79 - 23.30 ng/mL    12.50     Component      Latest Ref Rng & Units 7/26/2022           6:10 AM   TSH Baseline      0.270 - 4.200 uIU/mL 0.214 (L)       NUCLEAR MEDICINE THYROID UPTAKE AND SCAN  IMPRESSION:  1. Normal 24-hour radioiodine uptake.  2. Heterogeneity of the gland consistent with a multinodular gland.  There is relatively increased uptake in the anterior and medial right  mid thyroid lobe. This may be a hyperfunctioning nodule. Correlation  with more recent anatomic imaging suggested.     This report was finalized on 3/25/2022 6:24 PM by Dr. Rasta Linn M.D.  ------------------------------------------------------  US THYROID-    CONCLUSION: Multinodular thyroid as described above. It has remained  stable since 2019 with the exception of interval resolution of the large  isthmus cyst. Nodule within the medial mid right thyroid likely  corresponds to the focus of moderate increased activity seen on  03/24/2022 thyroid scan. Nodule within the inferior pole of the left  gland corresponds to subtle increased activity on thyroid scan.     This report was finalized on 3/28/2022 9:26 AM by Dr. Niraj Turner M.D.    Assessment and Plan       Diagnoses and all orders for this visit:    1. Subclinical hyperthyroidism (Primary)  Assessment & Plan:   history of Hashimoto's thyroiditis and hypothyroid in 2019. She presented with low TSH in 2022. Workup is positive for subclinical hyperthyroidism due to  bilateral toxic nodules and nonscarring hair loss.    Monitoring indicates Normal T 3 and Normal T 4 with mild suppressed TSH.   Recommend monitor with TSH, Free T4 and Free T3 in 6 months. Sooner if she developed hyperthyroid symptoms. .       2. Toxic multinodul goiter    3. Non-scarring hair loss  Assessment & Plan:  Non-scarring hair loss without elevated androgens, with ovulation occurring by labwork. Its possible ovulation is getting less frequently and intermittent hyperandrogen state may occur. Hyperthyroid subclinical may also contribute to hair loss.     Trial of anti-androgen , spironolactone  Has improved the hair loss anterior area. Suggesting there has been intermittent androgen exposure as above.     She has seen dermatology and will continue increasing spironolactone to 200 mg daily for maximum antiandrogen effect.           Follow Up Return in 6 months (on 2/1/2023) for Recheck.  Patient was given instructions and counseling regarding her condition or for health maintenance advice. Please see specific information pulled into the AVS if appropriate.

## 2022-08-02 PROBLEM — N94.6 MENSES PAINFUL: Status: ACTIVE | Noted: 2022-08-02

## 2022-08-02 NOTE — ASSESSMENT & PLAN NOTE
Non-scarring hair loss without elevated androgens, with ovulation occurring by labwork. Its possible ovulation is getting less frequently and intermittent hyperandrogen state may occur. Hyperthyroid subclinical may also contribute to hair loss.     Trial of anti-androgen , spironolactone  Has improved the hair loss anterior area. Suggesting there has been intermittent androgen exposure as above.     She has seen dermatology and will continue increasing spironolactone to 200 mg daily for maximum antiandrogen effect.

## 2022-08-02 NOTE — ASSESSMENT & PLAN NOTE
history of Hashimoto's thyroiditis and hypothyroid in 2019. She presented with low TSH in 2022. Workup is positive for subclinical hyperthyroidism due to bilateral toxic nodules and nonscarring hair loss.    Monitoring indicates Normal T 3 and Normal T 4 with mild suppressed TSH.   Recommend monitor with TSH, Free T4 and Free T3 in 6 months. Sooner if she developed hyperthyroid symptoms. .

## 2022-08-27 DIAGNOSIS — N95.1 PERIMENOPAUSAL: ICD-10-CM

## 2022-08-27 DIAGNOSIS — E05.90 SUBCLINICAL HYPERTHYROIDISM: ICD-10-CM

## 2022-08-27 DIAGNOSIS — L65.9 HAIR LOSS: ICD-10-CM

## 2022-08-29 DIAGNOSIS — N95.1 PERIMENOPAUSAL: ICD-10-CM

## 2022-08-29 DIAGNOSIS — E05.90 SUBCLINICAL HYPERTHYROIDISM: ICD-10-CM

## 2022-08-29 DIAGNOSIS — L65.9 HAIR LOSS: ICD-10-CM

## 2022-08-30 RX ORDER — SPIRONOLACTONE 50 MG/1
50 TABLET, FILM COATED ORAL 2 TIMES DAILY
Qty: 60 TABLET | Refills: 1 | OUTPATIENT
Start: 2022-08-30

## 2022-08-30 RX ORDER — SPIRONOLACTONE 50 MG/1
50 TABLET, FILM COATED ORAL 2 TIMES DAILY
Qty: 60 TABLET | Refills: 1 | Status: SHIPPED | OUTPATIENT
Start: 2022-08-30

## 2022-10-10 ENCOUNTER — APPOINTMENT (OUTPATIENT)
Dept: WOMENS IMAGING | Facility: HOSPITAL | Age: 47
End: 2022-10-10

## 2022-10-10 PROCEDURE — 77067 SCR MAMMO BI INCL CAD: CPT | Performed by: RADIOLOGY

## 2022-10-10 PROCEDURE — 77063 BREAST TOMOSYNTHESIS BI: CPT | Performed by: RADIOLOGY

## 2022-10-24 ENCOUNTER — APPOINTMENT (OUTPATIENT)
Dept: WOMENS IMAGING | Facility: HOSPITAL | Age: 47
End: 2022-10-24

## 2022-10-24 PROCEDURE — 76642 ULTRASOUND BREAST LIMITED: CPT | Performed by: RADIOLOGY

## 2022-10-24 PROCEDURE — 77061 BREAST TOMOSYNTHESIS UNI: CPT | Performed by: RADIOLOGY

## 2022-10-24 PROCEDURE — 77065 DX MAMMO INCL CAD UNI: CPT | Performed by: RADIOLOGY

## 2022-10-24 PROCEDURE — G0279 TOMOSYNTHESIS, MAMMO: HCPCS | Performed by: RADIOLOGY

## 2022-11-01 ENCOUNTER — TELEPHONE (OUTPATIENT)
Dept: OBSTETRICS AND GYNECOLOGY | Age: 47
End: 2022-11-01

## 2022-11-01 NOTE — TELEPHONE ENCOUNTER
----- Message from IBAN Verma sent at 10/27/2022  9:01 AM EDT -----  I LMTC.  I can't tell if anyone at Dr Baer's office has contacted her or made arrangements for the biopsy. I have asked her to CB and ask for you. Lmk if I need to place orders for this. thanks

## 2022-11-16 ENCOUNTER — APPOINTMENT (OUTPATIENT)
Dept: WOMENS IMAGING | Facility: HOSPITAL | Age: 47
End: 2022-11-16

## 2022-11-16 PROCEDURE — A4648 IMPLANTABLE TISSUE MARKER: HCPCS | Performed by: RADIOLOGY

## 2022-11-16 PROCEDURE — 19081 BX BREAST 1ST LESION STRTCTC: CPT | Performed by: RADIOLOGY

## 2023-02-23 ENCOUNTER — TRANSCRIBE ORDERS (OUTPATIENT)
Dept: LAB | Facility: HOSPITAL | Age: 48
End: 2023-02-23
Payer: COMMERCIAL

## 2023-02-23 ENCOUNTER — LAB (OUTPATIENT)
Dept: LAB | Facility: HOSPITAL | Age: 48
End: 2023-02-23
Payer: COMMERCIAL

## 2023-02-23 DIAGNOSIS — F98.8 ATTENTION DEFICIT DISORDER OF ADULT: Primary | ICD-10-CM

## 2023-02-23 DIAGNOSIS — F98.8 ATTENTION DEFICIT DISORDER OF ADULT: ICD-10-CM

## 2023-02-23 DIAGNOSIS — I10 ESSENTIAL HYPERTENSION, MALIGNANT: ICD-10-CM

## 2023-02-23 LAB
ALBUMIN SERPL-MCNC: 4.6 G/DL (ref 3.5–5.2)
ALBUMIN/GLOB SERPL: 1.7 G/DL
ALP SERPL-CCNC: 75 U/L (ref 39–117)
ALT SERPL W P-5'-P-CCNC: 15 U/L (ref 1–33)
ANION GAP SERPL CALCULATED.3IONS-SCNC: 12.3 MMOL/L (ref 5–15)
AST SERPL-CCNC: 14 U/L (ref 1–32)
BILIRUB SERPL-MCNC: 0.6 MG/DL (ref 0–1.2)
BUN SERPL-MCNC: 11 MG/DL (ref 6–20)
BUN/CREAT SERPL: 13.3 (ref 7–25)
CALCIUM SPEC-SCNC: 10.4 MG/DL (ref 8.6–10.5)
CHLORIDE SERPL-SCNC: 99 MMOL/L (ref 98–107)
CO2 SERPL-SCNC: 26.7 MMOL/L (ref 22–29)
CREAT SERPL-MCNC: 0.83 MG/DL (ref 0.57–1)
DEPRECATED RDW RBC AUTO: 42 FL (ref 37–54)
EGFRCR SERPLBLD CKD-EPI 2021: 86.5 ML/MIN/1.73
ERYTHROCYTE [DISTWIDTH] IN BLOOD BY AUTOMATED COUNT: 13.1 % (ref 12.3–15.4)
GLOBULIN UR ELPH-MCNC: 2.7 GM/DL
GLUCOSE SERPL-MCNC: 89 MG/DL (ref 65–99)
HCT VFR BLD AUTO: 37.8 % (ref 34–46.6)
HGB BLD-MCNC: 12.6 G/DL (ref 12–15.9)
MCH RBC QN AUTO: 29.3 PG (ref 26.6–33)
MCHC RBC AUTO-ENTMCNC: 33.3 G/DL (ref 31.5–35.7)
MCV RBC AUTO: 87.9 FL (ref 79–97)
PLATELET # BLD AUTO: 361 10*3/MM3 (ref 140–450)
PMV BLD AUTO: 8.7 FL (ref 6–12)
POTASSIUM SERPL-SCNC: 4.1 MMOL/L (ref 3.5–5.2)
PROT SERPL-MCNC: 7.3 G/DL (ref 6–8.5)
RBC # BLD AUTO: 4.3 10*6/MM3 (ref 3.77–5.28)
SODIUM SERPL-SCNC: 138 MMOL/L (ref 136–145)
WBC NRBC COR # BLD: 8.34 10*3/MM3 (ref 3.4–10.8)

## 2023-02-23 PROCEDURE — 36415 COLL VENOUS BLD VENIPUNCTURE: CPT

## 2023-02-23 PROCEDURE — 85027 COMPLETE CBC AUTOMATED: CPT

## 2023-02-23 PROCEDURE — 80053 COMPREHEN METABOLIC PANEL: CPT | Performed by: INTERNAL MEDICINE

## 2023-03-13 ENCOUNTER — TELEPHONE (OUTPATIENT)
Dept: GASTROENTEROLOGY | Facility: CLINIC | Age: 48
End: 2023-03-13

## 2023-03-13 NOTE — TELEPHONE ENCOUNTER
Provider: ARMANDO HUBBARD    Caller: LUH RIVAS    Relationship to Patient: SELF    Phone Number: [phone]    Reason for Call: PT CALLED TO SCHEDULE COLONOSCOPY THOUGHT IT WAS TO BE EVERY 5 YEARS SINCE POLYPS WHERE FOUND. RECALL SHOWS 2028.  PLEASE CONFIRM THIS TIME SPAN AND CONTACT PATIENT.

## 2023-03-17 ENCOUNTER — PRE-PROCEDURE SCREENING (OUTPATIENT)
Dept: GASTROENTEROLOGY | Facility: CLINIC | Age: 48
End: 2023-03-17
Payer: COMMERCIAL

## 2023-03-17 NOTE — TELEPHONE ENCOUNTER
Left vmail to call back 3/17  SEE MESSAGE           Mailed OA questionnaire to patient to complete for screening. Will defer for 30days and notify referring physician if not received

## 2023-04-16 DIAGNOSIS — K21.9 GASTROESOPHAGEAL REFLUX DISEASE: ICD-10-CM

## 2023-04-17 RX ORDER — PANTOPRAZOLE SODIUM 40 MG/1
40 TABLET, DELAYED RELEASE ORAL DAILY
Qty: 90 TABLET | Refills: 3 | OUTPATIENT
Start: 2023-04-17

## 2023-04-24 ENCOUNTER — TELEPHONE (OUTPATIENT)
Dept: GASTROENTEROLOGY | Facility: CLINIC | Age: 48
End: 2023-04-24
Payer: COMMERCIAL

## 2023-04-24 NOTE — TELEPHONE ENCOUNTER
Caller: Radha Robles    Relationship: Self    Best call back number: [phone]    Requested Prescriptions:   Requested Prescriptions      No prescriptions requested or ordered in this encounter        Pharmacy where request should be sent:    [facility]      Additional details provided by patient: PATIENT NEEDS REFILL AS SOON AS POSSIBLE FOR HER PROTONIX 40 MG. SHE ONLY HAS 2 PILLS LEFT. SHE DID STATE SHE NEEDED AN APPOINTMENT FOR HER MEDICATION THE NEXT DATE WAS 7-, SHE TOOK THAT APPOINTMENT AND WAS ALSO PLACED ON THE WAITLIST. SHE CAN'T WAIT THAT LONG. SHE CAN BE REACHED ON THE NUMBER ABOVE ANYTIME AND A MESSAGE CAN BE LEFT.    Does the patient have less than a 3 day supply:  [x] Yes  [] No          Robert Holden Rep   04/24/23 11:02 EDT

## 2023-04-25 DIAGNOSIS — K21.9 GASTROESOPHAGEAL REFLUX DISEASE: ICD-10-CM

## 2023-04-25 RX ORDER — PANTOPRAZOLE SODIUM 40 MG/1
40 TABLET, DELAYED RELEASE ORAL DAILY
Qty: 90 TABLET | Refills: 0 | Status: SHIPPED | OUTPATIENT
Start: 2023-04-25

## 2023-05-02 ENCOUNTER — TELEPHONE (OUTPATIENT)
Dept: GASTROENTEROLOGY | Facility: CLINIC | Age: 48
End: 2023-05-02

## 2023-05-02 NOTE — TELEPHONE ENCOUNTER
Caller: Radha Robles    Relationship: Self    Best call back number: [phone]    What is the best time to reach you: ANYTIME    Who are you requesting to speak with (clinical staff, provider,  specific staff member): CURTIS    Do you know the name of the person who called: CURTIS    What was the call regarding: PT RETURNING CALL TO CURTIS IN REGARDS TO RESCHEDULE APPT. ALSO NEEDING NEW SCRIPT FOR PROTONIX AS APPT KEEPS GETTING CANCELED.     Do you require a callback: YES, CALLBACK AFTER 3PM.

## 2023-05-08 ENCOUNTER — APPOINTMENT (OUTPATIENT)
Dept: WOMENS IMAGING | Facility: HOSPITAL | Age: 48
End: 2023-05-08
Payer: COMMERCIAL

## 2023-05-08 PROCEDURE — 77065 DX MAMMO INCL CAD UNI: CPT | Performed by: RADIOLOGY

## 2023-05-08 PROCEDURE — G0279 TOMOSYNTHESIS, MAMMO: HCPCS | Performed by: RADIOLOGY

## 2023-05-08 PROCEDURE — 77061 BREAST TOMOSYNTHESIS UNI: CPT | Performed by: RADIOLOGY

## 2023-05-25 ENCOUNTER — TELEPHONE (OUTPATIENT)
Dept: SURGERY | Facility: CLINIC | Age: 48
End: 2023-05-25
Payer: COMMERCIAL

## 2023-05-25 NOTE — TELEPHONE ENCOUNTER
New patient chart prepared for Dr. Barney to review. Family hx breast ca and hx-biological mother had breast ca,  Hx of breast bx.

## 2023-05-31 ENCOUNTER — TELEPHONE (OUTPATIENT)
Dept: SURGERY | Facility: CLINIC | Age: 48
End: 2023-05-31

## 2023-05-31 NOTE — TELEPHONE ENCOUNTER
Called patient to schedule new patient apt for abnormal breast imaging and family hx breast ca.     I had to leave patient a message for patient to call us back.     Scheduled 6/9/23 8:40, mailed new patient packet.

## 2023-06-20 PROBLEM — Z91.89 AT HIGH RISK FOR BREAST CANCER: Status: ACTIVE | Noted: 2023-06-20

## 2023-06-20 PROBLEM — Z80.3 FAMILY HISTORY OF BREAST CANCER: Status: ACTIVE | Noted: 2023-06-20

## 2023-10-11 ENCOUNTER — HOSPITAL ENCOUNTER (OUTPATIENT)
Dept: MAMMOGRAPHY | Facility: HOSPITAL | Age: 48
Discharge: HOME | End: 2023-10-11
Admitting: NURSE PRACTITIONER
Payer: COMMERCIAL

## 2023-10-11 DIAGNOSIS — Z12.31 ENCOUNTER FOR SCREENING MAMMOGRAM FOR MALIGNANT NEOPLASM OF BREAST: ICD-10-CM

## 2023-10-11 PROCEDURE — 77063 BREAST TOMOSYNTHESIS BI: CPT

## 2023-10-11 PROCEDURE — 77067 SCR MAMMO BI INCL CAD: CPT

## 2023-10-16 ENCOUNTER — TELEPHONE (OUTPATIENT)
Dept: SURGERY | Facility: CLINIC | Age: 48
End: 2023-10-16
Payer: COMMERCIAL

## 2023-10-20 NOTE — PROGRESS NOTES
[facility]     Referring Provider: Self Referring     Chief complaint: abnormal breast imaging     HPI: Ms. Radha Robles is a 49 yo woman, seen at the request of Self Referring, for abnormal breast imaging    I personally reviewed her records and summarized her relevant breast history/imagin2020 bilateral screening mammogram at [facility]  The breasts are heterogeneously dense.  There are bilateral retropectoral saline implants.  There are stable areas of asymmetric breast tissue seen in both breast.  Scattered benign-appearing calcifications are noted.  No suspicious masses suspicious microcalcifications or architectural distortion identified.  Impression  Stable areas of asymmetric breast tissue in both breast are benign negative.  BI-RADS 2    10/10/2022 bilateral screening mammogram at [facility]  The breasts are heterogeneously dense.  There are bilateral retropectoral saline implants.  Finding 1 there is a focal asymmetry measuring 9 mm with associated calcifications seen in the posterior one third region of the right breast at 3:00.  There are no significant changes from the prior exam.  Finding 2 there are round amorphous and punctuate calcifications with scattered distribution seen in both breast.  There is no significant change from prior exam.  The parenchyma include stable nodular asymmetries.  No suspicious masses suspicious microcalcifications or areas of architectural distortion are identified.  Impression  Finding 1 focal asymmetry in the right breast requires additional evaluation.  Diagnostic mammogram with LM and CC implant displaced with more posterior tissue and limited breast ultrasound recommended.  Magnification mammography is recommended.  Finding 2 calcifications in both breast are benign negative.  BI-RADS 0    10/24/2022 right diagnostic mammogram and right limited breast ultrasound at [facility]  The breast is heterogeneously dense.  There are bilateral retropectoral saline  implants.  Additional evaluation was performed for the focal asymmetry in the right breast 3:00 seen on 10/10/2022.  On the present examination there is a focal asymmetry measuring 9 mm with single adjacent round calcification in the posterior one third region of the right breast at 3:00.  There are no suspicious calcifications seen.  There is an area of subtle fibrocystic change with isohypoechoic and anechoic areas.  This is best seen mammographically.  Impression  Focal asymmetry in the right breast is suspicious.  Stereotactic biopsy is recommended.  BI-RADS 4    11/16/2022 stereotactic biopsy of right breast at [facility]  Pathology is benign and concordant.  Pathology returned as fibrocystic changes.  A follow-up mammogram in 6 months is recommended.    5/8/2023 right diagnostic mammogram at [facility]  The breast is heterogeneously dense.  There are bilateral retropectoral saline implants.  There is a stable biopsy clip seen in the right breast.  No suspicious masses suspicious microcalcifications or architectural distortion are identified.  There is been no significant change from prior exam.  Impression  Stable biopsy clip in the right breast is benign negative.  BI-RADS 2      She has a personal history of bilateral retropectoral saline implants in 2004 and a total hysterectomy in 2021    She has a family history of breast cancer in her mother age 68.  She denies any family history of ovarian cancer.     Today she presents with concerns regarding recent biopsy and high risk assessment.  She was adopted but recently found her birth mother who had breast cancer at the age of 68.  She denies any breast lumps, pain, skin changes, or nipple discharge.    10/17/2023 Interval History  Patient presenting to the office today for routine follow-up.  She had a screening mammogram on 10/11 that resulted as BI-RADS 2.  She has no new breast complaints or concerns today.    Review of Systems - Oncology    Medications:    Current  Outpatient Medications:     amphetamine-dextroamphetamine (ADDERALL) 15 MG tablet, Take 1 tablet by mouth 2 (Two) Times a Day., Disp: 60 tablet, Rfl: 0    amphetamine-dextroamphetamine (ADDERALL) 15 MG tablet, Take 1 tablet by mouth 2 (Two) Times a Day., Disp: 60 tablet, Rfl: 0    amphetamine-dextroamphetamine (ADDERALL) 15 MG tablet, Take 1 tablet by mouth 2 (Two) Times a Day., Disp: 60 tablet, Rfl: 0    amphetamine-dextroamphetamine (ADDERALL) 15 MG tablet, Take 1 tablet by mouth 2 (two) times daily.  Max Daily Amount: 2 tablets, Disp: 60 tablet, Rfl: 0    methylPREDNISolone (MEDROL) 4 MG dose pack, follow package directions, Disp: 21 tablet, Rfl: 0    pantoprazole (PROTONIX) 40 MG EC tablet, Take 1 tablet by mouth Daily., Disp: 90 tablet, Rfl: 0    spironolactone (Aldactone) 100 MG tablet, Take 1 tablet by mouth twice daily, Disp: 60 tablet, Rfl: 5    spironolactone (Aldactone) 100 MG tablet, Take 1 tablet by mouth 2 (Two) Times a Day., Disp: 60 tablet, Rfl: 5    valsartan (DIOVAN) 160 MG tablet, Take 1 tablet by mouth daily., Disp: 90 tablet, Rfl: 0    valsartan (DIOVAN) 160 MG tablet, Take 1 tablet by mouth daily., Disp: 90 tablet, Rfl: 0    valsartan (DIOVAN) 160 MG tablet, Take 1 tablet by mouth daily., Disp: 90 tablet, Rfl: 0    Allergies:  No Known Allergies    Medical history:  Past Medical History:   Diagnosis Date    Attention deficit disorder     Dyslipidemia     GERD (gastroesophageal reflux disease)     Heavy periods     High blood pressure     Hypertension     Menses painful     S/P laparoscopic hysterectomy , 6/22/21    Nonscarring hair loss     subclinical hyperthyroidism    Obesity     PONV (postoperative nausea and vomiting)     Seasonal allergies     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  Negative TSI, TBG antibody, TPO antibodies.    Vitamin D deficiency        Surgical  History:  Past Surgical History:   Procedure Laterality Date    BREAST AUGMENTATION      ,  ,[facility]     BREAST BIOPSY  10/16/2022    COLONOSCOPY N/A 10/30/2018    Two 3 to 4 mm polyps in the sigmoid colon, IH. PATH: Hyperplastic polyp    TOTAL LAPAROSCOPIC HYSTERECTOMY N/A 2021    Procedure: TOTAL LAPAROSCOPIC HYSTERECTOMY BILATERAL SALPINGECTOMY;  Surgeon: Minerva Baer MD;  Location: [facility] MAIN OR;  Service: Gynecology;  Laterality: N/A;    WISDOM TOOTH EXTRACTION         Family History:  Family History   Adopted: Yes   Problem Relation Age of Onset    Breast cancer Mother     Alcohol abuse Father     Malig Hyperthermia Neg Hx        Social History:   Social History     Socioeconomic History    Marital status:    Tobacco Use    Smoking status: Never    Smokeless tobacco: Never   Vaping Use    Vaping Use: Never used   Substance and Sexual Activity    Alcohol use: Yes     Comment: Only drink on [employer]    Drug use: No    Sexual activity: Yes     Partners: Male     Birth control/protection: Surgical, None, Hysterectomy     Comment: Hysterectomy 2021     Patient drinks 1-2 servings of caffeine per day.       GYNECOLOGIC HISTORY:   . P: 0. AB: 0.  Last menstrual period: 2021  Age at menarche: 12  Age at first childbirth: N/A  Lactation/How long: N/A  Age at menopause: N/A  Total years of oral contraceptive use: 25 years  Total years of hormone replacement therapy: N/A      Physical Exam  There were no vitals filed for this visit.    ECOG 0 - Asymptomatic  General: NAD, well appearing  Psych: a&o x 3, normal mood and affect  Eyes: EOMI, no scleral icterus  ENMT: neck supple without masses or thyromegaly, mucus membranes moist  Resp: normal effort, CTAB  CV: RRR, no murmurs, no edema   GI: soft, NT, ND  MSK: normal gait, normal ROM in bilateral shoulders  Lymph nodes: no cervical, supraclavicular or axillary lymphadenopathy  Breast: symmetric, large  Right: No visible  abnormalities on inspection while seated, with arms raised or hands on hips. No masses, skin changes, or nipple abnormalities.  Implant intact  Left: No visible abnormalities on inspection while seated, with arms raised or hands on hips. No masses, skin changes, or nipple abnormalities.  Implant intact    Imaging:  [employer] genetic testing  Negative      10/11/2023 bilateral screening mammogram at [facility]  FINDINGS: Bilateral digital CC and MLO mammographic and digital  Tomosynthesis images were obtained. Comparison is made to prior studies  dated 5/8/2023, 11/16/2022 and 10/24/2022. The parenchyma of both  breasts demonstrates a heterogeneously dense pattern which lessens the  sensitivity. I see no new or dominant masses, areas of architectural  distortion or skin thickening. Stable scattered punctate  benign-appearing calcifications are noted in both breast. There is no  evidence for axillary lymphadenopathy or nipple retraction. Intact  bilateral subpectoral saline implants are noted.  IMPRESSION:  1. There is no evidence for malignancy or significant change in either  breast. Routine followup mammography is recommended.  Given the density  of the patient's breast tissue, correlation with screening bilateral  breast sonography and/or bilateral breast MRI may provide additional  benefit and should be considered.  BI-RADS category 2: Benign.     Assessment:    Family history of breast cancer  High risk for breast cancer-according to Shanta she has a 31.1% lifetime risk of breast cancer and a 9.6% 10-year risk    Discussion:  We discussed management options for individuals who are at increased risk (>20% lifetime risk):  1) High risk screening - Annual mammogram and annual breast MRI, alternating one test every 6 months, biannual clinical exam and monthly self breast exam. She meets criteria for high risk screening.  2) Chemoprevention with Tamoxifen, Raloxifene or Exemestane. These may reduce risk up to 50%. I  reviewed that these particular medications are not without risks and the risk/benefit ratio must be considered carefully. She is not interested in this currently.  3) Risk reducing surgery such as prophylactic mastectomy  which may reduce risk by 90-95%. We discussed that this is a relatively radical strategy and is generally reserved for individuals with a known genetic mutation predisposing them to an increased risk (~50% risk) of breast cancer. She does not meet criteria for risk reducing surgery.   4) I discussed the importance of exercise and weight management as part of a risk reducing strategy, since increased BMI is associated with an increased risk of breast cancer. This is especially true in her case, as I expect her risk would decrease as she lost weight.    Risk reducing agents should be recommended when 5 year risk per Carri model is at least 3% or 10 year risk by Tyrer Cuzick model is at least 5%.  Referral to medical oncology made to discuss.      Plan:  1.breast MRI April followed by exam  2.  Monthly self breast examinations  3.  Return to the office if any new issues  4. Pt doesn't want a referral to oncology at this time       HINA Randolph    I have spent 25 mins in face to face time with the patient and in chart review.    CC:  Self Referring  Jaylan Gleason MD    EMR Dragon/transcription disclaimer:  Dictated using Dragon dictation

## 2023-10-26 ENCOUNTER — OFFICE VISIT (OUTPATIENT)
Dept: SURGERY | Facility: CLINIC | Age: 48
End: 2023-10-26
Payer: COMMERCIAL

## 2023-10-26 VITALS
HEIGHT: 68 IN | OXYGEN SATURATION: 98 % | DIASTOLIC BLOOD PRESSURE: 68 MMHG | SYSTOLIC BLOOD PRESSURE: 118 MMHG | HEART RATE: 84 BPM | WEIGHT: 185 LBS | BODY MASS INDEX: 28.04 KG/M2

## 2023-10-26 DIAGNOSIS — Z91.89 AT HIGH RISK FOR BREAST CANCER: Primary | ICD-10-CM

## 2023-10-26 DIAGNOSIS — Z80.3 FAMILY HISTORY OF BREAST CANCER: ICD-10-CM

## 2023-10-26 DIAGNOSIS — F41.9 ANXIETY DUE TO INVASIVE PROCEDURE: ICD-10-CM

## 2023-10-26 DIAGNOSIS — F41.9 ANXIETY: ICD-10-CM

## 2023-10-26 RX ORDER — DIAZEPAM 10 MG/1
10 TABLET ORAL ONCE
Qty: 1 TABLET | Refills: 0 | Status: SHIPPED | OUTPATIENT
Start: 2023-10-26 | End: 2023-10-27

## 2023-12-13 ENCOUNTER — OFFICE VISIT (OUTPATIENT)
Dept: ORTHOPEDIC SURGERY | Facility: CLINIC | Age: 48
End: 2023-12-13
Payer: COMMERCIAL

## 2023-12-13 VITALS — BODY MASS INDEX: 29.43 KG/M2 | TEMPERATURE: 97.8 F | HEIGHT: 68 IN | WEIGHT: 194.2 LBS

## 2023-12-13 DIAGNOSIS — M77.41 METATARSALGIA OF RIGHT FOOT: ICD-10-CM

## 2023-12-13 DIAGNOSIS — R52 PAIN: Primary | ICD-10-CM

## 2023-12-13 DIAGNOSIS — M84.374A STRESS REACTION OF RIGHT FOOT, INITIAL ENCOUNTER: ICD-10-CM

## 2023-12-13 DIAGNOSIS — M19.079 ARTHRITIS OF FOOT: ICD-10-CM

## 2023-12-13 NOTE — PROGRESS NOTES
New Patient Complaint      Patient: Radha Robles  YOB: 1975 48 y.o. female  Medical Record Number: [id number]    Chief Complaints: Foot was hurting    History of Present Illness: Patient reports onset of pain that began in the central forefoot on the right around 10/11/2023.  She did not have any ankle pain.  She did she had quite a bit of discomfort difficulty bearing weight but this has now subsequently improved to the point that she nearly canceled her appointment still with some mild discomfort but overall improved.  She had gone to the emergency room on 10/13/2023 where x-rays were made which did not show any obvious fracture.    She continues to work in radiology triage    HPI    Allergies: No Known Allergies    Medications:   Current Outpatient Medications on File Prior to Visit   Medication Sig    amphetamine-dextroamphetamine (ADDERALL) 15 MG tablet Take 1 tablet by mouth 2 (two) times daily.  Max Daily Amount: 2 tablets    spironolactone (Aldactone) 100 MG tablet Take 1 tablet by mouth 2 (Two) Times a Day.    valsartan (DIOVAN) 160 MG tablet Take 1 tablet by mouth daily.    pantoprazole (PROTONIX) 40 MG EC tablet Take 1 tablet by mouth Daily.     No current facility-administered medications on file prior to visit.       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  Negative TSI, TBG antibody, TPO antibodies.    Vitamin D deficiency      Past Surgical History:   Procedure Laterality Date    BREAST AUGMENTATION      2005,  ,[facility]     BREAST BIOPSY  10/16/2022    COLONOSCOPY N/A 10/30/2018    Two 3 to 4 mm polyps in the sigmoid colon, IH. PATH: Hyperplastic polyp    TOTAL LAPAROSCOPIC HYSTERECTOMY N/A 06/22/2021    Procedure: TOTAL LAPAROSCOPIC HYSTERECTOMY BILATERAL SALPINGECTOMY;  Surgeon: Minerva Baer MD;  Location: [facility] OR;  Service: Gynecology;  Laterality: N/A;    WISDOM TOOTH EXTRACTION       Social History     Occupational History    Not on file   Tobacco Use    Smoking status: Never     Passive exposure: Never    Smokeless tobacco: Never   Vaping Use    Vaping Use: Never used   Substance and Sexual Activity    Alcohol use: Yes     Comment: Only drink on [employer]    Drug use: Never    Sexual activity: Yes     Partners: Male     Birth control/protection: Hysterectomy, Surgical     Comment: Hysterectomy June 2021      Social History     Social History Narrative    Not on file     Family History   Adopted: Yes   Problem Relation Age of Onset    Breast cancer Mother     Alcohol abuse Father     Malig Hyperthermia Neg Hx        Review of Systems: 14 point review of systems performed, positive pertinent findings identified in HPI. All remaining systems negative     Review of Systems      Physical Exam:   Vitals:    12/13/23 0816   Temp: 97.8 °F (36.6 °C)   Weight: 88.1 kg (194 lb 3.2 oz)   Height: 172.7 cm (68\")   PainSc:   4     Physical Exam   Constitutional: pleasant, well developed   Eyes: sclera non icteric  Hearing : adequate for exam  Cardiovascular: palpable pulses in right foot, right calf/ thigh NT without sign of DVT  Respiratoy: breathing unlabored   Neurological: grossly sensate to LT throughout right LE  Psychiatric: oriented with normal mood and affect.   Lymphatic: No palpable " popliteal lymphadenopathy right LE  Skin: intact throughout right leg/foot  Musculoskeletal: Neutral dorsiflexion right heel cord.  She has slight tenderness palpation along the second metatarsal of the forefoot but no exacerbation of pain with second or third MTP motion.  Physical Exam  Ortho Exam    Radiology: 3 views of the right ankle including lateral view of the foot and 2 views of the right foot ordered evaluate pain and alignment reviewed and compared to prior x-rays in the [facility] system from 10/13/2023.  I do not see any interval change compared to previous x-rays.  No obvious fracture or malalignment about the ankle no widening of the syndesmosis.     Foot x-rays do not show any obvious fracture there is a little bit of arthritis at the medial naviculocuneiform joint.  There is some thickening of the cortex/periosteum along the second more so than third metatarsal.  With some questionable area along the second metatarsal neck.    Assessment/Plan: Right forefoot metatarsalgia with possible stress reaction/evolving fracture  2.  Midfoot arthritis    Discussed treatment going forward and discussed MRI which decided to hold off on as symptoms are improving    I recommend heel cord stretching exercises to do at least 4 times a day.  Instruction sheet was provided and demonstrated for the patient. We discussed etiology of heel cord tightness to midfoot and forefoot overload.      I also discussed prescription compounding cream which she wants to hold off on and does have some over-the-counter Voltaren gel she will use    If she has persistent or worsening symptoms she will let me know and we will get MRI of her foot.  Otherwise should continue with sturdy accommodative shoes and we will see her back as needed.

## 2024-03-26 ENCOUNTER — TELEMEDICINE (OUTPATIENT)
Dept: FAMILY MEDICINE CLINIC | Facility: TELEHEALTH | Age: 49
End: 2024-03-26
Payer: COMMERCIAL

## 2024-03-26 VITALS — BODY MASS INDEX: 29.4 KG/M2 | WEIGHT: 194 LBS | HEIGHT: 68 IN

## 2024-03-26 DIAGNOSIS — L03.012 CELLULITIS OF LEFT INDEX FINGER: Primary | ICD-10-CM

## 2024-03-26 RX ORDER — CEPHALEXIN 500 MG/1
500 CAPSULE ORAL 4 TIMES DAILY
Qty: 28 CAPSULE | Refills: 0 | Status: SHIPPED | OUTPATIENT
Start: 2024-03-26 | End: 2024-04-02

## 2024-03-26 NOTE — PROGRESS NOTES
You have chosen to receive care through a telehealth visit.  Do you consent to use a video/audio connection for your medical care today? Yes     HPI  Radha Robles is a 48 y.o. female  presents with complaint of infection of left index finger. She reports that it is causing her pain and that it is beginning to travel down her finger. She also reports that it started out as a hang nail and that she picked it. It started 5 days ago and is red and swollen. The pain is waking her up at night.     Review of Systems   Constitutional:  Negative for fever.   Musculoskeletal:  Negative for joint swelling.   Skin:         Red, swollen, pain level # 5 left index finger       Past Medical History:   Diagnosis Date    Attention deficit disorder     Cervical disc disorder     Shown in June 2020 MRI    Dyslipidemia     GERD (gastroesophageal reflux disease)     Heavy periods     High blood pressure     Hypertension     Low back strain     June 2020 MRI    Lumbosacral disc disease     June 2020 MRI    Menses painful     S/P laparoscopic hysterectomy , 6/22/21    Nonscarring hair loss     subclinical hyperthyroidism    Obesity     PONV (postoperative nausea and vomiting)     Seasonal allergies     Thoracic disc disorder     June 2020 MRI    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  Negative TSI, TBG antibody, TPO antibodies.    Vitamin D deficiency        Family History   Adopted: Yes   Problem Relation Age of Onset    Breast cancer Mother     Alcohol abuse Father     Malig Hyperthermia Neg Hx        Social History     Socioeconomic History    Marital status:    Tobacco Use    Smoking status: Never     Passive exposure: Never    Smokeless tobacco: Never   Vaping Use    Vaping status: Never Used   Substance and Sexual Activity    Alcohol use: Yes     Comment: Only drink on [employer]    Drug use: Never     "Sexual activity: Yes     Partners: Male     Birth control/protection: Hysterectomy, Surgical     Comment: Hysterectomy June 2021       Radha Robles  reports that she has never smoked. She has never been exposed to tobacco smoke. She has never used smokeless tobacco.       Ht 172.7 cm (68\")   Wt 88 kg (194 lb)   LMP 05/26/2021 (Exact Date)   BMI 29.50 kg/m²     PHYSICAL EXAM  Physical Exam   Constitutional: She is oriented to person, place, and time. She appears well-developed.   HENT:   Head: Normocephalic and atraumatic.   Nose: Nose normal.   Eyes: Lids are normal. Right eye exhibits no discharge. Left eye exhibits no discharge. Right conjunctiva is not injected. Left conjunctiva is not injected.   Pulmonary/Chest:  No respiratory distress.  Neurological: She is alert and oriented to person, place, and time. No cranial nerve deficit.   Skin:   Left index finger erythematous, edematous distally   Psychiatric: She has a normal mood and affect. Her speech is normal and behavior is normal. Judgment and thought content normal.       Results for orders placed or performed during the hospital encounter of 08/02/23   Rapid Strep A Screen - Swab, Throat    Specimen: Throat; Swab   Result Value Ref Range    STREP A PCR Not Detected Not Detected   COVID-19 and FLU A/B PCR - Swab, Nasopharynx    Specimen: Nasopharynx; Swab   Result Value Ref Range    COVID19 Not Detected Not Detected - Ref. Range    Influenza A PCR Not Detected Not Detected    Influenza B PCR Not Detected Not Detected       Diagnoses and all orders for this visit:    1. Finger infection (Primary)    Other orders  -     cephalexin (Keflex) 500 MG capsule; Take 1 capsule by mouth 4 (Four) Times a Day for 7 days.  Dispense: 28 capsule; Refill: 0  -     mupirocin (BACTROBAN) 2 % ointment; Apply 1 Application topically to the appropriate area as directed 3 (Three) Times a Day.  Dispense: 22 g; Refill: 0    Wash with mild soap and water apply mupirocin " ointment as directed  Keep covered at work  Cephalexin as directed  Probiotics for two weeks related to taking antibiotics. The pharmacist can help you with this if needed. Do not take within two hours of antibiotic.    FOLLOW-UP  If symptoms worsen or persist follow up with PC,  Urgent Care or ER     Patient verbalizes understanding of medication dosage, comfort measures, instructions for treatment and follow-up.    Brianna Medina, APRN  03/26/2024  08:50 EDT    The use of a video visit has been reviewed with the patient and verbal informed consent has been obtained. Myself and Radha Robles participated in this visit. The patient is located in [address] [place] [address].    I am located in [place], [place]. [employer] and [employer] were utilized. I spent 24 minutes in the patient's chart for this visit.

## 2024-04-02 ENCOUNTER — LAB (OUTPATIENT)
Facility: HOSPITAL | Age: 49
End: 2024-04-02
Payer: COMMERCIAL

## 2024-04-02 ENCOUNTER — OFFICE VISIT (OUTPATIENT)
Dept: INTERNAL MEDICINE | Facility: CLINIC | Age: 49
End: 2024-04-02
Payer: COMMERCIAL

## 2024-04-02 VITALS
BODY MASS INDEX: 30.28 KG/M2 | HEART RATE: 73 BPM | WEIGHT: 199.8 LBS | OXYGEN SATURATION: 97 % | SYSTOLIC BLOOD PRESSURE: 150 MMHG | HEIGHT: 68 IN | DIASTOLIC BLOOD PRESSURE: 90 MMHG | TEMPERATURE: 97.7 F

## 2024-04-02 DIAGNOSIS — F90.9 ATTENTION DEFICIT HYPERACTIVITY DISORDER (ADHD), UNSPECIFIED ADHD TYPE: Primary | ICD-10-CM

## 2024-04-02 DIAGNOSIS — I10 HYPERTENSION, UNSPECIFIED TYPE: ICD-10-CM

## 2024-04-02 DIAGNOSIS — Z00.00 ANNUAL PHYSICAL EXAM: Primary | ICD-10-CM

## 2024-04-02 DIAGNOSIS — E05.90 SUBCLINICAL HYPERTHYROIDISM: ICD-10-CM

## 2024-04-02 LAB
ALBUMIN SERPL-MCNC: 4.3 G/DL (ref 3.5–5.2)
ALBUMIN/GLOB SERPL: 1.4 G/DL
ALP SERPL-CCNC: 81 U/L (ref 39–117)
ALT SERPL W P-5'-P-CCNC: 17 U/L (ref 1–33)
ANION GAP SERPL CALCULATED.3IONS-SCNC: 7.2 MMOL/L (ref 5–15)
AST SERPL-CCNC: 14 U/L (ref 1–32)
BILIRUB SERPL-MCNC: 0.3 MG/DL (ref 0–1.2)
BUN SERPL-MCNC: 11 MG/DL (ref 6–20)
BUN/CREAT SERPL: 11.8 (ref 7–25)
CALCIUM SPEC-SCNC: 9.7 MG/DL (ref 8.6–10.5)
CHLORIDE SERPL-SCNC: 105 MMOL/L (ref 98–107)
CO2 SERPL-SCNC: 23.8 MMOL/L (ref 22–29)
CREAT SERPL-MCNC: 0.93 MG/DL (ref 0.57–1)
EGFRCR SERPLBLD CKD-EPI 2021: 75 ML/MIN/1.73
GLOBULIN UR ELPH-MCNC: 3 GM/DL
GLUCOSE SERPL-MCNC: 78 MG/DL (ref 65–99)
POTASSIUM SERPL-SCNC: 4.2 MMOL/L (ref 3.5–5.2)
PROT SERPL-MCNC: 7.3 G/DL (ref 6–8.5)
SODIUM SERPL-SCNC: 136 MMOL/L (ref 136–145)
T4 FREE SERPL-MCNC: 1.21 NG/DL (ref 0.93–1.7)
TSH SERPL DL<=0.05 MIU/L-ACNC: 0.17 UIU/ML (ref 0.27–4.2)

## 2024-04-02 PROCEDURE — 84443 ASSAY THYROID STIM HORMONE: CPT | Performed by: STUDENT IN AN ORGANIZED HEALTH CARE EDUCATION/TRAINING PROGRAM

## 2024-04-02 PROCEDURE — 99214 OFFICE O/P EST MOD 30 MIN: CPT | Performed by: STUDENT IN AN ORGANIZED HEALTH CARE EDUCATION/TRAINING PROGRAM

## 2024-04-02 PROCEDURE — 80053 COMPREHEN METABOLIC PANEL: CPT | Performed by: STUDENT IN AN ORGANIZED HEALTH CARE EDUCATION/TRAINING PROGRAM

## 2024-04-02 PROCEDURE — 84439 ASSAY OF FREE THYROXINE: CPT | Performed by: STUDENT IN AN ORGANIZED HEALTH CARE EDUCATION/TRAINING PROGRAM

## 2024-04-02 PROCEDURE — 36415 COLL VENOUS BLD VENIPUNCTURE: CPT | Performed by: STUDENT IN AN ORGANIZED HEALTH CARE EDUCATION/TRAINING PROGRAM

## 2024-04-02 PROCEDURE — G0483 DRUG TEST DEF 22+ CLASSES: HCPCS | Performed by: STUDENT IN AN ORGANIZED HEALTH CARE EDUCATION/TRAINING PROGRAM

## 2024-04-02 PROCEDURE — 80307 DRUG TEST PRSMV CHEM ANLYZR: CPT | Performed by: STUDENT IN AN ORGANIZED HEALTH CARE EDUCATION/TRAINING PROGRAM

## 2024-04-02 RX ORDER — VALSARTAN 160 MG/1
160 TABLET ORAL DAILY
Qty: 90 TABLET | Refills: 0 | Status: SHIPPED | OUTPATIENT
Start: 2024-04-02

## 2024-04-02 RX ORDER — DEXTROAMPHETAMINE SACCHARATE, AMPHETAMINE ASPARTATE, DEXTROAMPHETAMINE SULFATE AND AMPHETAMINE SULFATE 3.75; 3.75; 3.75; 3.75 MG/1; MG/1; MG/1; MG/1
15 TABLET ORAL 2 TIMES DAILY
Qty: 60 TABLET | Refills: 0 | Status: SHIPPED | OUTPATIENT
Start: 2024-04-02 | End: 2024-04-05 | Stop reason: SDUPTHER

## 2024-04-02 NOTE — PROGRESS NOTES
"Chief Complaint  Establish Care, ADD, and Hypertension    Subjective        Radha Robles presents to [facility] PRIMARY [facility]  History of Present Illness    Ms. Robles presents to clinic today as a new patient to establish care    She has no specific acute complaints or concerns today.  She currently takes valsartan as well as spironolactone for hypertension however she has been out of her valsartan for some time now.    She also is currently on Adderall 15 mg twice daily for ADHD.  She has not had this refilled in a while and reports improvement in focus since starting, diagnosed about 15 years ago.       Review of Systems   Respiratory:  Negative for chest tightness and shortness of breath.         Objective    Vital Signs:  /90   Pulse 73   Temp 97.7 °F (36.5 °C) (Temporal)   Ht 172.7 cm (68\")   Wt 90.6 kg (199 lb 12.8 oz)   SpO2 97%   BMI 30.38 kg/m²   Estimated body mass index is 30.38 kg/m² as calculated from the following:    Height as of this encounter: 172.7 cm (68\").    Weight as of this encounter: 90.6 kg (199 lb 12.8 oz).               Physical Exam  Vitals reviewed.   Constitutional:       General: She is not in acute distress.     Appearance: Normal appearance. She is obese. She is not toxic-appearing.   HENT:      Head: Normocephalic and atraumatic.   Eyes:      General: No scleral icterus.     Conjunctiva/sclera: Conjunctivae normal.   Cardiovascular:      Rate and Rhythm: Normal rate and regular rhythm.      Heart sounds: Normal heart sounds.   Pulmonary:      Effort: Pulmonary effort is normal. No respiratory distress.      Breath sounds: Normal breath sounds. No wheezing.   Skin:     General: Skin is warm and dry.   Neurological:      Mental Status: She is alert and oriented to person, place, and time.   Psychiatric:         Mood and Affect: Mood normal.         Behavior: Behavior normal.        Result Review :                   Assessment and Plan   Diagnoses " and all orders for this visit:    1. Attention deficit hyperactivity disorder (ADHD), unspecified ADHD type (Primary)  -     Compliance Drug Analysis, Ur - Urine, Clean Catch  -     amphetamine-dextroamphetamine (ADDERALL) 15 MG tablet; Take 1 tablet by mouth 2 (two) times daily.  Max Daily Amount: 2 tablets  Dispense: 60 tablet; Refill: 0    2. Hypertension, unspecified type  -     valsartan (DIOVAN) 160 MG tablet; Take 1 tablet by mouth daily.  Dispense: 90 tablet; Refill: 0  -     Comprehensive Metabolic Panel    3. Subclinical hyperthyroidism  -     TSH Rfx On Abnormal To Free T4      Reviewed previous labs    ADHD chronic, stable. Previous PCP was managing ADHD, diagnosed about 15 years ago. Reviewed [facility]IR, will have drug contract filled today and UDS obtained and refill at previous dose.     HTN is chronic, uncontrolled BP above goal today however she has been out of her BP medication. Will refill valsartan today and labs to make sure electrolytes stable.     Previous review notes low TSH in 2022, unclear if this was every followed up on so will repeat today. May need further workup with imaging or repeat labs if persistently elevated, unable to appreciate goiter or hyperthyroid symptoms.            Follow Up   Return in about 3 months (around 7/2/2024) for Annual physical.  Patient was given instructions and counseling regarding her condition or for health maintenance advice. Please see specific information pulled into the AVS if appropriate.

## 2024-04-03 DIAGNOSIS — E05.90 SUBCLINICAL HYPERTHYROIDISM: Primary | ICD-10-CM

## 2024-04-05 DIAGNOSIS — F90.9 ATTENTION DEFICIT HYPERACTIVITY DISORDER (ADHD), UNSPECIFIED ADHD TYPE: ICD-10-CM

## 2024-04-05 NOTE — TELEPHONE ENCOUNTER
Caller: Travis Radha JA    Relationship: Self  [phone]    Requested Prescriptions:   Requested Prescriptions     Pending Prescriptions Disp Refills    amphetamine-dextroamphetamine (ADDERALL) 15 MG tablet 60 tablet 0     Sig: Take 1 tablet by mouth 2 (two) times daily.  Max Daily Amount: 2 tablets        Pharmacy where request should be sent: [facility]     Last office visit with prescribing clinician: 4/2/2024   Last telemedicine visit with prescribing clinician: Visit date not found   Next office visit with prescribing clinician: 7/2/2024     Additional details provided by patient: NEEDS PRE- AUTH     Does the patient have less than a 3 day supply:  [x] Yes  [] No    Would you like a call back once the refill request has been completed: [] Yes [x] No    If the office needs to give you a call back, can they leave a voicemail: [x] Yes [] No    Robert Morrison Rep   04/05/24 15:05 EDT

## 2024-04-06 ENCOUNTER — HOSPITAL ENCOUNTER (OUTPATIENT)
Dept: MRI IMAGING | Facility: HOSPITAL | Age: 49
Discharge: HOME | End: 2024-04-06
Admitting: NURSE PRACTITIONER
Payer: COMMERCIAL

## 2024-04-06 DIAGNOSIS — Z91.89 AT HIGH RISK FOR BREAST CANCER: ICD-10-CM

## 2024-04-06 PROCEDURE — 77049 MRI BREAST C-+ W/CAD BI: CPT

## 2024-04-06 PROCEDURE — A9577 INJ MULTIHANCE: HCPCS | Performed by: NURSE PRACTITIONER

## 2024-04-06 PROCEDURE — 0 GADOBENATE DIMEGLUMINE 529 MG/ML SOLUTION: Performed by: NURSE PRACTITIONER

## 2024-04-06 RX ADMIN — GADOBENATE DIMEGLUMINE 18 ML: 529 INJECTION, SOLUTION INTRAVENOUS at 08:01

## 2024-04-07 LAB — DRUGS UR: NORMAL

## 2024-04-08 RX ORDER — MINOXIDIL 2.5 MG/1
0.5 TABLET ORAL DAILY
COMMUNITY

## 2024-04-08 RX ORDER — DEXTROAMPHETAMINE SACCHARATE, AMPHETAMINE ASPARTATE, DEXTROAMPHETAMINE SULFATE AND AMPHETAMINE SULFATE 3.75; 3.75; 3.75; 3.75 MG/1; MG/1; MG/1; MG/1
15 TABLET ORAL 2 TIMES DAILY
Qty: 60 TABLET | Refills: 0 | Status: SHIPPED | OUTPATIENT
Start: 2024-04-08

## 2024-04-10 ENCOUNTER — PRIOR AUTHORIZATION (OUTPATIENT)
Dept: INTERNAL MEDICINE | Facility: CLINIC | Age: 49
End: 2024-04-10

## 2024-04-10 NOTE — TELEPHONE ENCOUNTER
PA request for adderall pending plan decision no further action needed at this time Case ID: [id number]

## 2024-04-16 ENCOUNTER — TELEPHONE (OUTPATIENT)
Dept: SURGERY | Facility: CLINIC | Age: 49
End: 2024-04-16
Payer: COMMERCIAL

## 2024-04-26 NOTE — PROGRESS NOTES
BREAST [facility]ARE [facility]ENTER     Referring Provider: No ref. provider found     Chief complaint: abnormal breast imaging     HPI: Ms. Radha Robles is a 49 yo woman, seen at the request of No ref. provider found, for abnormal breast imaging    I personally reviewed her records and summarized her relevant breast history/imagin2020 bilateral screening mammogram at [facility]  The breasts are heterogeneously dense.  There are bilateral retropectoral saline implants.  There are stable areas of asymmetric breast tissue seen in both breast.  Scattered benign-appearing calcifications are noted.  No suspicious masses suspicious microcalcifications or architectural distortion identified.  Impression  Stable areas of asymmetric breast tissue in both breast are benign negative.  BI-RADS 2    10/10/2022 bilateral screening mammogram at [facility]  The breasts are heterogeneously dense.  There are bilateral retropectoral saline implants.  Finding 1 there is a focal asymmetry measuring 9 mm with associated calcifications seen in the posterior one third region of the right breast at 3:00.  There are no significant changes from the prior exam.  Finding 2 there are round amorphous and punctuate calcifications with scattered distribution seen in both breast.  There is no significant change from prior exam.  The parenchyma include stable nodular asymmetries.  No suspicious masses suspicious microcalcifications or areas of architectural distortion are identified.  Impression  Finding 1 focal asymmetry in the right breast requires additional evaluation.  Diagnostic mammogram with LM and CC implant displaced with more posterior tissue and limited breast ultrasound recommended.  Magnification mammography is recommended.  Finding 2 calcifications in both breast are benign negative.  BI-RADS 0    10/24/2022 right diagnostic mammogram and right limited breast ultrasound at [facility]  The breast is heterogeneously dense.  There are bilateral retropectoral  saline implants.  Additional evaluation was performed for the focal asymmetry in the right breast 3:00 seen on 10/10/2022.  On the present examination there is a focal asymmetry measuring 9 mm with single adjacent round calcification in the posterior one third region of the right breast at 3:00.  There are no suspicious calcifications seen.  There is an area of subtle fibrocystic change with isohypoechoic and anechoic areas.  This is best seen mammographically.  Impression  Focal asymmetry in the right breast is suspicious.  Stereotactic biopsy is recommended.  BI-RADS 4    11/16/2022 stereotactic biopsy of right breast at [facility] [place]  Pathology is benign and concordant.  Pathology returned as fibrocystic changes.  A follow-up mammogram in 6 months is recommended.    5/8/2023 right diagnostic mammogram at [facility] [place]  The breast is heterogeneously dense.  There are bilateral retropectoral saline implants.  There is a stable biopsy clip seen in the right breast.  No suspicious masses suspicious microcalcifications or architectural distortion are identified.  There is been no significant change from prior exam.  Impression  Stable biopsy clip in the right breast is benign negative.  BI-RADS 2      She has a personal history of bilateral retropectoral saline implants in 2004 and a total hysterectomy in 2021    She has a family history of breast cancer in her mother age 68.  She denies any family history of ovarian cancer.     Today she presents with concerns regarding recent biopsy and high risk assessment.  She was adopted but recently found her birth mother who had breast cancer at the age of 68.  She denies any breast lumps, pain, skin changes, or nipple discharge.    10/17/2023   Patient presenting to the office today for routine follow-up.  She had a screening mammogram on 10/11 that resulted as BI-RADS 2.  She has no new breast complaints or concerns today.    4/29/2024 interval history  Patient presenting to the office today  for routine follow-up.  On 4/6/2024 she had a bilateral breast MRI that resulted as a BI-RADS 2.  She has no new breast complaints or concerns today.    Review of Systems - Oncology    Medications:    Current Outpatient Medications:     amphetamine-dextroamphetamine (ADDERALL) 15 MG tablet, Take 1 tablet by mouth 2 (two) times daily.  Max Daily Amount: 2 tablets, Disp: 60 tablet, Rfl: 0    minoxidil (LONITEN) 2.5 MG tablet, Take 0.5 mg by mouth Daily., Disp: , Rfl:     mupirocin (BACTROBAN) 2 % ointment, Apply topically to the appropriate area as directed 3 (Three) Times a Day., Disp: 22 g, Rfl: 0    spironolactone (Aldactone) 100 MG tablet, Take 1 tablet by mouth 2 (Two) Times a Day., Disp: 60 tablet, Rfl: 5    tiZANidine (ZANAFLEX) 4 MG tablet, Take 1 tablet by mouth 3 (Three) Times a Day As Needed for muscle spasms for up to 15 days., Disp: 45 tablet, Rfl: 1    valsartan (DIOVAN) 160 MG tablet, Take 1 tablet by mouth daily., Disp: 90 tablet, Rfl: 0    Allergies:  No Known Allergies    Medical history:  Past Medical History:   Diagnosis Date    Attention deficit disorder     Cervical disc disorder     Shown in June 2020 MRI    Dyslipidemia     GERD (gastroesophageal reflux disease)     Heavy periods     High blood pressure     Hypertension     Low back pain 2004    Low back strain     June 2020 MRI    Lumbosacral disc disease     June 2020 MRI    Menses painful     S/P laparoscopic hysterectomy , 6/22/21    Nonscarring hair loss     subclinical hyperthyroidism    Obesity     PONV (postoperative nausea and vomiting)     Seasonal allergies     Thoracic disc disorder     June 2020 MRI    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  Negative TSI, TBG antibody, TPO antibodies.    Vitamin D deficiency        Surgical History:  Past Surgical History:   Procedure Laterality Date    BREAST AUGMENTATION      2005,   ,[facility]     BREAST BIOPSY  10/16/2022    COLONOSCOPY N/A 10/30/2018    Two 3 to 4 mm polyps in the sigmoid colon, [facility]. PATH: Hyperplastic polyp    COLONOSCOPY  10/30/2018    With Dr. Mendez    COSMETIC SURGERY  2004    Breast Augmentation    TOTAL LAPAROSCOPIC HYSTERECTOMY N/A 2021    Procedure: TOTAL LAPAROSCOPIC HYSTERECTOMY BILATERAL SALPINGECTOMY;  Surgeon: Minerva Baer MD;  Location: [facility] OR;  Service: Gynecology;  Laterality: N/A;    WISDOM TOOTH EXTRACTION         Family History:  Family History   Adopted: Yes   Problem Relation Age of Onset    Breast cancer Mother     Alcohol abuse Father              Malig Hyperthermia Neg Hx        Social History:   Social History     Socioeconomic History    Marital status:    Tobacco Use    Smoking status: Never     Passive exposure: Never    Smokeless tobacco: Never   Vaping Use    Vaping status: Never Used   Substance and Sexual Activity    Alcohol use: Yes     Comment: Only drink on [employer] and [email or web address]    Drug use: Never    Sexual activity: Yes     Partners: Male     Birth control/protection: Hysterectomy, Surgical     Comment: Hysterectomy 2021     Patient drinks 1-2 servings of caffeine per day.       GYNECOLOGIC HISTORY:   . P: 0. AB: 0.  Last menstrual period: 2021  Age at menarche: 12  Age at first childbirth: N/A  Lactation/How long: N/A  Age at menopause: N/A  Total years of oral contraceptive use: 25 years  Total years of hormone replacement therapy: N/A      Physical Exam  There were no vitals filed for this visit.    ECOG 0 - Asymptomatic  General: NAD, well appearing  Psych: a&o x 3, normal mood and affect  Eyes: EOMI, no scleral icterus  ENMT: neck supple without masses or thyromegaly, mucus membranes moist  Resp: normal effort, CTAB  CV: RRR, no murmurs, no edema   GI: soft, NT, ND  MSK: normal gait, normal ROM in bilateral shoulders  Lymph nodes: no cervical, supraclavicular or axillary  lymphadenopathy  Breast: symmetric, large  Right: No visible abnormalities on inspection while seated, with arms raised or hands on hips. No masses, skin changes, or nipple abnormalities.  Implant intact  Left: No visible abnormalities on inspection while seated, with arms raised or hands on hips. No masses, skin changes, or nipple abnormalities.  Implant intact    Imaging:  [employer] genetic testing  Negative      10/11/2023 bilateral screening mammogram at [facility]  FINDINGS: Bilateral digital CC and MLO mammographic and digital  Tomosynthesis images were obtained. Comparison is made to prior studies  dated 5/8/2023, 11/16/2022 and 10/24/2022. The parenchyma of both  breasts demonstrates a heterogeneously dense pattern which lessens the  sensitivity. I see no new or dominant masses, areas of architectural  distortion or skin thickening. Stable scattered punctate  benign-appearing calcifications are noted in both breast. There is no  evidence for axillary lymphadenopathy or nipple retraction. Intact  bilateral subpectoral saline implants are noted.  IMPRESSION:  1. There is no evidence for malignancy or significant change in either  breast. Routine followup mammography is recommended.  Given the density  of the patient's breast tissue, correlation with screening bilateral  breast sonography and/or bilateral breast MRI may provide additional  benefit and should be considered.  BI-RADS category 2: Benign.     4/6/2024 bilateral breast MRI [facility]  RIGHT BREAST:    No suspicious enhancing mass or area of non-mass enhancement is  identified.  The visualized axilla is within normal limits.  LEFT BREAST:    No suspicious enhancing mass or area of non-mass enhancement is  identified.  The visualized axilla is within normal limits.  EXTRAMAMMARY FINDINGS:  There are no pathologically enlarged internal mammary chain lymph nodes  on either side.    Within the subcutaneous tissues overlying the manubrium, just to the  left of midline,  there is a 1.2 x 0.8 cm oval T2 hyperintense  nonenhancing mass, which abuts the overlying skin. This is suggestive of  an epidermal inclusion or sebaceous cyst.   IMPRESSION AND RECOMMENDATION:  1.  No MRI evidence of malignancy in either breast. Recommend annual  screening mammogram in October 2024.  2.  A 1.2 cm nonenhancing subcutaneous mass overlying the manubrium  likely reflects a sebaceous or epidermal inclusion cyst. Correlate with  direct inspection. Targeted ultrasound of this area could be performed  for confirmation, if clinically indicated.  BI-RADS Category 2: Benign    Assessment:    Family history of breast cancer  High risk for breast cancer-according to [facility] she has a 31.1% lifetime risk of breast cancer and a 9.6% 10-year risk    Discussion:  We discussed management options for individuals who are at increased risk (>20% lifetime risk):  1) High risk screening - Annual mammogram and annual breast MRI, alternating one test every 6 months, biannual clinical exam and monthly self breast exam. She meets criteria for high risk screening.  2) Chemoprevention with Tamoxifen, Raloxifene or Exemestane. These may reduce risk up to 50%. I reviewed that these particular medications are not without risks and the risk/benefit ratio must be considered carefully. She is not interested in this currently.  3) Risk reducing surgery such as prophylactic mastectomy  which may reduce risk by 90-95%. We discussed that this is a relatively radical strategy and is generally reserved for individuals with a known genetic mutation predisposing them to an increased risk (~50% risk) of breast cancer. She does not meet criteria for risk reducing surgery.   4) I discussed the importance of exercise and weight management as part of a risk reducing strategy, since increased BMI is associated with an increased risk of breast cancer. This is especially true in her case, as I expect her risk would decrease as she lost  weight.    Risk reducing agents should be recommended when 5 year risk per Carri model is at least 3% or 10 year risk by Tyrer Cuzick model is at least 5%.  Referral to medical oncology made to discuss.      Plan:  1.mammo in oct followed by exam  2.  Monthly self breast examinations  3.  Return to the office if any new issues  4. Pt doesn't want a referral to oncology at this time       HINA Randolph    I have spent 25 mins in face to face time with the patient and in chart review.    CC:  No ref. provider found  Shaun Garcia MD    EMR [employer]/transcription disclaimer:  Dictated using Dragon dictation

## 2024-04-29 ENCOUNTER — OFFICE VISIT (OUTPATIENT)
Dept: SURGERY | Facility: CLINIC | Age: 49
End: 2024-04-29
Payer: COMMERCIAL

## 2024-04-29 ENCOUNTER — HOSPITAL ENCOUNTER (OUTPATIENT)
Dept: NUCLEAR MEDICINE | Facility: HOSPITAL | Age: 49
Discharge: HOME | End: 2024-04-29
Payer: COMMERCIAL

## 2024-04-29 ENCOUNTER — TELEPHONE (OUTPATIENT)
Dept: SURGERY | Facility: CLINIC | Age: 49
End: 2024-04-29
Payer: COMMERCIAL

## 2024-04-29 VITALS
HEART RATE: 100 BPM | HEIGHT: 68 IN | BODY MASS INDEX: 29.4 KG/M2 | OXYGEN SATURATION: 97 % | SYSTOLIC BLOOD PRESSURE: 128 MMHG | DIASTOLIC BLOOD PRESSURE: 82 MMHG | WEIGHT: 194 LBS

## 2024-04-29 DIAGNOSIS — Z91.89 AT HIGH RISK FOR BREAST CANCER: Primary | ICD-10-CM

## 2024-04-29 DIAGNOSIS — Z12.31 ENCOUNTER FOR SCREENING MAMMOGRAM FOR MALIGNANT NEOPLASM OF BREAST: ICD-10-CM

## 2024-04-29 DIAGNOSIS — Z80.3 FAMILY HISTORY OF BREAST CANCER: ICD-10-CM

## 2024-04-29 PROCEDURE — A9516 IODINE I-123 SOD IODIDE MIC: HCPCS | Performed by: STUDENT IN AN ORGANIZED HEALTH CARE EDUCATION/TRAINING PROGRAM

## 2024-04-29 PROCEDURE — 78014 THYROID IMAGING W/BLOOD FLOW: CPT

## 2024-04-29 PROCEDURE — 0 SODIUM IODIDE 3.7 MBQ CAPSULE: Performed by: STUDENT IN AN ORGANIZED HEALTH CARE EDUCATION/TRAINING PROGRAM

## 2024-04-29 PROCEDURE — 99213 OFFICE O/P EST LOW 20 MIN: CPT | Performed by: NURSE PRACTITIONER

## 2024-04-29 RX ORDER — SODIUM IODIDE I 123 100 UCI/1
1 CAPSULE, GELATIN COATED ORAL
Status: COMPLETED | OUTPATIENT
Start: 2024-04-29 | End: 2024-04-29

## 2024-04-29 RX ADMIN — SODIUM IODIDE I 123 1 CAPSULE: 100 CAPSULE, GELATIN COATED ORAL at 13:09

## 2024-04-29 NOTE — TELEPHONE ENCOUNTER
Stum for pt to call back to see if she wanted to come in sooner I have a 1240, 1 or even some morning appts     Her appt today that's at 320 with nicki garrido

## 2024-04-30 ENCOUNTER — HOSPITAL ENCOUNTER (OUTPATIENT)
Dept: NUCLEAR MEDICINE | Facility: HOSPITAL | Age: 49
Discharge: HOME | End: 2024-04-30
Payer: COMMERCIAL

## 2024-05-01 ENCOUNTER — LAB (OUTPATIENT)
Dept: LAB | Facility: HOSPITAL | Age: 49
End: 2024-05-01
Payer: COMMERCIAL

## 2024-05-01 DIAGNOSIS — Z00.00 ANNUAL PHYSICAL EXAM: ICD-10-CM

## 2024-05-01 LAB
CHOLEST SERPL-MCNC: 140 MG/DL (ref 0–200)
HDLC SERPL QL: 3.26
HDLC SERPL-MCNC: 43 MG/DL (ref 40–60)
LDLC SERPL CALC-MCNC: 82 MG/DL (ref 0–100)
TRIGL SERPL-MCNC: 73 MG/DL (ref 0–150)
VLDLC SERPL-MCNC: 15 MG/DL (ref 5–40)

## 2024-05-01 PROCEDURE — 80061 LIPID PANEL: CPT

## 2024-05-01 PROCEDURE — 86376 MICROSOMAL ANTIBODY EACH: CPT | Performed by: STUDENT IN AN ORGANIZED HEALTH CARE EDUCATION/TRAINING PROGRAM

## 2024-05-01 PROCEDURE — 84481 FREE ASSAY (FT-3): CPT | Performed by: STUDENT IN AN ORGANIZED HEALTH CARE EDUCATION/TRAINING PROGRAM

## 2024-05-01 PROCEDURE — 84445 ASSAY OF TSI GLOBULIN: CPT | Performed by: STUDENT IN AN ORGANIZED HEALTH CARE EDUCATION/TRAINING PROGRAM

## 2024-05-01 PROCEDURE — 86800 THYROGLOBULIN ANTIBODY: CPT | Performed by: STUDENT IN AN ORGANIZED HEALTH CARE EDUCATION/TRAINING PROGRAM

## 2024-05-06 DIAGNOSIS — F90.9 ATTENTION DEFICIT HYPERACTIVITY DISORDER (ADHD), UNSPECIFIED ADHD TYPE: ICD-10-CM

## 2024-05-06 RX ORDER — DEXTROAMPHETAMINE SACCHARATE, AMPHETAMINE ASPARTATE, DEXTROAMPHETAMINE SULFATE AND AMPHETAMINE SULFATE 3.75; 3.75; 3.75; 3.75 MG/1; MG/1; MG/1; MG/1
15 TABLET ORAL 2 TIMES DAILY
Qty: 60 TABLET | Refills: 0 | Status: SHIPPED | OUTPATIENT
Start: 2024-05-06

## 2024-06-13 DIAGNOSIS — F90.9 ATTENTION DEFICIT HYPERACTIVITY DISORDER (ADHD), UNSPECIFIED ADHD TYPE: ICD-10-CM

## 2024-06-13 RX ORDER — DEXTROAMPHETAMINE SACCHARATE, AMPHETAMINE ASPARTATE, DEXTROAMPHETAMINE SULFATE AND AMPHETAMINE SULFATE 3.75; 3.75; 3.75; 3.75 MG/1; MG/1; MG/1; MG/1
15 TABLET ORAL 2 TIMES DAILY
Qty: 60 TABLET | Refills: 0 | Status: SHIPPED | OUTPATIENT
Start: 2024-06-13

## 2024-06-17 DIAGNOSIS — I10 HYPERTENSION, UNSPECIFIED TYPE: ICD-10-CM

## 2024-06-17 RX ORDER — VALSARTAN 160 MG/1
160 TABLET ORAL DAILY
Qty: 90 TABLET | Refills: 0 | Status: SHIPPED | OUTPATIENT
Start: 2024-06-17

## 2024-06-21 ENCOUNTER — LAB (OUTPATIENT)
Dept: LAB | Facility: HOSPITAL | Age: 49
End: 2024-06-21
Payer: COMMERCIAL

## 2024-06-21 PROCEDURE — 86803 HEPATITIS C AB TEST: CPT | Performed by: STUDENT IN AN ORGANIZED HEALTH CARE EDUCATION/TRAINING PROGRAM

## 2024-06-21 PROCEDURE — 80053 COMPREHEN METABOLIC PANEL: CPT | Performed by: STUDENT IN AN ORGANIZED HEALTH CARE EDUCATION/TRAINING PROGRAM

## 2024-06-21 PROCEDURE — 83036 HEMOGLOBIN GLYCOSYLATED A1C: CPT | Performed by: STUDENT IN AN ORGANIZED HEALTH CARE EDUCATION/TRAINING PROGRAM

## 2024-07-02 ENCOUNTER — OFFICE VISIT (OUTPATIENT)
Dept: INTERNAL MEDICINE | Facility: CLINIC | Age: 49
End: 2024-07-02
Payer: COMMERCIAL

## 2024-07-02 VITALS
TEMPERATURE: 96.2 F | OXYGEN SATURATION: 98 % | SYSTOLIC BLOOD PRESSURE: 122 MMHG | DIASTOLIC BLOOD PRESSURE: 82 MMHG | WEIGHT: 196.2 LBS | HEIGHT: 68 IN | HEART RATE: 85 BPM | BODY MASS INDEX: 29.73 KG/M2

## 2024-07-02 DIAGNOSIS — F90.9 ATTENTION DEFICIT HYPERACTIVITY DISORDER (ADHD), UNSPECIFIED ADHD TYPE: ICD-10-CM

## 2024-07-02 DIAGNOSIS — R42 VERTIGO: ICD-10-CM

## 2024-07-02 DIAGNOSIS — Z00.00 ANNUAL PHYSICAL EXAM: Primary | ICD-10-CM

## 2024-07-02 DIAGNOSIS — E04.1 THYROID NODULE: ICD-10-CM

## 2024-07-02 RX ORDER — MECLIZINE HYDROCHLORIDE 25 MG/1
25 TABLET ORAL 3 TIMES DAILY PRN
Qty: 30 TABLET | Refills: 0 | Status: SHIPPED | OUTPATIENT
Start: 2024-07-02

## 2024-07-02 NOTE — PROGRESS NOTES
"Chief Complaint  Annual Exam and Dizziness    Subjective        Radha Robles presents to [facility] PRIMARY [facility]ARE  History of Present Illness    Mrs. Robles presents today for annual physical as well as complaints of dizziness    She states for the past week she has had about 10 episodes of dizziness that last about 30 to 45 seconds.  She describes the dizziness as the room rotating sideways, denies feeling of lightheadedness or presyncope.  She does state that she feels like walking may precipitate it somewhat.  She denies any headache, visual disturbances, slurred speech or numbness of her face or body or new onset weakness.  She does state that this has happened in the past a few years ago and it resolved spontaneously.     She does report that she has not exercising as much as she likes and knows she needs to increase this.    Review of Systems   Constitutional:  Negative for chills, fatigue and fever.   HENT:  Negative for rhinorrhea, sore throat and trouble swallowing.    Respiratory:  Negative for cough, shortness of breath and wheezing.    Cardiovascular:  Negative for chest pain and leg swelling.   Gastrointestinal:  Negative for abdominal pain, blood in stool, constipation and diarrhea.   Genitourinary:  Negative for difficulty urinating, dysuria and hematuria.   Neurological:  Positive for dizziness. Negative for syncope, facial asymmetry, speech difficulty, weakness and light-headedness.   Psychiatric/Behavioral:  Negative for decreased concentration and suicidal ideas. The patient is not nervous/anxious.         Objective   Vital Signs:  /82   Pulse 85   Temp 96.2 °F (35.7 °C) (Temporal)   Ht 172.7 cm (67.99\")   Wt 89 kg (196 lb 3.2 oz)   SpO2 98%   BMI 29.84 kg/m²   Estimated body mass index is 29.84 kg/m² as calculated from the following:    Height as of this encounter: 172.7 cm (67.99\").    Weight as of this encounter: 89 kg (196 lb 3.2 oz).       BMI is >= 25 and " <30. (Overweight) The following options were offered after discussion;: exercise counseling/recommendations and nutrition counseling/recommendations      Physical Exam  Vitals reviewed.   Constitutional:       General: She is not in acute distress.     Appearance: Normal appearance. She is not toxic-appearing.   HENT:      Head: Normocephalic and atraumatic.      Right Ear: Tympanic membrane normal. There is no impacted cerumen.      Left Ear: Tympanic membrane normal. There is no impacted cerumen.   Eyes:      General: No scleral icterus.     Conjunctiva/sclera: Conjunctivae normal.      Pupils: Pupils are equal, round, and reactive to light.   Cardiovascular:      Rate and Rhythm: Normal rate and regular rhythm.      Heart sounds: Normal heart sounds. No murmur heard.  Pulmonary:      Effort: Pulmonary effort is normal. No respiratory distress.      Breath sounds: Normal breath sounds. No wheezing.   Abdominal:      General: Abdomen is flat. There is no distension.      Palpations: Abdomen is soft.   Musculoskeletal:      Cervical back: Normal range of motion.   Skin:     General: Skin is warm and dry.   Neurological:      General: No focal deficit present.      Mental Status: She is alert and oriented to person, place, and time.      Cranial Nerves: Cranial nerves 2-12 are intact. No cranial nerve deficit or dysarthria.      Motor: No weakness.      Coordination: Romberg sign negative. Coordination normal.      Gait: Gait is intact.   Psychiatric:         Mood and Affect: Mood normal.         Behavior: Behavior normal.        Result Review :                   Assessment and Plan   Diagnoses and all orders for this visit:    1. Annual physical exam (Primary)    2. Vertigo  -     Ambulatory Referral to Physical Therapy  -     meclizine (ANTIVERT) 25 MG tablet; Take 1 tablet by mouth 3 (Three) Times a Day As Needed for Dizziness.  Dispense: 30 tablet; Refill: 0    3. Thyroid nodule  -     US  Thyroid      Immunizations: may be due for Tdap however she believes she had about 8y ago. She will look into this and we can give at next visit. Recommend shingrix at local pharmacy   Cancer screening: due for pap smear, she will reach out to try and reschedule with OBGYN. Mammogram scheduled for October   Recommend exercise 150min/week, healthy dietary habits     Her vertigo is unclear in etiology however there are no signs on exam today of any central vertigo and no red flag sx/s. She tried Epley at home and did not appreciate significant improvement, however I suspect a peripheral cause is the etiology so will refer to PT and trial of meclizine. We can consider imaging if worsening or persistent symptoms    Reviewed thyroid uptake, we will repeat thyroid US given known thyroid nodule    RTC in about 4mo for vertigo f/u            Follow Up   Return in about 4 months (around 11/2/2024).  Patient was given instructions and counseling regarding her condition or for health maintenance advice. Please see specific information pulled into the AVS if appropriate.

## 2024-07-03 RX ORDER — DEXTROAMPHETAMINE SACCHARATE, AMPHETAMINE ASPARTATE, DEXTROAMPHETAMINE SULFATE AND AMPHETAMINE SULFATE 3.75; 3.75; 3.75; 3.75 MG/1; MG/1; MG/1; MG/1
15 TABLET ORAL 2 TIMES DAILY
Qty: 60 TABLET | Refills: 0 | Status: SHIPPED | OUTPATIENT
Start: 2024-07-11

## 2024-07-19 ENCOUNTER — HOSPITAL ENCOUNTER (OUTPATIENT)
Dept: ULTRASOUND IMAGING | Facility: HOSPITAL | Age: 49
End: 2024-07-19
Payer: COMMERCIAL

## 2024-07-26 ENCOUNTER — HOSPITAL ENCOUNTER (OUTPATIENT)
Dept: ULTRASOUND IMAGING | Facility: HOSPITAL | Age: 49
Discharge: HOME | End: 2024-07-26
Admitting: STUDENT IN AN ORGANIZED HEALTH CARE EDUCATION/TRAINING PROGRAM
Payer: COMMERCIAL

## 2024-07-26 PROCEDURE — 76536 US EXAM OF HEAD AND NECK: CPT

## 2024-07-30 DIAGNOSIS — E04.1 THYROID NODULE: Primary | ICD-10-CM

## 2024-08-09 DIAGNOSIS — F90.9 ATTENTION DEFICIT HYPERACTIVITY DISORDER (ADHD), UNSPECIFIED ADHD TYPE: ICD-10-CM

## 2024-08-09 RX ORDER — DEXTROAMPHETAMINE SACCHARATE, AMPHETAMINE ASPARTATE, DEXTROAMPHETAMINE SULFATE AND AMPHETAMINE SULFATE 3.75; 3.75; 3.75; 3.75 MG/1; MG/1; MG/1; MG/1
15 TABLET ORAL 2 TIMES DAILY
Qty: 60 TABLET | Refills: 0 | Status: SHIPPED | OUTPATIENT
Start: 2024-08-09

## 2024-08-12 NOTE — PROGRESS NOTES
Subjective   Patient ID: Radha Robles is a 49 y.o. female is here today as a self referral for neck pain, R arm, R leg numbness.    Treatment: MEERA x3 2023    Today patient states that she is having low back pain, along with neck pain, N/T in the R arm and leg, along with gait/balance       History of Present Illness    This patient has been having pain in her neck with numbness and tingling and pain in her right arm.  She also has pain all the way down the right side of her back with occasional pain in her right leg.  Left side is mostly okay.  She has been treated with epidural blocks last year in her lower back but no other treatment so far.  She has no difficulty with bowel bladder control.    The following portions of the patient's history were reviewed and updated as appropriate: allergies, current medications, past family history, past medical history, past social history, past surgical history, and problem list.    Review of Systems   Constitutional:  Negative for chills and fever.   HENT:  Negative for congestion.    Musculoskeletal:  Positive for back pain, gait problem, myalgias, neck pain and neck stiffness.   Neurological:  Positive for weakness and numbness.       I reviewed the review of systems listed by the patient and discussed by my MA    Objective     There were no vitals filed for this visit.  There is no height or weight on file to calculate BMI.    Tobacco Use: Low Risk  (8/13/2024)    Patient History     Smoking Tobacco Use: Never     Smokeless Tobacco Use: Never     Passive Exposure: Never          Physical Exam  Constitutional:       Appearance: She is well-developed.   HENT:      Head: Normocephalic and atraumatic.   Eyes:      Extraocular Movements: EOM normal.      Conjunctiva/sclera: Conjunctivae normal.      Pupils: Pupils are equal, round, and reactive to light.   Neck:      Vascular: No carotid bruit.   Neurological:      Mental Status: She is oriented to person, place, and  time.      Coordination: Finger-Nose-Finger Test and Heel to Shin Test normal.      Gait: Gait is intact.      Deep Tendon Reflexes:      Reflex Scores:       Tricep reflexes are 2+ on the right side and 2+ on the left side.       Bicep reflexes are 2+ on the right side and 2+ on the left side.       Brachioradialis reflexes are 2+ on the right side and 2+ on the left side.       Patellar reflexes are 2+ on the right side and 2+ on the left side.       Achilles reflexes are 2+ on the right side and 2+ on the left side.  Psychiatric:         Speech: Speech normal.       Neurologic Exam     Mental Status   Oriented to person, place, and time.   Registration of memory: Good recent and remote memory.   Attention: normal. Concentration: normal.   Speech: speech is normal   Level of consciousness: alert  Knowledge: consistent with education.     Cranial Nerves     CN II   Visual fields full to confrontation.   Visual acuity: normal    CN III, IV, VI   Pupils are equal, round, and reactive to light.  Extraocular motions are normal.     CN V   Facial sensation intact.   Right corneal reflex: normal  Left corneal reflex: normal    CN VII   Facial expression full, symmetric.   Right facial weakness: none  Left facial weakness: none    CN VIII   Hearing: intact    CN IX, X   Palate: symmetric    CN XI   Right sternocleidomastoid strength: normal  Left sternocleidomastoid strength: normal    CN XII   Tongue: not atrophic  Tongue deviation: none    Motor Exam   Muscle bulk: normal  Right arm tone: normal  Left arm tone: normal  Right leg tone: normal  Left leg tone: normal    Strength   Strength 5/5 except as noted.     Sensory Exam   Light touch normal.     Gait, Coordination, and Reflexes     Gait  Gait: normal    Coordination   Finger to nose coordination: normal  Heel to shin coordination: normal    Reflexes   Right brachioradialis: 2+  Left brachioradialis: 2+  Right biceps: 2+  Left biceps: 2+  Right triceps: 2+  Left  triceps: 2+  Right patellar: 2+  Left patellar: 2+  Right achilles: 2+  Left achilles: 2+  Right : 2+  Left : 2+          Assessment & Plan   Independent Review of Radiographic Studies:      I personally reviewed the images from the following studies.    I reviewed an MRI of the lumbar spine done in June 2020.  This did show a little disc bulge to the right at L4-5.  L5-S1 also showed a little bulging to the right.    Medical Decision Making:      I told the patient from my point of view I think we need to get new imaging of her cervical and her lumbar spine at this point.  We can then make determinations about where to go from there.    Diagnoses and all orders for this visit:    1. Neck pain (Primary)  -     MRI Cervical Spine Without Contrast; Future  -     XR Spine Cervical Complete With Flex Ext; Future    2. Chronic right-sided low back pain with right-sided sciatica  -     MRI Lumbar Spine Without Contrast; Future  -     XR Spine Lumbar Complete With Flex & Ext; Future      Return for After radiology test.

## 2024-08-13 ENCOUNTER — OFFICE VISIT (OUTPATIENT)
Dept: NEUROSURGERY | Facility: CLINIC | Age: 49
End: 2024-08-13
Payer: COMMERCIAL

## 2024-08-13 DIAGNOSIS — G89.29 CHRONIC RIGHT-SIDED LOW BACK PAIN WITH RIGHT-SIDED SCIATICA: ICD-10-CM

## 2024-08-13 DIAGNOSIS — M54.2 NECK PAIN: Primary | ICD-10-CM

## 2024-08-13 DIAGNOSIS — M54.41 CHRONIC RIGHT-SIDED LOW BACK PAIN WITH RIGHT-SIDED SCIATICA: ICD-10-CM

## 2024-08-13 PROCEDURE — 99204 OFFICE O/P NEW MOD 45 MIN: CPT | Performed by: NEUROLOGICAL SURGERY

## 2024-08-14 ENCOUNTER — PATIENT ROUNDING (BHMG ONLY) (OUTPATIENT)
Dept: NEUROSURGERY | Facility: CLINIC | Age: 49
End: 2024-08-14
Payer: COMMERCIAL

## 2024-08-15 DIAGNOSIS — Z86.59 HISTORY OF ANXIETY: Primary | ICD-10-CM

## 2024-08-15 RX ORDER — ALPRAZOLAM 0.5 MG/1
TABLET ORAL
Qty: 3 TABLET | Refills: 0 | Status: SHIPPED | OUTPATIENT
Start: 2024-08-15

## 2024-08-15 NOTE — TELEPHONE ENCOUNTER
Patient wanted to check on a prescription that Dr. Conroy was suppose to call in for her prior to her having her MRI on 09/14/24(patient mentioned Zanax). Please contact patient once prescription has been sent, thanks!

## 2024-09-12 DIAGNOSIS — F90.9 ATTENTION DEFICIT HYPERACTIVITY DISORDER (ADHD), UNSPECIFIED ADHD TYPE: ICD-10-CM

## 2024-09-13 RX ORDER — DEXTROAMPHETAMINE SACCHARATE, AMPHETAMINE ASPARTATE, DEXTROAMPHETAMINE SULFATE AND AMPHETAMINE SULFATE 3.75; 3.75; 3.75; 3.75 MG/1; MG/1; MG/1; MG/1
15 TABLET ORAL 2 TIMES DAILY
Qty: 60 TABLET | Refills: 0 | Status: SHIPPED | OUTPATIENT
Start: 2024-09-13

## 2024-09-14 ENCOUNTER — HOSPITAL ENCOUNTER (OUTPATIENT)
Dept: MRI IMAGING | Facility: HOSPITAL | Age: 49
Discharge: HOME | End: 2024-09-14
Payer: COMMERCIAL

## 2024-09-14 ENCOUNTER — HOSPITAL ENCOUNTER (OUTPATIENT)
Dept: GENERAL RADIOLOGY | Facility: HOSPITAL | Age: 49
Discharge: HOME | End: 2024-09-14
Payer: COMMERCIAL

## 2024-09-14 DIAGNOSIS — G89.29 CHRONIC RIGHT-SIDED LOW BACK PAIN WITH RIGHT-SIDED SCIATICA: ICD-10-CM

## 2024-09-14 DIAGNOSIS — M54.41 CHRONIC RIGHT-SIDED LOW BACK PAIN WITH RIGHT-SIDED SCIATICA: ICD-10-CM

## 2024-09-14 DIAGNOSIS — M54.2 NECK PAIN: ICD-10-CM

## 2024-09-14 PROCEDURE — 72052 X-RAY EXAM NECK SPINE 6/>VWS: CPT

## 2024-09-14 PROCEDURE — 72114 X-RAY EXAM L-S SPINE BENDING: CPT

## 2024-09-14 PROCEDURE — 72141 MRI NECK SPINE W/O DYE: CPT

## 2024-09-14 PROCEDURE — 72148 MRI LUMBAR SPINE W/O DYE: CPT

## 2024-09-16 ENCOUNTER — TELEPHONE (OUTPATIENT)
Dept: NEUROSURGERY | Facility: CLINIC | Age: 49
End: 2024-09-16
Payer: COMMERCIAL

## 2024-09-19 DIAGNOSIS — I10 HYPERTENSION, UNSPECIFIED TYPE: ICD-10-CM

## 2024-09-19 RX ORDER — VALSARTAN 160 MG/1
160 TABLET ORAL DAILY
Qty: 90 TABLET | Refills: 0 | Status: SHIPPED | OUTPATIENT
Start: 2024-09-19

## 2024-09-19 RX ORDER — METHYLPREDNISOLONE 4 MG
TABLET, DOSE PACK ORAL
Qty: 21 TABLET | Refills: 0 | Status: SHIPPED | OUTPATIENT
Start: 2024-09-19 | End: 2024-09-20 | Stop reason: SDUPTHER

## 2024-09-20 ENCOUNTER — OFFICE VISIT (OUTPATIENT)
Dept: NEUROSURGERY | Facility: CLINIC | Age: 49
End: 2024-09-20
Payer: COMMERCIAL

## 2024-09-20 DIAGNOSIS — G89.29 CHRONIC RIGHT-SIDED LOW BACK PAIN WITH RIGHT-SIDED SCIATICA: Primary | ICD-10-CM

## 2024-09-20 DIAGNOSIS — M54.2 NECK PAIN: ICD-10-CM

## 2024-09-20 DIAGNOSIS — M54.41 CHRONIC RIGHT-SIDED LOW BACK PAIN WITH RIGHT-SIDED SCIATICA: Primary | ICD-10-CM

## 2024-09-20 PROCEDURE — 99214 OFFICE O/P EST MOD 30 MIN: CPT | Performed by: NEUROLOGICAL SURGERY

## 2024-09-20 NOTE — H&P (VIEW-ONLY)
Subjective   Patient ID: Radha Robles is a 49 y.o. female is here today for follow-up with new MRI/XRs C/L-spine done on 9/14/2024.    Today patient states that she has neck pain, and low back,  R arm pain, and R leg. Patient is also having numbness in the R leg    History of Present Illness    This patient returns today.  She continues with pain in her neck with numbness and tingling and pain down her right arm.  She also has pain in the right side of her lower back and down her right leg.  She does not have a lot of trouble with her left side.  She has had epidural blocks last year.  She has no other associated symptoms.  Since she was here last the pain in her lower back and her right leg has gotten a lot worse.  She is unable to work at this point.    The following portions of the patient's history were reviewed and updated as appropriate: allergies, current medications, past family history, past medical history, past social history, past surgical history, and problem list.    Review of Systems   Constitutional:  Negative for chills and fever.   HENT:  Negative for congestion.    Musculoskeletal:  Positive for back pain, myalgias and neck pain.   Neurological:  Positive for weakness, numbness and headaches.       This    Objective     There were no vitals filed for this visit.  There is no height or weight on file to calculate BMI.    Tobacco Use: Low Risk  (9/24/2024)    Patient History     Smoking Tobacco Use: Never     Smokeless Tobacco Use: Never     Passive Exposure: Never          Physical Exam  Neurological:      Mental Status: She is alert and oriented to person, place, and time.       Neurologic Exam     Mental Status   Oriented to person, place, and time.           Assessment & Plan   Independent Review of Radiographic Studies:      I personally reviewed the images from the following studies.    I reviewed her plain films and MRI.  This is in the cervical and the lumbar spine.  In the lumbar  spine there is a grade 1 spondylolisthesis of L4 on L5.  There is minimal degenerative change.  I do not see any abnormal movement on flexion and extension.  In the cervical spine there is a little more degenerative disease but again no abnormal movement on flexion and extension.  On the lumbar MRI there is some bulging at L3-4-5.  On the axial images T12-L1 looks okay.  L1-2 looks okay as well.  L2-3 is open.  L3-4 is open as well.  There may be a hint of foraminal narrowing on the left at that level.  L4-5 shows some bilateral lateral recess stenosis secondary to the spondylolisthesis.  L5-S1 mostly looks okay.  On the cervical MRI C2-3 is widely open.  C3-4 shows a little flattening of the cord but not severe C4-5 a little more so.  Both those levels show widely patent foramina.  C5-6 shows more significant cord flattening and maybe even a little cord compression with bilateral foraminal narrowing.  C6-7 overall looks okay.  C7-T1 looks okay as well.    Medical Decision Making:      I told the patient I think the  next step would be to do a selective nerve root injection on the right side at L4-5.  If this helps her pain then we at least know what the problem is coming from that nerve.  I told her to call me when she has a selective nerve root injection done.  We will then decide where to go from there.  I do not think she can return to work till she has the injection.  After this visit the patient went for her selective nerve root injection.  It did help and so I think we should proceed with a cervical and lumbar myelogram.    Diagnoses and all orders for this visit:    1. Chronic right-sided low back pain with right-sided sciatica (Primary)  -     Cancel: Selective Nerve Root Injection  -     Obtain Informed Consent; Standing  -     IR Myelogram 2 or More Areas; Future  -     CT Cervical Spine With Intrathecal Contrast; Future  -     CT Lumbar Spine With Intrathecal Contrast; Future  -     XR Spine Cervical  Complete With Flex Ext; Future  -     XR Spine Lumbar Complete With Flex & Ext; Future  -     No Lab Testing Needed; Standing  -     dexAMETHasone (DECADRON) 4 MG tablet; Take 2 tablets by mouth Take As Directed. Take both tablets by mouth 2 hours before myelogram  Dispense: 2 tablet; Refill: 0    2. Neck pain  -     Obtain Informed Consent; Standing  -     IR Myelogram 2 or More Areas; Future  -     CT Cervical Spine With Intrathecal Contrast; Future  -     CT Lumbar Spine With Intrathecal Contrast; Future  -     XR Spine Cervical Complete With Flex Ext; Future  -     XR Spine Lumbar Complete With Flex & Ext; Future  -     No Lab Testing Needed; Standing  -     dexAMETHasone (DECADRON) 4 MG tablet; Take 2 tablets by mouth Take As Directed. Take both tablets by mouth 2 hours before myelogram  Dispense: 2 tablet; Refill: 0      Return for Recheck and call after treatment or consultation.

## 2024-09-23 DIAGNOSIS — G89.29 CHRONIC RIGHT-SIDED LOW BACK PAIN WITH RIGHT-SIDED SCIATICA: Primary | ICD-10-CM

## 2024-09-23 DIAGNOSIS — M54.41 CHRONIC RIGHT-SIDED LOW BACK PAIN WITH RIGHT-SIDED SCIATICA: Primary | ICD-10-CM

## 2024-09-24 ENCOUNTER — ANESTHESIA (OUTPATIENT)
Dept: PAIN MEDICINE | Facility: HOSPITAL | Age: 49
End: 2024-09-24
Payer: COMMERCIAL

## 2024-09-24 ENCOUNTER — HOSPITAL ENCOUNTER (OUTPATIENT)
Dept: GENERAL RADIOLOGY | Facility: HOSPITAL | Age: 49
Discharge: HOME | End: 2024-09-24
Payer: COMMERCIAL

## 2024-09-24 ENCOUNTER — ANESTHESIA EVENT (OUTPATIENT)
Dept: PAIN MEDICINE | Facility: HOSPITAL | Age: 49
End: 2024-09-24
Payer: COMMERCIAL

## 2024-09-24 ENCOUNTER — HOSPITAL ENCOUNTER (OUTPATIENT)
Dept: PAIN MEDICINE | Facility: HOSPITAL | Age: 49
Discharge: HOME | End: 2024-09-24
Payer: COMMERCIAL

## 2024-09-24 VITALS
BODY MASS INDEX: 28.79 KG/M2 | HEIGHT: 68 IN | WEIGHT: 190 LBS | SYSTOLIC BLOOD PRESSURE: 123 MMHG | DIASTOLIC BLOOD PRESSURE: 81 MMHG | OXYGEN SATURATION: 95 % | HEART RATE: 78 BPM | RESPIRATION RATE: 16 BRPM | TEMPERATURE: 97.1 F

## 2024-09-24 DIAGNOSIS — G89.29 CHRONIC RIGHT-SIDED LOW BACK PAIN WITH RIGHT-SIDED SCIATICA: ICD-10-CM

## 2024-09-24 DIAGNOSIS — M54.41 CHRONIC RIGHT-SIDED LOW BACK PAIN WITH RIGHT-SIDED SCIATICA: ICD-10-CM

## 2024-09-24 DIAGNOSIS — R52 PAIN: ICD-10-CM

## 2024-09-24 PROCEDURE — 25010000002 DEXAMETHASONE SODIUM PHOSPHATE 10 MG/ML SOLUTION: Performed by: ANESTHESIOLOGY

## 2024-09-24 PROCEDURE — 25010000002 MIDAZOLAM PER 1 MG: Performed by: ANESTHESIOLOGY

## 2024-09-24 PROCEDURE — 25510000001 IOPAMIDOL 41 % SOLUTION: Performed by: ANESTHESIOLOGY

## 2024-09-24 PROCEDURE — 25010000002 BUPIVACAINE (PF) 0.25 % SOLUTION: Performed by: ANESTHESIOLOGY

## 2024-09-24 PROCEDURE — 77003 FLUOROGUIDE FOR SPINE INJECT: CPT

## 2024-09-24 RX ORDER — DEXAMETHASONE SODIUM PHOSPHATE 10 MG/ML
10 INJECTION, SOLUTION INTRAMUSCULAR; INTRAVENOUS ONCE
Status: COMPLETED | OUTPATIENT
Start: 2024-09-24 | End: 2024-09-24

## 2024-09-24 RX ORDER — BUPIVACAINE HYDROCHLORIDE 2.5 MG/ML
10 INJECTION, SOLUTION EPIDURAL; INFILTRATION; INTRACAUDAL ONCE
Status: COMPLETED | OUTPATIENT
Start: 2024-09-24 | End: 2024-09-24

## 2024-09-24 RX ORDER — FENTANYL CITRATE 50 UG/ML
50 INJECTION, SOLUTION INTRAMUSCULAR; INTRAVENOUS ONCE
Status: DISCONTINUED | OUTPATIENT
Start: 2024-09-24 | End: 2024-09-25 | Stop reason: HOSPADM

## 2024-09-24 RX ORDER — SODIUM CHLORIDE 0.9 % (FLUSH) 0.9 %
10 SYRINGE (ML) INJECTION AS NEEDED
Status: DISCONTINUED | OUTPATIENT
Start: 2024-09-24 | End: 2024-09-25 | Stop reason: HOSPADM

## 2024-09-24 RX ORDER — MIDAZOLAM HYDROCHLORIDE 1 MG/ML
1 INJECTION INTRAMUSCULAR; INTRAVENOUS ONCE AS NEEDED
Status: COMPLETED | OUTPATIENT
Start: 2024-09-24 | End: 2024-09-24

## 2024-09-24 RX ORDER — SODIUM CHLORIDE 0.9 % (FLUSH) 0.9 %
10 SYRINGE (ML) INJECTION EVERY 12 HOURS SCHEDULED
Status: DISCONTINUED | OUTPATIENT
Start: 2024-09-24 | End: 2024-09-25 | Stop reason: HOSPADM

## 2024-09-24 RX ORDER — SODIUM CHLORIDE 9 MG/ML
40 INJECTION, SOLUTION INTRAVENOUS AS NEEDED
Status: DISCONTINUED | OUTPATIENT
Start: 2024-09-24 | End: 2024-09-25 | Stop reason: HOSPADM

## 2024-09-24 RX ORDER — LIDOCAINE HYDROCHLORIDE 10 MG/ML
1 INJECTION, SOLUTION INFILTRATION; PERINEURAL ONCE
Status: DISCONTINUED | OUTPATIENT
Start: 2024-09-24 | End: 2024-09-25 | Stop reason: HOSPADM

## 2024-09-24 RX ORDER — IOPAMIDOL 408 MG/ML
10 INJECTION, SOLUTION INTRATHECAL
Status: COMPLETED | OUTPATIENT
Start: 2024-09-24 | End: 2024-09-24

## 2024-09-24 RX ADMIN — MIDAZOLAM 2 MG: 1 INJECTION INTRAMUSCULAR; INTRAVENOUS at 10:42

## 2024-09-24 RX ADMIN — BUPIVACAINE HYDROCHLORIDE 10 ML: 2.5 INJECTION, SOLUTION EPIDURAL; INFILTRATION; INTRACAUDAL; PERINEURAL at 10:43

## 2024-09-24 RX ADMIN — IOPAMIDOL 10 ML: 408 INJECTION, SOLUTION INTRATHECAL at 10:43

## 2024-09-24 RX ADMIN — DEXAMETHASONE SODIUM PHOSPHATE 10 MG: 10 INJECTION INTRAMUSCULAR; INTRAVENOUS at 10:43

## 2024-10-01 ENCOUNTER — TELEPHONE (OUTPATIENT)
Dept: NEUROSURGERY | Facility: CLINIC | Age: 49
End: 2024-10-01

## 2024-10-01 NOTE — TELEPHONE ENCOUNTER
Received incoming fax from [employer] requesting FMLA forms to be filled out and faxed back. The forms will be in the folder of Dr. Conroy for further review, thanks!

## 2024-10-03 ENCOUNTER — TELEPHONE (OUTPATIENT)
Dept: NEUROSURGERY | Facility: CLINIC | Age: 49
End: 2024-10-03
Payer: COMMERCIAL

## 2024-10-08 ENCOUNTER — OFFICE VISIT (OUTPATIENT)
Dept: NEUROSURGERY | Facility: CLINIC | Age: 49
End: 2024-10-08
Payer: COMMERCIAL

## 2024-10-08 ENCOUNTER — HOSPITAL ENCOUNTER (OUTPATIENT)
Dept: CT IMAGING | Facility: HOSPITAL | Age: 49
Discharge: HOME | End: 2024-10-08
Payer: COMMERCIAL

## 2024-10-08 ENCOUNTER — HOSPITAL ENCOUNTER (OUTPATIENT)
Dept: GENERAL RADIOLOGY | Facility: HOSPITAL | Age: 49
Discharge: HOME | End: 2024-10-08
Payer: COMMERCIAL

## 2024-10-08 ENCOUNTER — PREP FOR SURGERY (OUTPATIENT)
Dept: OTHER | Facility: HOSPITAL | Age: 49
End: 2024-10-08
Payer: COMMERCIAL

## 2024-10-08 VITALS
RESPIRATION RATE: 18 BRPM | HEART RATE: 88 BPM | WEIGHT: 190 LBS | TEMPERATURE: 97.3 F | HEIGHT: 68 IN | SYSTOLIC BLOOD PRESSURE: 115 MMHG | DIASTOLIC BLOOD PRESSURE: 77 MMHG | OXYGEN SATURATION: 96 % | BODY MASS INDEX: 28.79 KG/M2

## 2024-10-08 DIAGNOSIS — M54.41 CHRONIC RIGHT-SIDED LOW BACK PAIN WITH RIGHT-SIDED SCIATICA: Primary | ICD-10-CM

## 2024-10-08 DIAGNOSIS — G89.29 CHRONIC RIGHT-SIDED LOW BACK PAIN WITH RIGHT-SIDED SCIATICA: ICD-10-CM

## 2024-10-08 DIAGNOSIS — G89.29 CHRONIC RIGHT-SIDED LOW BACK PAIN WITH RIGHT-SIDED SCIATICA: Primary | ICD-10-CM

## 2024-10-08 DIAGNOSIS — M54.41 CHRONIC RIGHT-SIDED LOW BACK PAIN WITH RIGHT-SIDED SCIATICA: ICD-10-CM

## 2024-10-08 DIAGNOSIS — M54.2 NECK PAIN: ICD-10-CM

## 2024-10-08 PROCEDURE — 99214 OFFICE O/P EST MOD 30 MIN: CPT | Performed by: NEUROLOGICAL SURGERY

## 2024-10-08 PROCEDURE — 72126 CT NECK SPINE W/DYE: CPT

## 2024-10-08 PROCEDURE — 72132 CT LUMBAR SPINE W/DYE: CPT

## 2024-10-08 PROCEDURE — 25510000001 IOPAMIDOL 61 % SOLUTION: Performed by: NEUROLOGICAL SURGERY

## 2024-10-08 PROCEDURE — 72240 MYELOGRAPHY NECK SPINE: CPT

## 2024-10-08 PROCEDURE — 25010000002 LIDOCAINE 1 % SOLUTION: Performed by: NEUROLOGICAL SURGERY

## 2024-10-08 PROCEDURE — 72052 X-RAY EXAM NECK SPINE 6/>VWS: CPT

## 2024-10-08 PROCEDURE — 72114 X-RAY EXAM L-S SPINE BENDING: CPT

## 2024-10-08 PROCEDURE — 62305 MYELOGRAPHY LUMBAR INJECTION: CPT

## 2024-10-08 RX ORDER — ACETAMINOPHEN 325 MG/1
650 TABLET ORAL EVERY 4 HOURS PRN
Status: DISCONTINUED | OUTPATIENT
Start: 2024-10-08 | End: 2024-10-09 | Stop reason: HOSPADM

## 2024-10-08 RX ORDER — IOPAMIDOL 612 MG/ML
15 INJECTION, SOLUTION INTRATHECAL
Status: COMPLETED | OUTPATIENT
Start: 2024-10-08 | End: 2024-10-08

## 2024-10-08 RX ORDER — HYDROCODONE BITARTRATE AND ACETAMINOPHEN 5; 325 MG/1; MG/1
1 TABLET ORAL EVERY 4 HOURS PRN
Status: DISCONTINUED | OUTPATIENT
Start: 2024-10-08 | End: 2024-10-09 | Stop reason: HOSPADM

## 2024-10-08 RX ORDER — DIPHENHYDRAMINE HCL 25 MG
25 CAPSULE ORAL EVERY 6 HOURS PRN
COMMUNITY

## 2024-10-08 RX ORDER — LIDOCAINE HYDROCHLORIDE 10 MG/ML
10 INJECTION, SOLUTION INFILTRATION; PERINEURAL ONCE
Status: COMPLETED | OUTPATIENT
Start: 2024-10-08 | End: 2024-10-08

## 2024-10-08 RX ADMIN — IOPAMIDOL 15 ML: 612 INJECTION, SOLUTION INTRATHECAL at 07:09

## 2024-10-08 RX ADMIN — LIDOCAINE HYDROCHLORIDE 8 ML: 10 INJECTION, SOLUTION INFILTRATION; PERINEURAL at 07:07

## 2024-10-08 NOTE — NURSING NOTE
0907   No Signs/Symptoms of distress noted. Patient taken by wheelchair to Dr. Conroy office and taken to room to lie down.

## 2024-10-08 NOTE — POST-PROCEDURE NOTE
Preop diagnosis:  Cervical radiculopathy and Lumbar radiculopathy  Postop diagnosis:  same  LP at L23  10 mL of 300M Isovue  Complications: none  EBL: 0 mL  Specimens: none

## 2024-10-08 NOTE — DISCHARGE INSTRUCTIONS
EDUCATION /DISCHARGE INSTRUCTIONS:    A myelogram is a special radiology procedure of the spinal cord, spinal nerves and other related structures.  You will be awake during the examination.  An area of your lower back will be cleansed with an antiseptic solution.  The physician will inject a numbing medication in your lower back.  While your back is numb, a needle will be placed in the lower back area.  A small amount of spinal fluid may be withdrawn and sent to the lab if ordered by your physician. While the needle is in the back, an injection of a contrast material (xray dye) will be given through the needle.  The contrast material will allow the physician to see the spinal cord and spinal nerves.  Once injected, the needle will be removed and a band aid will be placed over the injection site.  The table will be tilted during the process to allow the contrast material to flow to particular areas in the spine.  Following the injection and xrays, you will be taken to the CT scanner where more pictures will be taken. After the procedure is finished, the contrast material will be absorbed by your body and eliminated through your kidneys.  The radiologist will study and interpret your myelogram and send the results to your physician.  Procedure risks of a myelogram include, but are not limited to:  *  Bleeding   *  Seizure  *  Infection   *  Headache, possibly severe requiring a blood patch  *  Contrast reaction  *  Nerve or cord injury  *  Paralysis and death    Benefits of the procedure:  *  Best examination for delineating pathology related to spinal cord compression from a disc and/or nerve root compression  Alternatives to the procedure:  MRI - a non invasive procedure requiring intravenous contrast injection.  Cannot be done on patients with certain pacemakers or metal in the body.  MRI risks include possible reaction to the contrast material, movement of metal located in the body.Benefit to MRI:  Non-invasive  and usually painless procedure.  THIS EDUCATION INFORMATION WAS REVIEWED PRIOR TO PROCEDURE AND CONSENT.     24 hour rest period ends ________Wednesday 10/9/24 10:00____________.  Important information following your myelogram:  * ACTIVITY:   *  You may sit up in the car to go home.  *  When you get home, remain on bed rest (flat on your back or on your side) for 24 hours. You may place a rolled up towel under your neck for support  * You may get up to the bathroom and sit up to eat and drink then lie back down  * Drink additional fluids for 24 hours after the myelogram.   * Continue to drink additional fluids for the next 2-3 days. Water and caffeinated beverages are encouraged.  * Remain less active for the next two to three days.  * Do not drive for 24 hours following a myelogram.  * You may remove the bandage and shower in the morning.    CALL YOUR PHYSICIAN FOR THE FOLLOWING:  * Pain at the injection site  * Redness, swelling, bruising or drainage at the injection site.  * A fever by mouth of 101.0 or any new symptoms  Headaches are a common side effect after a myelogram.  If you get a headache, you should stay flat in bed and drink plenty of fluids. If the headache persists and does not go away with rest/medication,   CALL Dr. Conroy at [phone]

## 2024-10-08 NOTE — H&P (VIEW-ONLY)
Subjective   Patient ID: Radha Robles is a 49 y.o. female is here today for follow-up with a new Total Myelogram done today.    Today patient's symptoms are unchanged     History of Present Illness    This patient returns today.  She still has pain in her neck and numbness and tingling down her right arm.  She has pain on the right side of her lower back with radiation down her right leg.  The left side is overall okay.  Over time her back and right leg have gotten a lot worse.    The following portions of the patient's history were reviewed and updated as appropriate: allergies, current medications, past family history, past medical history, past social history, past surgical history, and problem list.    Review of Systems   Constitutional:  Negative for chills and fever.   HENT:  Negative for congestion.    Genitourinary:  Negative for difficulty urinating and dysuria.   Musculoskeletal:  Positive for back pain, myalgias and neck pain.   Neurological:  Positive for weakness and numbness.       I reviewed the review of systems listed by the patient and discussed by my MA    Objective     There were no vitals filed for this visit.  There is no height or weight on file to calculate BMI.    Tobacco Use: Low Risk  (10/8/2024)    Patient History     Smoking Tobacco Use: Never     Smokeless Tobacco Use: Never     Passive Exposure: Never          Physical Exam  Neurological:      Mental Status: She is alert and oriented to person, place, and time.       Neurologic Exam     Mental Status   Oriented to person, place, and time.           Assessment & Plan   Independent Review of Radiographic Studies:      I personally reviewed the images from the following studies.    I reviewed her plain films, myelogram, and CT scan in the cervical and lumbar spine which were done today.  The plain films have been read.  This shows multilevel degenerative disease but no evidence of instability on flexion and extension.  In the lumbar  spine there is a grade 1 spondylolisthesis of L4 on L5.  This does seem to be fairly stable in flexion and extension.  On the myelogram itself the nerves appear to fill out pretty well.  There is initially however a subtotal block at the L4-5 level.  Ultimately the contrast did pass that level and the nerves fill out pretty well.  There may be just a hint of some bulging against the thecal sac on the right side at that level on the standing films.  There is definitely a spondylolisthesis at that level.  In the cervical spine the nerves appear to fill out pretty well.  There is a little bit of pressure on the nerve on the right at C5-6 on 1 view and also at C3-4.  On the post myelographic CT scan in the cervical spine C2-3 and C3-4 are widely open.  C4-5 is open as well.  I do not see a correlate on the CT for the nerve root compression at C3-4 on the right.  At C5-6 there is fairly severe degenerative disease with some flattening of the cord and the thecal sac and fairly significant foraminal stenosis on both sides worse on the left than the right however.  C6-7 is more open although there is a little narrowing there also.  C7 and T1 shows a right sided disc protrusion that could be irritating the nerve at that level.  In the lumbar spine the lower thoracic spine down to L3 looks okay.  L3-4 shows some central disc bulging but no compression of the neural elements.  L4-5 shows some lateral recess narrowing about equal.  L5-S1 mostly looks okay.    She did have a selective nerve root injection on the right side at L4-5 and this did help quite a bit for a short period of time.    Medical Decision Making:      I did very long discussion with the patient about the situation.  Explained the findings on the imaging.  I told her I would not recommend any surgery of her cervical spine.  I do think we could consider surgery in her lumbar spine primarily because of the combination of imaging and clinical data.  Because there  is no movement on flexion and extension I would not do a fusion.  I would just do a decompression.  I told the patient about the risks, complications and expected outcome of the lumbar surgery.  I explained that there was an 7 understand that her chances of a good result are reduced and her chances of being worse are 0% chance of getting rid of the pain in the leg.  I explained that there would still be back pain after the surgery.  Initially this will be quite severe but will improve over time.  There is a 2 or 3% chance of infection, bleeding, CSF leak, damage to the nerve as a result of surgery, paralysis, as well as anesthetic risk.  There is a 5% chance of late instability which could require a fusion later on and a 10% chance of recurrent disc herniation.  We discussed the postoperative hospital and home course.  Patient does a shade higher than usual as well.    She would like to proceed and will need to be scheduled for a: Right lumbar 4 to lumbar 5 laminectomy with metrx    Diagnoses and all orders for this visit:    1. Chronic right-sided low back pain with right-sided sciatica (Primary)      Return for 2-3 week post op.

## 2024-10-10 ENCOUNTER — PRE-ADMISSION TESTING (OUTPATIENT)
Dept: PREADMISSION TESTING | Facility: HOSPITAL | Age: 49
End: 2024-10-10
Payer: COMMERCIAL

## 2024-10-10 VITALS
DIASTOLIC BLOOD PRESSURE: 83 MMHG | RESPIRATION RATE: 16 BRPM | HEART RATE: 76 BPM | HEIGHT: 67 IN | SYSTOLIC BLOOD PRESSURE: 123 MMHG | OXYGEN SATURATION: 98 % | WEIGHT: 191.3 LBS | BODY MASS INDEX: 30.02 KG/M2 | TEMPERATURE: 98.9 F

## 2024-10-10 DIAGNOSIS — F90.9 ATTENTION DEFICIT HYPERACTIVITY DISORDER (ADHD), UNSPECIFIED ADHD TYPE: ICD-10-CM

## 2024-10-10 LAB
ANION GAP SERPL CALCULATED.3IONS-SCNC: 9 MMOL/L (ref 5–15)
BUN SERPL-MCNC: 22 MG/DL (ref 6–20)
BUN/CREAT SERPL: 21 (ref 7–25)
CALCIUM SPEC-SCNC: 10.4 MG/DL (ref 8.6–10.5)
CHLORIDE SERPL-SCNC: 102 MMOL/L (ref 98–107)
CO2 SERPL-SCNC: 28 MMOL/L (ref 22–29)
CREAT SERPL-MCNC: 1.05 MG/DL (ref 0.57–1)
DEPRECATED RDW RBC AUTO: 37.7 FL (ref 37–54)
EGFRCR SERPLBLD CKD-EPI 2021: 64.5 ML/MIN/1.73
ERYTHROCYTE [DISTWIDTH] IN BLOOD BY AUTOMATED COUNT: 11.9 % (ref 12.3–15.4)
GLUCOSE SERPL-MCNC: 80 MG/DL (ref 65–99)
HCT VFR BLD AUTO: 38.6 % (ref 34–46.6)
HGB BLD-MCNC: 12.9 G/DL (ref 12–15.9)
MCH RBC QN AUTO: 28.9 PG (ref 26.6–33)
MCHC RBC AUTO-ENTMCNC: 33.4 G/DL (ref 31.5–35.7)
MCV RBC AUTO: 86.5 FL (ref 79–97)
PLATELET # BLD AUTO: 391 10*3/MM3 (ref 140–450)
PMV BLD AUTO: 8.8 FL (ref 6–12)
POTASSIUM SERPL-SCNC: 4.2 MMOL/L (ref 3.5–5.2)
RBC # BLD AUTO: 4.46 10*6/MM3 (ref 3.77–5.28)
SODIUM SERPL-SCNC: 139 MMOL/L (ref 136–145)
WBC NRBC COR # BLD AUTO: 7.81 10*3/MM3 (ref 3.4–10.8)

## 2024-10-10 PROCEDURE — 80048 BASIC METABOLIC PNL TOTAL CA: CPT

## 2024-10-10 PROCEDURE — 85027 COMPLETE CBC AUTOMATED: CPT

## 2024-10-10 PROCEDURE — 36415 COLL VENOUS BLD VENIPUNCTURE: CPT

## 2024-10-10 RX ORDER — DEXTROAMPHETAMINE SACCHARATE, AMPHETAMINE ASPARTATE, DEXTROAMPHETAMINE SULFATE AND AMPHETAMINE SULFATE 3.75; 3.75; 3.75; 3.75 MG/1; MG/1; MG/1; MG/1
15 TABLET ORAL 2 TIMES DAILY
Qty: 60 TABLET | Refills: 0 | Status: SHIPPED | OUTPATIENT
Start: 2024-10-14

## 2024-10-10 NOTE — DISCHARGE INSTRUCTIONS
Take the following medications the morning of surgery: HOLD VALSARTAN 24 HOURS PRIOR TO SURGERY      If you are on prescription narcotic pain medication to control your pain you may also take that medication the morning of surgery.      General Instructions:     Do not eat solid food after midnight the night before surgery.  Clear liquids day of surgery are allowed but must be stopped at least two hours before your hospital arrival time.       Allowed clear liquids      Water, sodas, and tea or coffee with no cream or milk added.       12 to 20 ounces of a clear liquid that contains carbohydrates is recommended.  If non-diabetic, have Gatorade or Powerade.  If diabetic, have G2 or Powerade Zero.     Do not have liquids red in color.  Do not consume chicken, beef, pork or vegetable broth or bouillon cubes of any variety as they are not considered clear liquids and are not allowed.  Bring any papers given to you in the doctor’s office.  Wear clean comfortable clothes.  Do not wear contact lenses, false eyelashes or make-up.  Bring a case for your glasses.   Remove all piercings.  Leave jewelry and any other valuables at home.  The Pre-Admission Testing nurse will instruct you to bring medications if unable to obtain an accurate list in Pre-Admission Testing.            Preventing a Surgical Site Infection:  For 2 to 3 days before surgery, avoid shaving with a razor because the razor can irritate skin and make it easier to develop an infection.    Any areas of open skin can increase the risk of a post-operative wound infection by allowing bacteria to enter and travel throughout the body.  Notify your surgeon if you have any skin wounds / rashes even if it is not near the expected surgical site.  The area will need assessed to determine if surgery should be delayed until it is healed.  The night prior to surgery shower using a fresh bar of anti-bacterial soap (such as Dial) and clean washcloth.  Sleep in a clean bed  with clean clothing.  Do not allow pets to sleep with you.  Shower on the morning of surgery using a fresh bar of anti-bacterial soap (such as Dial) and clean washcloth.  Dry with a clean towel and dress in clean clothing.  Ask your surgeon if you will be receiving antibiotics prior to surgery.  Make sure you, your family, and all healthcare providers clean their hands with soap and water or an alcohol based hand  before caring for you or your wound.    Day of surgery:  Your arrival time is approximately two hours before your scheduled surgery time.  Please note if you have an early arrival time the surgery doors do not open before 5:00 AM.  Upon arrival, a Pre-op nurse and Anesthesiologist will review your health history, obtain vital signs, and answer questions you may have.  The only belongings needed at this time will be a list of your home medications and if applicable your C-PAP/BI-PAP machine.  A Pre-op nurse will start an IV and you may receive medication in preparation for surgery, including something to help you relax.     Please be aware that surgery does come with discomfort.  We want to make every effort to control your discomfort so please discuss any uncontrolled symptoms with your nurse.   Your doctor will most likely have prescribed pain medications.      If you are going home after surgery you will receive individualized written care instructions before being discharged.  A responsible adult must drive you to and from the hospital on the day of your surgery and ideally stay with you through the night.   .  Discharge prescriptions can be filled by the hospital pharmacy during regular pharmacy hours.  If you are having surgery late in the day/evening your prescription may be e-prescribed to your pharmacy.  Please verify your pharmacy hours or chose a 24 hour pharmacy to avoid not having access to your prescription because your pharmacy has closed for the day.    If you are staying overnight  following surgery, you will be transported to your hospital room following the recovery period.  [facility] has all private rooms.    If you have any questions please call Pre-Admission Testing at [phone].  Deductibles and co-payments are collected on the day of service. Please be prepared to pay the required co-pay, deductible or deposit on the day of service as defined by your plan.      CHLORHEXIDINE CLOTH INSTRUCTIONS  The morning of surgery follow these instructions using the Chlorhexidine cloths you've been given.  These steps reduce bacteria on the body.  Do not use the cloths near your eyes, ears mouth, genitalia or on open wounds.  Throw the cloths away after use but do not try to flush them down a toilet.      Open and remove one cloth at a time from the package.    Leave the cloth unfolded and begin the bathing.  Massage the skin with the cloths using gentle pressure to remove bacteria.  Do not scrub harshly.   Follow the steps below with one 2% CHG cloth per area (6 total cloths).  One cloth for neck, shoulders and chest.  One cloth for both arms, hands, fingers and underarms (do underarms last).  One cloth for the abdomen followed by groin.  One cloth for right leg and foot including between the toes.  One cloth for left leg and foot including between the toes.  The last cloth is to be used for the back of the neck, back and buttocks.    Allow the CHG to air dry 3 minutes on the skin which will give it time to work and decrease the chance of irritation.  The skin may feel sticky until it is dry.  Do not rinse with water or any other liquid or you will lose the beneficial effects of the CHG.  If mild skin irritation occurs, do rinse the skin to remove the CHG.  Report this to the nurse at time of admission.  Do not apply lotions, creams, ointments, deodorants or perfumes after using the clothes. Dress in clean clothes before coming to the hospital.    Call your surgeon immediately if  you experience any of the following symptoms:  Sore Throat  Shortness of Breath or difficulty breathing  Cough  Chills  Body soreness or muscle pain  Headache  Fever  New loss of taste or smell  Do not arrive for your surgery ill.  Your procedure will need to be rescheduled to another time.  You will need to call your physician before the day of surgery to avoid any unnecessary exposure to hospital staff as well as other patients.

## 2024-10-14 ENCOUNTER — HOSPITAL ENCOUNTER (OUTPATIENT)
Dept: MAMMOGRAPHY | Facility: HOSPITAL | Age: 49
Discharge: HOME | End: 2024-10-14
Payer: COMMERCIAL

## 2024-10-16 ENCOUNTER — ANESTHESIA (OUTPATIENT)
Dept: PERIOP | Facility: HOSPITAL | Age: 49
End: 2024-10-16
Payer: COMMERCIAL

## 2024-10-16 ENCOUNTER — ANESTHESIA EVENT (OUTPATIENT)
Dept: PERIOP | Facility: HOSPITAL | Age: 49
End: 2024-10-16
Payer: COMMERCIAL

## 2024-10-16 ENCOUNTER — APPOINTMENT (OUTPATIENT)
Dept: GENERAL RADIOLOGY | Facility: HOSPITAL | Age: 49
End: 2024-10-16
Payer: COMMERCIAL

## 2024-10-16 ENCOUNTER — HOSPITAL ENCOUNTER (OUTPATIENT)
Facility: HOSPITAL | Age: 49
Setting detail: HOSPITAL OUTPATIENT SURGERY
Discharge: HOME | End: 2024-10-16
Attending: NEUROLOGICAL SURGERY | Admitting: NEUROLOGICAL SURGERY
Payer: COMMERCIAL

## 2024-10-16 VITALS
DIASTOLIC BLOOD PRESSURE: 85 MMHG | RESPIRATION RATE: 16 BRPM | TEMPERATURE: 97.7 F | OXYGEN SATURATION: 96 % | HEART RATE: 92 BPM | SYSTOLIC BLOOD PRESSURE: 134 MMHG

## 2024-10-16 DIAGNOSIS — G89.29 CHRONIC RIGHT-SIDED LOW BACK PAIN WITH RIGHT-SIDED SCIATICA: ICD-10-CM

## 2024-10-16 DIAGNOSIS — M54.41 CHRONIC RIGHT-SIDED LOW BACK PAIN WITH RIGHT-SIDED SCIATICA: ICD-10-CM

## 2024-10-16 PROCEDURE — 63047 LAM FACETEC & FORAMOT LUMBAR: CPT | Performed by: NEUROLOGICAL SURGERY

## 2024-10-16 PROCEDURE — 25010000002 CEFAZOLIN PER 500 MG: Performed by: NEUROLOGICAL SURGERY

## 2024-10-16 PROCEDURE — 25010000002 LIDOCAINE 2% SOLUTION: Performed by: NURSE ANESTHETIST, CERTIFIED REGISTERED

## 2024-10-16 PROCEDURE — 25810000003 LACTATED RINGERS PER 1000 ML: Performed by: ANESTHESIOLOGY

## 2024-10-16 PROCEDURE — 25010000002 ONDANSETRON PER 1 MG: Performed by: NURSE ANESTHETIST, CERTIFIED REGISTERED

## 2024-10-16 PROCEDURE — 63047 LAM FACETEC & FORAMOT LUMBAR: CPT | Performed by: SPECIALIST/TECHNOLOGIST, OTHER

## 2024-10-16 PROCEDURE — 25010000002 PROPOFOL 200 MG/20ML EMULSION: Performed by: NURSE ANESTHETIST, CERTIFIED REGISTERED

## 2024-10-16 PROCEDURE — 25010000002 HYDROMORPHONE PER 4 MG: Performed by: NURSE ANESTHETIST, CERTIFIED REGISTERED

## 2024-10-16 PROCEDURE — 25010000002 FENTANYL CITRATE (PF) 50 MCG/ML SOLUTION: Performed by: NURSE ANESTHETIST, CERTIFIED REGISTERED

## 2024-10-16 PROCEDURE — 25010000002 SUGAMMADEX 200 MG/2ML SOLUTION: Performed by: NURSE ANESTHETIST, CERTIFIED REGISTERED

## 2024-10-16 PROCEDURE — 25810000003 SODIUM CHLORIDE PER 500 ML: Performed by: NEUROLOGICAL SURGERY

## 2024-10-16 PROCEDURE — C1713 ANCHOR/SCREW BN/BN,TIS/BN: HCPCS | Performed by: NEUROLOGICAL SURGERY

## 2024-10-16 PROCEDURE — 25010000002 VANCOMYCIN 1 G RECONSTITUTED SOLUTION 1 EACH VIAL: Performed by: NEUROLOGICAL SURGERY

## 2024-10-16 PROCEDURE — 25010000002 MAGNESIUM SULFATE PER 500 MG OF MAGNESIUM: Performed by: NURSE ANESTHETIST, CERTIFIED REGISTERED

## 2024-10-16 PROCEDURE — 76000 FLUOROSCOPY <1 HR PHYS/QHP: CPT

## 2024-10-16 PROCEDURE — 25010000002 DEXAMETHASONE SODIUM PHOSPHATE 20 MG/5ML SOLUTION: Performed by: NURSE ANESTHETIST, CERTIFIED REGISTERED

## 2024-10-16 DEVICE — SSC BONE WAX
Type: IMPLANTABLE DEVICE | Site: SPINE LUMBAR | Status: FUNCTIONAL
Brand: SSC BONE WAX

## 2024-10-16 DEVICE — FLOSEAL WITH RECOTHROM - 5ML
Type: IMPLANTABLE DEVICE | Site: SPINE LUMBAR | Status: FUNCTIONAL
Brand: FLOSEAL HEMOSTATIC MATRIX

## 2024-10-16 DEVICE — HEMOST ABS SURGIFOAM SZ100 8X12 10MM: Type: IMPLANTABLE DEVICE | Site: SPINE LUMBAR | Status: FUNCTIONAL

## 2024-10-16 RX ORDER — LIDOCAINE HYDROCHLORIDE 10 MG/ML
0.5 INJECTION, SOLUTION INFILTRATION; PERINEURAL ONCE AS NEEDED
Status: DISCONTINUED | OUTPATIENT
Start: 2024-10-16 | End: 2024-10-16 | Stop reason: HOSPADM

## 2024-10-16 RX ORDER — HYDROMORPHONE HYDROCHLORIDE 1 MG/ML
0.5 INJECTION, SOLUTION INTRAMUSCULAR; INTRAVENOUS; SUBCUTANEOUS
Status: DISCONTINUED | OUTPATIENT
Start: 2024-10-16 | End: 2024-10-16 | Stop reason: HOSPADM

## 2024-10-16 RX ORDER — SODIUM CHLORIDE, SODIUM LACTATE, POTASSIUM CHLORIDE, CALCIUM CHLORIDE 600; 310; 30; 20 MG/100ML; MG/100ML; MG/100ML; MG/100ML
9 INJECTION, SOLUTION INTRAVENOUS CONTINUOUS
Status: DISCONTINUED | OUTPATIENT
Start: 2024-10-16 | End: 2024-10-16 | Stop reason: HOSPADM

## 2024-10-16 RX ORDER — SODIUM CHLORIDE 9 MG/ML
INJECTION, SOLUTION INTRAVENOUS AS NEEDED
Status: DISCONTINUED | OUTPATIENT
Start: 2024-10-16 | End: 2024-10-16 | Stop reason: HOSPADM

## 2024-10-16 RX ORDER — SODIUM CHLORIDE 0.9 % (FLUSH) 0.9 %
3 SYRINGE (ML) INJECTION EVERY 12 HOURS SCHEDULED
Status: DISCONTINUED | OUTPATIENT
Start: 2024-10-16 | End: 2024-10-16 | Stop reason: HOSPADM

## 2024-10-16 RX ORDER — DROPERIDOL 2.5 MG/ML
0.62 INJECTION, SOLUTION INTRAMUSCULAR; INTRAVENOUS
Status: DISCONTINUED | OUTPATIENT
Start: 2024-10-16 | End: 2024-10-16 | Stop reason: HOSPADM

## 2024-10-16 RX ORDER — IPRATROPIUM BROMIDE AND ALBUTEROL SULFATE 2.5; .5 MG/3ML; MG/3ML
3 SOLUTION RESPIRATORY (INHALATION) ONCE AS NEEDED
Status: DISCONTINUED | OUTPATIENT
Start: 2024-10-16 | End: 2024-10-16 | Stop reason: HOSPADM

## 2024-10-16 RX ORDER — PROMETHAZINE HYDROCHLORIDE 25 MG/1
25 TABLET ORAL ONCE AS NEEDED
Status: DISCONTINUED | OUTPATIENT
Start: 2024-10-16 | End: 2024-10-16 | Stop reason: HOSPADM

## 2024-10-16 RX ORDER — FENTANYL CITRATE 50 UG/ML
50 INJECTION, SOLUTION INTRAMUSCULAR; INTRAVENOUS
Status: DISCONTINUED | OUTPATIENT
Start: 2024-10-16 | End: 2024-10-16 | Stop reason: HOSPADM

## 2024-10-16 RX ORDER — FAMOTIDINE 10 MG/ML
20 INJECTION, SOLUTION INTRAVENOUS ONCE
Status: COMPLETED | OUTPATIENT
Start: 2024-10-16 | End: 2024-10-16

## 2024-10-16 RX ORDER — PROPOFOL 10 MG/ML
INJECTION, EMULSION INTRAVENOUS AS NEEDED
Status: DISCONTINUED | OUTPATIENT
Start: 2024-10-16 | End: 2024-10-16 | Stop reason: SURG

## 2024-10-16 RX ORDER — FLUMAZENIL 0.1 MG/ML
0.2 INJECTION INTRAVENOUS AS NEEDED
Status: DISCONTINUED | OUTPATIENT
Start: 2024-10-16 | End: 2024-10-16 | Stop reason: HOSPADM

## 2024-10-16 RX ORDER — ONDANSETRON 2 MG/ML
INJECTION INTRAMUSCULAR; INTRAVENOUS AS NEEDED
Status: DISCONTINUED | OUTPATIENT
Start: 2024-10-16 | End: 2024-10-16 | Stop reason: SURG

## 2024-10-16 RX ORDER — SODIUM CHLORIDE 0.9 % (FLUSH) 0.9 %
3-10 SYRINGE (ML) INJECTION AS NEEDED
Status: DISCONTINUED | OUTPATIENT
Start: 2024-10-16 | End: 2024-10-16 | Stop reason: HOSPADM

## 2024-10-16 RX ORDER — CEPHALEXIN 500 MG/1
500 CAPSULE ORAL 4 TIMES DAILY
Qty: 20 CAPSULE | Refills: 0 | Status: SHIPPED | OUTPATIENT
Start: 2024-10-16 | End: 2024-10-21

## 2024-10-16 RX ORDER — FENTANYL CITRATE 50 UG/ML
50 INJECTION, SOLUTION INTRAMUSCULAR; INTRAVENOUS ONCE AS NEEDED
Status: DISCONTINUED | OUTPATIENT
Start: 2024-10-16 | End: 2024-10-16 | Stop reason: HOSPADM

## 2024-10-16 RX ORDER — LIDOCAINE HYDROCHLORIDE 20 MG/ML
INJECTION, SOLUTION INFILTRATION; PERINEURAL AS NEEDED
Status: DISCONTINUED | OUTPATIENT
Start: 2024-10-16 | End: 2024-10-16 | Stop reason: SURG

## 2024-10-16 RX ORDER — ROCURONIUM BROMIDE 10 MG/ML
INJECTION, SOLUTION INTRAVENOUS AS NEEDED
Status: DISCONTINUED | OUTPATIENT
Start: 2024-10-16 | End: 2024-10-16 | Stop reason: SURG

## 2024-10-16 RX ORDER — OXYCODONE AND ACETAMINOPHEN 7.5; 325 MG/1; MG/1
1 TABLET ORAL EVERY 4 HOURS PRN
Status: DISCONTINUED | OUTPATIENT
Start: 2024-10-16 | End: 2024-10-16 | Stop reason: HOSPADM

## 2024-10-16 RX ORDER — FENTANYL CITRATE 50 UG/ML
INJECTION, SOLUTION INTRAMUSCULAR; INTRAVENOUS AS NEEDED
Status: DISCONTINUED | OUTPATIENT
Start: 2024-10-16 | End: 2024-10-16 | Stop reason: SURG

## 2024-10-16 RX ORDER — DIPHENHYDRAMINE HYDROCHLORIDE 50 MG/ML
12.5 INJECTION INTRAMUSCULAR; INTRAVENOUS
Status: DISCONTINUED | OUTPATIENT
Start: 2024-10-16 | End: 2024-10-16 | Stop reason: HOSPADM

## 2024-10-16 RX ORDER — ONDANSETRON 2 MG/ML
4 INJECTION INTRAMUSCULAR; INTRAVENOUS ONCE AS NEEDED
Status: DISCONTINUED | OUTPATIENT
Start: 2024-10-16 | End: 2024-10-16 | Stop reason: HOSPADM

## 2024-10-16 RX ORDER — MAGNESIUM SULFATE HEPTAHYDRATE 500 MG/ML
INJECTION, SOLUTION INTRAMUSCULAR; INTRAVENOUS AS NEEDED
Status: DISCONTINUED | OUTPATIENT
Start: 2024-10-16 | End: 2024-10-16 | Stop reason: SURG

## 2024-10-16 RX ORDER — DEXAMETHASONE SODIUM PHOSPHATE 4 MG/ML
INJECTION, SOLUTION INTRA-ARTICULAR; INTRALESIONAL; INTRAMUSCULAR; INTRAVENOUS; SOFT TISSUE AS NEEDED
Status: DISCONTINUED | OUTPATIENT
Start: 2024-10-16 | End: 2024-10-16 | Stop reason: SURG

## 2024-10-16 RX ORDER — HYDRALAZINE HYDROCHLORIDE 20 MG/ML
5 INJECTION INTRAMUSCULAR; INTRAVENOUS
Status: DISCONTINUED | OUTPATIENT
Start: 2024-10-16 | End: 2024-10-16 | Stop reason: HOSPADM

## 2024-10-16 RX ORDER — PROMETHAZINE HYDROCHLORIDE 25 MG/1
25 SUPPOSITORY RECTAL ONCE AS NEEDED
Status: DISCONTINUED | OUTPATIENT
Start: 2024-10-16 | End: 2024-10-16 | Stop reason: HOSPADM

## 2024-10-16 RX ORDER — EPHEDRINE SULFATE 50 MG/ML
5 INJECTION, SOLUTION INTRAVENOUS ONCE AS NEEDED
Status: DISCONTINUED | OUTPATIENT
Start: 2024-10-16 | End: 2024-10-16 | Stop reason: HOSPADM

## 2024-10-16 RX ORDER — HYDROCODONE BITARTRATE AND ACETAMINOPHEN 5; 325 MG/1; MG/1
1 TABLET ORAL EVERY 4 HOURS PRN
Qty: 35 TABLET | Refills: 0 | Status: SHIPPED | OUTPATIENT
Start: 2024-10-16

## 2024-10-16 RX ORDER — HYDROCODONE BITARTRATE AND ACETAMINOPHEN 5; 325 MG/1; MG/1
1 TABLET ORAL ONCE AS NEEDED
Status: COMPLETED | OUTPATIENT
Start: 2024-10-16 | End: 2024-10-16

## 2024-10-16 RX ORDER — NALOXONE HCL 0.4 MG/ML
0.2 VIAL (ML) INJECTION AS NEEDED
Status: DISCONTINUED | OUTPATIENT
Start: 2024-10-16 | End: 2024-10-16 | Stop reason: HOSPADM

## 2024-10-16 RX ORDER — MIDAZOLAM HYDROCHLORIDE 1 MG/ML
1 INJECTION INTRAMUSCULAR; INTRAVENOUS
Status: DISCONTINUED | OUTPATIENT
Start: 2024-10-16 | End: 2024-10-16 | Stop reason: HOSPADM

## 2024-10-16 RX ORDER — LABETALOL HYDROCHLORIDE 5 MG/ML
5 INJECTION, SOLUTION INTRAVENOUS
Status: DISCONTINUED | OUTPATIENT
Start: 2024-10-16 | End: 2024-10-16 | Stop reason: HOSPADM

## 2024-10-16 RX ADMIN — HYDROCODONE BITARTRATE AND ACETAMINOPHEN 1 TABLET: 5; 325 TABLET ORAL at 11:39

## 2024-10-16 RX ADMIN — SUGAMMADEX 200 MG: 100 INJECTION, SOLUTION INTRAVENOUS at 11:10

## 2024-10-16 RX ADMIN — FAMOTIDINE 20 MG: 10 INJECTION INTRAVENOUS at 08:31

## 2024-10-16 RX ADMIN — FENTANYL CITRATE 50 MCG: 50 INJECTION, SOLUTION INTRAMUSCULAR; INTRAVENOUS at 11:51

## 2024-10-16 RX ADMIN — CEFAZOLIN 2 G: 2 INJECTION, POWDER, FOR SOLUTION INTRAMUSCULAR; INTRAVENOUS at 09:50

## 2024-10-16 RX ADMIN — FENTANYL CITRATE 50 MCG: 50 INJECTION, SOLUTION INTRAMUSCULAR; INTRAVENOUS at 10:03

## 2024-10-16 RX ADMIN — DEXAMETHASONE SODIUM PHOSPHATE 10 MG: 4 INJECTION, SOLUTION INTRAMUSCULAR; INTRAVENOUS at 10:22

## 2024-10-16 RX ADMIN — HYDROMORPHONE HYDROCHLORIDE 0.5 MG: 0.5 INJECTION, SOLUTION INTRAMUSCULAR; INTRAVENOUS; SUBCUTANEOUS at 11:39

## 2024-10-16 RX ADMIN — SODIUM CHLORIDE, POTASSIUM CHLORIDE, SODIUM LACTATE AND CALCIUM CHLORIDE 9 ML/HR: 600; 310; 30; 20 INJECTION, SOLUTION INTRAVENOUS at 08:31

## 2024-10-16 RX ADMIN — ONDANSETRON 4 MG: 2 INJECTION INTRAMUSCULAR; INTRAVENOUS at 10:56

## 2024-10-16 RX ADMIN — PROPOFOL 150 MG: 10 INJECTION, EMULSION INTRAVENOUS at 10:10

## 2024-10-16 RX ADMIN — Medication 50 MG: at 10:24

## 2024-10-16 RX ADMIN — ROCURONIUM BROMIDE 50 MG: 10 INJECTION, SOLUTION INTRAVENOUS at 10:10

## 2024-10-16 RX ADMIN — MAGNESIUM SULFATE HEPTAHYDRATE 2 G: 500 INJECTION, SOLUTION INTRAMUSCULAR; INTRAVENOUS at 10:22

## 2024-10-16 RX ADMIN — LIDOCAINE HYDROCHLORIDE 70 MG: 20 INJECTION, SOLUTION INFILTRATION; PERINEURAL at 10:03

## 2024-10-16 NOTE — BRIEF OP NOTE
LUMBAR DISCECTOMY POSTERIOR WITH METRIX  Progress Note    Radha Robles  10/16/2024    Pre-op Diagnosis:   Chronic right-sided low back pain with right-sided sciatica [M54.41, G89.29]       Post-Op Diagnosis Codes:     * Chronic right-sided low back pain with right-sided sciatica [M54.41, G89.29]    Procedure/CPT® Codes:        Procedure(s):  Right lumbar 4 to lumbar 5 laminectomy with metrx              Surgeon(s):  Rich Conroy MD    Anesthesia: General    Staff:   Circulator: Anamaria Vela RN  Assistant: Indu Welch CSA  Other: Amrita Ya RN  Assistant: Indu Welch CSA      Estimated Blood Loss: 100ml    Urine Voided: * No values recorded between 10/16/2024  9:55 AM and 10/16/2024 11:16 AM *    Specimens:                None          Drains: * No LDAs found *    Findings: Lateral recess and foraminal stenosis        Complications: None      Rich Conroy MD     Date: 10/16/2024  Time: 11:29 EDT

## 2024-10-16 NOTE — OP NOTE
Preop diagnosis: Lumbar radiculopathy secondary to lumbar stenosis with neurogenic claudication L4-5 right side    Postop diagnosis: same    Procedure performed: Right L4-5 laminectomy, medial facetectomy using minimal access spinal technologies microsurgical technique microsurgical instrumentation    Spinal Surgery Levels Completed:1 Level     Surgeon: Rich Conroy M.D.    Assistant: Indu Welch CFA who was instrumental in helping with visualization of neural structures, hemostasis, and retraction of neural structures.  Her skilled assistance was necessary for the success of this case    Indications for the procedure:  This is a patient with severe pain in the legs.  Previous imaging had shown neural compression at the L4-5 levels.  As a result of this and the failure of conservative therapy the patient elected to proceed with surgery.    Operative summary:  After induction of general anesthesia the patient was intubated and placed on the operating table in the prone position on a Jonathon table.  All pressure points were padded including peripheral points of entrapment.  The back was prepped with Chloraprep and then draped with Ioban, half sheets, and a split sheet.      The L4-5 level was localized with intraoperative fluoroscopy an incision was made on the right just above the pedicle.  Successive dilating tubes up to 18 mm by 4 cm were placed over that area.  Soft tissue was then removed from the supralaminar space.  The inferior laminar arch of L4 as well as the superior laminar arch of L5 and the medial aspect of facet were removed with the Ju drill.  The remainder of the operation was done under high-power magnification of the operating microscope using microsurgical technique and microsurgical instrumentation.  The ligamentum flavum was opened and removed out to the level of the pedicle using the Kerrison rongeurs.  This exposed the lateral thecal sac and the nerve root of L5.  Once the lateral  recess was opened the dissection was carried up to the L4 pedicle completely decompressing the superior nerve root.  There was quite a bit of ligamentous hypertrophy causing some compression of the nerve on that side    Once this was done the L5 nerve root was mobilized medially to expose the disc. There was no evidence of a disc herniation following which the bleeding was controlled with bipolar cautery.    Once the entire decompression was completed we were able to explore under the nerve root and the thecal sac using the Austin ball probe to be sure there was no evidence of residual compression.  There being none bleeding was controlled again using the bipolar cautery, FloSeal, and thrombin and Gelfoam.  The area was then copiously irrigated with vancomycin solution and the tube was removed. The paraspinous musculature was injected with 100 mL 1/8% Marcaine with 1:200,000 epinephrine solution.    Another gram of Kefzol was given prior to closure.    The incision was then closed in layers and dressed and the patient was taken to the recovery room in stable condition there were no apparent complications.  Sponge, instrument, and needle counts were correct at the end of the procedure.

## 2024-10-16 NOTE — ANESTHESIA PREPROCEDURE EVALUATION
Anesthesia Evaluation     history of anesthetic complications:  PONV               Airway   Mallampati: I  TM distance: >3 FB  Neck ROM: full  Dental - normal exam     Pulmonary    (+) ,shortness of breath  (-) not a smoker  Cardiovascular     (+) hypertension  (-) dysrhythmias, angina    ROS comment: Low voltage consider ant sept MI    Neuro/Psych  (-) dizziness/light headedness, syncope  GI/Hepatic/Renal/Endo    (+) obesity, GERD, thyroid problem thyroid nodules  (-) liver disease, no renal disease, diabetes    Musculoskeletal     Abdominal    Substance History   (+) alcohol use     OB/GYN          Other                      Anesthesia Plan    ASA 2     general     intravenous induction     Anesthetic plan, risks, benefits, and alternatives have been provided, discussed and informed consent has been obtained with: patient.    CODE STATUS:

## 2024-10-16 NOTE — ANESTHESIA PROCEDURE NOTES
Airway  Urgency: elective    Date/Time: 10/16/2024 10:13 AM    General Information and Staff    Patient location during procedure: OR    Indications and Patient Condition  Indications for airway management: airway protection    Preoxygenated: yes  Mask difficulty assessment: 1 - vent by mask    Final Airway Details  Final airway type: endotracheal airway      Successful airway: ETT  Cuffed: yes   Successful intubation technique: direct laryngoscopy  Endotracheal tube insertion site: oral  Blade: Millan  Blade size: 2  ETT size (mm): 6.5  Cormack-Lehane Classification: grade I - full view of glottis  Placement verified by: chest auscultation and capnometry   Measured from: lips  ETT/EBT  to lips (cm): 20  Assessment: lips, teeth, and gum same as pre-op and atraumatic intubation

## 2024-10-16 NOTE — ANESTHESIA POSTPROCEDURE EVALUATION
Patient: Radha Robles    Procedure Summary       Date: 10/16/24 Room / Location: [facility] OR  /  [facility] OR    Anesthesia Start: 0955 Anesthesia Stop: 1120    Procedure: Right lumbar 4 to lumbar 5 laminectomy with metrx (Right: Spine Lumbar) Diagnosis:       Chronic right-sided low back pain with right-sided sciatica      (Chronic right-sided low back pain with right-sided sciatica [M54.41, G89.29])    Surgeons: Rich Conroy MD Provider: Jossy Florian MD    Anesthesia Type: general ASA Status: 2            Anesthesia Type: general    Vitals  Vitals Value Taken Time   /80 10/16/24 1232   Temp 36.5 °C (97.7 °F) 10/16/24 1119   Pulse 86 10/16/24 1233   Resp 16 10/16/24 1215   SpO2 95 % 10/16/24 1233   Vitals shown include unfiled device data.        Post Anesthesia Care and Evaluation    Anesthetic complications: No anesthetic complications

## 2024-10-16 NOTE — DISCHARGE INSTRUCTIONS
LUMBAR SURGERY - ABBIE MOISE M.D.  [address], [address]  [place] [address]  [phone]    Instructions & Care After Your Lumbar Surgery    1. No sitting except on the commode.  You may lie on a firm couch but not on a waterbed or recliner.  You may lie in any position that is comfortable, using only one pillow under your head. Either stand at a counter or lie on your side for meals. Three weeks after surgery you may begin sitting for 30 minutes 3 times per day.    2. No driving for three weeks.  You may ride in the car in a passenger seat that reclines or lying down in the back seat.      3. No bending. If you drop something allow someone else to pick it up.    4. Don’t lift anything heavier than a coffee cup or paperback book.    5. Gradually increase your activity each day.  You should get out of bed every hour during the day.  Walk outside as soon as you feel up to it.  Walk short distances frequently rather than making a long trip.  Frequency is more important than distance.  Your goal is to be walking 2 to 3 miles per day when you return for your post-operative visit. (Never do this in one trip.)    6. You may climb stairs.    7. Remove your bandage the second day after surgery and leave it off.  If you notice any redness, swelling or drainage, call the office.  There are steri-strips across the incision.  If these are still present ten days after surgery, remove them gently.      8. You may shower five days after surgery.  Keep the incision dry until then.  Don’t let the water beat directly on the incision and gently pat it dry.    9. Physical Therapy will be arranged at your post-operative visit if needed.    10. Your prescription for pain medication may be filled for half the original amount prior to your return office visit.  Due to changes in Federal Law in order to have this medication refilled you must contact the office four days prior to the date and make arrangements to pick the  prescription up in the office.  This prescription refill cannot be called in to the pharmacy. Your prescription will be ready for pick-up the day the refill is due.    Don’t be alarmed if you experience some of your pre-operative symptoms after going home.  This is not uncommon and normally goes away in a few days but may last longer.  If you have any questions or concerns, please call our office.

## 2024-10-30 ENCOUNTER — TELEPHONE (OUTPATIENT)
Dept: NEUROSURGERY | Facility: CLINIC | Age: 49
End: 2024-10-30
Payer: COMMERCIAL

## 2024-10-30 ENCOUNTER — OFFICE VISIT (OUTPATIENT)
Dept: NEUROSURGERY | Facility: CLINIC | Age: 49
End: 2024-10-30
Payer: COMMERCIAL

## 2024-10-30 VITALS
SYSTOLIC BLOOD PRESSURE: 123 MMHG | TEMPERATURE: 98 F | HEART RATE: 68 BPM | DIASTOLIC BLOOD PRESSURE: 78 MMHG | OXYGEN SATURATION: 98 %

## 2024-10-30 DIAGNOSIS — M54.16 LUMBAR RADICULOPATHY: ICD-10-CM

## 2024-10-30 DIAGNOSIS — M48.062 SPINAL STENOSIS, LUMBAR REGION, WITH NEUROGENIC CLAUDICATION: ICD-10-CM

## 2024-10-30 DIAGNOSIS — Z98.890 STATUS POST LUMBAR SURGERY: Primary | ICD-10-CM

## 2024-10-30 NOTE — PROGRESS NOTES
Subjective   Patient ID: Radha Robles is a 49 y.o. female is here today for 2 week PO follow-up. Patient had a Right lumbar 4 to lumbar 5 laminectomy with metrx w/ Dr. Conroy    History of Present Illness    Patient presents today for 2-week postop visit for right L4-5 laminectomy with Metrx by Dr. Conroy on 10/16/2024.  Patient reports she is doing extremely well since surgery.  She has had complete resolution of the right leg pain/tingling.  She reports she is walking over 2 miles a day.  She is not taking narcotic pain medicine or muscle relaxers.  She will take ibuprofen occasionally.  Overall she is very happy with her progress.  She has no complaints today.    She presents today unaccompanied    The following portions of the patient's history were reviewed and updated as appropriate: allergies, current medications, past family history, past medical history, past social history, past surgical history, and problem list.    Review of Systems   Gastrointestinal:         Denies bowel incontinence   Genitourinary:  Negative for difficulty urinating and dysuria.   Musculoskeletal:  Positive for myalgias. Negative for back pain.   Neurological:  Negative for weakness and numbness.   Psychiatric/Behavioral:  The patient is not nervous/anxious.      Objective     Vitals:    10/30/24 1021   BP: 123/78   Cuff Size: Adult   Pulse: 68   Temp: 98 °F (36.7 °C)   SpO2: 98%     There is no height or weight on file to calculate BMI.    Tobacco Use: Low Risk  (10/30/2024)    Patient History     Smoking Tobacco Use: Never     Smokeless Tobacco Use: Never     Passive Exposure: Never          Physical Exam  Vitals reviewed.   Constitutional:       Appearance: Normal appearance.   Pulmonary:      Effort: Pulmonary effort is normal.   Musculoskeletal:      Cervical back: Normal.      Thoracic back: Normal.      Lumbar back: Normal.      Comments: Lumbar surgical incision is well-appearing, well-healing.  Steri-Strips in place.  No  surrounding erythema, swelling or drainage.   Skin:     General: Skin is warm and dry.   Neurological:      Motor: Motor strength is normal.  Psychiatric:         Speech: Speech normal.       Neurological Exam  Mental Status  Awake, alert and oriented to person, place and time. Recent and remote memory are intact. Speech is normal. Language is fluent with no aphasia.    Motor  Normal muscle bulk throughout. Normal muscle tone. Strength is 5/5 throughout all four extremities.    Sensory  Sensation is intact to light touch, pinprick, vibration and proprioception in all four extremities.    Gait  Normal casual, toe, heel and tandem gait.          Assessment & Plan   Independent Review of Radiographic Studies:      No imaging to review    Medical Decision Makin-year-old female who is 2-weeks postop from right L4-5 laminectomy with Metrx by Dr. Conroy.  She is extremely happy with her progress.  She has no complaints today.  Discussed with her that she can start sitting for 20 to 30 minutes 3 times a day.  She can slowly start increasing her activity level and her weight limit can increase to 10 to 15 pounds.  She does not feel that she needs physical therapy at this time.  She will follow-up in 1 month with Dr. Conroy.  She can call the office with any questions or concerns.    Diagnoses and all orders for this visit:    1. Status post lumbar surgery (Primary)    2. Lumbar radiculopathy    3. Spinal stenosis, lumbar region, with neurogenic claudication      Return in about 4 weeks (around 2024).

## 2024-10-30 NOTE — TELEPHONE ENCOUNTER
49-year-old female who is 2-weeks postop from right L4-5 laminectomy with Metrx by Dr. Moise.  She is extremely happy with her progress.  She has no complaints today.  Discussed with her that she can start sitting for 20 to 30 minutes 3 times a day.  She can slowly start increasing her activity level and her weight limit can increase to 10 to 15 pounds.  She does not feel that she needs physical therapy at this time.  She will follow-up in 1 month with Dr. Moise.  She can call the office with any questions or concerns.     Diagnoses and all orders for this visit:     1. Status post lumbar surgery (Primary)     2. Lumbar radiculopathy     3. Spinal stenosis, lumbar region, with neurogenic claudication        Return in about 4 weeks (around 11/27/2024).                     10/30/24 PT  ALREADY HAS AN ESTABLISHED APPT WITH DR MOISE ON 12/03/24

## 2024-11-13 ENCOUNTER — OFFICE VISIT (OUTPATIENT)
Dept: INTERNAL MEDICINE | Facility: CLINIC | Age: 49
End: 2024-11-13
Payer: COMMERCIAL

## 2024-11-13 VITALS
DIASTOLIC BLOOD PRESSURE: 82 MMHG | HEIGHT: 67 IN | BODY MASS INDEX: 29.57 KG/M2 | TEMPERATURE: 97.3 F | HEART RATE: 93 BPM | OXYGEN SATURATION: 97 % | SYSTOLIC BLOOD PRESSURE: 116 MMHG | WEIGHT: 188.4 LBS

## 2024-11-13 DIAGNOSIS — F90.9 ATTENTION DEFICIT HYPERACTIVITY DISORDER (ADHD), UNSPECIFIED ADHD TYPE: Primary | ICD-10-CM

## 2024-11-13 DIAGNOSIS — I10 HYPERTENSION, UNSPECIFIED TYPE: ICD-10-CM

## 2024-11-13 DIAGNOSIS — F90.9 ATTENTION DEFICIT HYPERACTIVITY DISORDER (ADHD), UNSPECIFIED ADHD TYPE: ICD-10-CM

## 2024-11-13 DIAGNOSIS — R74.8 LOW SERUM HDL: ICD-10-CM

## 2024-11-13 PROCEDURE — 99214 OFFICE O/P EST MOD 30 MIN: CPT | Performed by: STUDENT IN AN ORGANIZED HEALTH CARE EDUCATION/TRAINING PROGRAM

## 2024-11-13 RX ORDER — VALSARTAN 160 MG/1
160 TABLET ORAL DAILY
Qty: 90 TABLET | Refills: 0 | Status: CANCELLED | OUTPATIENT
Start: 2024-11-13

## 2024-11-13 RX ORDER — VALSARTAN 160 MG/1
160 TABLET ORAL DAILY
Qty: 90 TABLET | Refills: 0 | Status: SHIPPED | OUTPATIENT
Start: 2024-11-13

## 2024-11-13 RX ORDER — DEXTROAMPHETAMINE SACCHARATE, AMPHETAMINE ASPARTATE, DEXTROAMPHETAMINE SULFATE AND AMPHETAMINE SULFATE 3.75; 3.75; 3.75; 3.75 MG/1; MG/1; MG/1; MG/1
15 TABLET ORAL 2 TIMES DAILY
Qty: 60 TABLET | Refills: 0 | Status: SHIPPED | OUTPATIENT
Start: 2024-11-13

## 2024-11-13 RX ORDER — DEXTROAMPHETAMINE SACCHARATE, AMPHETAMINE ASPARTATE, DEXTROAMPHETAMINE SULFATE AND AMPHETAMINE SULFATE 3.75; 3.75; 3.75; 3.75 MG/1; MG/1; MG/1; MG/1
15 TABLET ORAL 2 TIMES DAILY
Qty: 60 TABLET | Refills: 0 | Status: CANCELLED | OUTPATIENT
Start: 2024-11-13

## 2024-11-13 NOTE — PROGRESS NOTES
"Chief Complaint  No chief complaint on file.    Subjective        Radha Robles presents to [facility]  History of Present Illness    Presents to clinic today for follow-up    Since last visit she reports she has had laminectomy for chronic back pain, she reports significant improvement in symptoms since surgery.    Her ADHD is chronic and stable, currently tolerating Adderall 50 mg twice daily well.    Objective   Vital Signs:  /82   Pulse 93   Temp 97.3 °F (36.3 °C) (Temporal)   Ht 168.9 cm (66.5\")   Wt 85.5 kg (188 lb 6.4 oz)   SpO2 97%   BMI 29.95 kg/m²   Estimated body mass index is 29.95 kg/m² as calculated from the following:    Height as of this encounter: 168.9 cm (66.5\").    Weight as of this encounter: 85.5 kg (188 lb 6.4 oz).            Physical Exam  Vitals reviewed.   Constitutional:       General: She is not in acute distress.     Appearance: Normal appearance. She is not toxic-appearing.   HENT:      Head: Normocephalic and atraumatic.   Eyes:      General: No scleral icterus.     Conjunctiva/sclera: Conjunctivae normal.   Skin:     General: Skin is warm and dry.   Neurological:      Mental Status: She is alert and oriented to person, place, and time.   Psychiatric:         Mood and Affect: Mood normal.         Behavior: Behavior normal.        Result Review :                Assessment and Plan   Diagnoses and all orders for this visit:    1. Attention deficit hyperactivity disorder (ADHD), unspecified ADHD type (Primary)  -     amphetamine-dextroamphetamine (ADDERALL) 15 MG tablet; Take 1 tablet by mouth 2 (two) times daily.  Max Daily Amount: 2 tablets  Dispense: 60 tablet; Refill: 0    2. Hypertension, unspecified type  -     valsartan (DIOVAN) 160 MG tablet; Take 1 tablet by mouth daily.  Dispense: 90 tablet; Refill: 0  -     Comprehensive Metabolic Panel; Future    3. Low serum HDL  -     Lipid Panel With / Chol / HDL Ratio; Future      Reviewed " UNIQUE BEST UTD. Will continue adderall 15mg BID    HTN is at goal today, will continue valsartan    RTC in 6mo or sooner prn, labs prior            Follow Up   Return in about 6 months (around 5/13/2025).  Patient was given instructions and counseling regarding her condition or for health maintenance advice. Please see specific information pulled into the AVS if appropriate.

## 2024-12-03 ENCOUNTER — OFFICE VISIT (OUTPATIENT)
Dept: NEUROSURGERY | Facility: CLINIC | Age: 49
End: 2024-12-03
Payer: COMMERCIAL

## 2024-12-03 DIAGNOSIS — Z09 FOLLOW-UP EXAMINATION FOLLOWING SURGERY: Primary | ICD-10-CM

## 2024-12-03 PROCEDURE — 99024 POSTOP FOLLOW-UP VISIT: CPT | Performed by: NEUROLOGICAL SURGERY

## 2024-12-03 NOTE — PROGRESS NOTES
Subjective   Patient ID: Radha Robles is a 49 y.o. female is here today for 1 month PO follow-up. Patient had a Right lumbar 4 to lumbar 5 laminectomy with metrx on 10/16/2024    Today patient states that her back feels well overall     History of Present Illness    This patient returns today.  She is doing quite well.  She has almost no pain at all.    The following portions of the patient's history were reviewed and updated as appropriate: allergies, current medications, past family history, past medical history, past social history, past surgical history, and problem list.      Objective     There were no vitals filed for this visit.  There is no height or weight on file to calculate BMI.    Tobacco Use: Low Risk  (12/3/2024)    Patient History     Smoking Tobacco Use: Never     Smokeless Tobacco Use: Never     Passive Exposure: Never          Physical Exam    Neurological:      Mental Status: He is alert and oriented to person, place, and time.       Neurological Exam    Mental Status  Alert. Oriented to person, place, and time.            Assessment & Plan   Independent Review of Radiographic Studies:      I personally reviewed the images from the following studies.    There is no new imaging to review    Medical Decision Making:      I told the patient she can go back to work next week.  Will send her back to unrestricted but she should avoid heavy lifting for another 2 or 3 weeks.  In the meantime we will start her on some physical therapy and see her back in a month.    Diagnoses and all orders for this visit:    1. Follow-up examination following surgery (Primary)  -     Ambulatory Referral to Physical Therapy for Evaluation & Treatment      Return in about 4 weeks (around 12/31/2024).

## 2024-12-11 ENCOUNTER — HOSPITAL ENCOUNTER (OUTPATIENT)
Dept: PHYSICAL THERAPY | Facility: HOSPITAL | Age: 49
Setting detail: THERAPIES SERIES
Discharge: HOME | End: 2024-12-11
Payer: COMMERCIAL

## 2024-12-11 DIAGNOSIS — Z09 FOLLOW-UP EXAMINATION FOLLOWING SURGERY: Primary | ICD-10-CM

## 2024-12-11 DIAGNOSIS — Z98.890 S/P LUMBAR LAMINECTOMY: ICD-10-CM

## 2024-12-11 PROCEDURE — 97161 PT EVAL LOW COMPLEX 20 MIN: CPT

## 2024-12-11 PROCEDURE — 97110 THERAPEUTIC EXERCISES: CPT
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
## 2024-12-12 DIAGNOSIS — F90.9 ATTENTION DEFICIT HYPERACTIVITY DISORDER (ADHD), UNSPECIFIED ADHD TYPE: ICD-10-CM

## 2024-12-13 RX ORDER — DEXTROAMPHETAMINE SACCHARATE, AMPHETAMINE ASPARTATE, DEXTROAMPHETAMINE SULFATE AND AMPHETAMINE SULFATE 3.75; 3.75; 3.75; 3.75 MG/1; MG/1; MG/1; MG/1
15 TABLET ORAL 2 TIMES DAILY
Qty: 60 TABLET | Refills: 0 | Status: SHIPPED | OUTPATIENT
Start: 2024-12-13

## 2025-01-03 DIAGNOSIS — I10 HYPERTENSION, UNSPECIFIED TYPE: ICD-10-CM

## 2025-01-03 RX ORDER — VALSARTAN 160 MG/1
160 TABLET ORAL DAILY
Qty: 90 TABLET | Refills: 1 | Status: SHIPPED | OUTPATIENT
Start: 2025-01-03

## 2025-01-06 DIAGNOSIS — F90.9 ATTENTION DEFICIT HYPERACTIVITY DISORDER (ADHD), UNSPECIFIED ADHD TYPE: ICD-10-CM

## 2025-01-06 RX ORDER — DEXTROAMPHETAMINE SACCHARATE, AMPHETAMINE ASPARTATE, DEXTROAMPHETAMINE SULFATE AND AMPHETAMINE SULFATE 3.75; 3.75; 3.75; 3.75 MG/1; MG/1; MG/1; MG/1
15 TABLET ORAL 2 TIMES DAILY
Qty: 60 TABLET | Refills: 0 | Status: CANCELLED | OUTPATIENT
Start: 2025-01-06

## 2025-01-07 ENCOUNTER — TELEPHONE (OUTPATIENT)
Dept: SURGERY | Facility: CLINIC | Age: 50
End: 2025-01-07
Payer: COMMERCIAL

## 2025-01-07 ENCOUNTER — HOSPITAL ENCOUNTER (OUTPATIENT)
Dept: PHYSICAL THERAPY | Facility: HOSPITAL | Age: 50
Setting detail: THERAPIES SERIES
Discharge: HOME | End: 2025-01-07
Payer: COMMERCIAL

## 2025-01-07 ENCOUNTER — HOSPITAL ENCOUNTER (OUTPATIENT)
Dept: MAMMOGRAPHY | Facility: HOSPITAL | Age: 50
Discharge: HOME | End: 2025-01-07
Admitting: NURSE PRACTITIONER
Payer: COMMERCIAL

## 2025-01-07 DIAGNOSIS — Z98.890 S/P LUMBAR LAMINECTOMY: ICD-10-CM

## 2025-01-07 DIAGNOSIS — Z12.31 ENCOUNTER FOR SCREENING MAMMOGRAM FOR MALIGNANT NEOPLASM OF BREAST: ICD-10-CM

## 2025-01-07 DIAGNOSIS — F90.9 ATTENTION DEFICIT HYPERACTIVITY DISORDER (ADHD), UNSPECIFIED ADHD TYPE: ICD-10-CM

## 2025-01-07 DIAGNOSIS — Z09 FOLLOW-UP EXAMINATION FOLLOWING SURGERY: Primary | ICD-10-CM

## 2025-01-07 PROCEDURE — 97110 THERAPEUTIC EXERCISES: CPT

## 2025-01-07 PROCEDURE — 77067 SCR MAMMO BI INCL CAD: CPT

## 2025-01-07 PROCEDURE — 77063 BREAST TOMOSYNTHESIS BI: CPT

## 2025-01-07 RX ORDER — DEXTROAMPHETAMINE SACCHARATE, AMPHETAMINE ASPARTATE, DEXTROAMPHETAMINE SULFATE AND AMPHETAMINE SULFATE 3.75; 3.75; 3.75; 3.75 MG/1; MG/1; MG/1; MG/1
15 TABLET ORAL 2 TIMES DAILY
Qty: 60 TABLET | Refills: 0 | Status: SHIPPED | OUTPATIENT
Start: 2025-01-15

## 2025-01-07 NOTE — TELEPHONE ENCOUNTER
----- Message from Moi Shankar sent at 1/7/2025 12:02 PM EST -----  Needs appointment  ----- Message -----  From: Interface, [employer] [place]  Sent: 1/7/2025  11:15 AM EST  To: HINA Randolph

## 2025-01-07 NOTE — THERAPY TREATMENT NOTE
Outpatient Physical Therapy Ortho Treatment Note  [facility]     Patient Name: Radha Robles  : 1975  MRN: [id number]  Today's Date: 2025      Visit Date: 2025    Visit Dx:    ICD-10-CM ICD-9-CM   1. Follow-up examination following surgery  Z09 V67.00   2. 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    ERRONEOUS ENCOUNTER--DISREGARD    Menses painful    At high risk for breast cancer    Family history of breast cancer    Neck pain    Chronic right-sided low back pain with right-sided sciatica        Past Medical History:   Diagnosis Date    Allergic     Attention deficit disorder     Cervical disc disorder     Shown in 2020 MRI..PINCHED NERVE    Chronic low back pain     Dyslipidemia     GERD (gastroesophageal reflux disease)     High blood pressure     Hypertension     Lumbosacral disc disease     Nonscarring hair loss     subclinical hyperthyroidism    Numbness and tingling     RIGHT LEG. 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  Negative TSI, TBG antibody, TPO antibodies.    Vitamin D deficiency         Past Surgical History:   Procedure Laterality Date    BREAST AUGMENTATION      ,  ,[facility]     BREAST BIOPSY Right 10/16/2022    BENIGN    COLONOSCOPY N/A 10/30/2018    Two 3 to 4 mm polyps in the sigmoid colon, IH. PATH: Hyperplastic polyp    LUMBAR  DISCECTOMY Right 10/16/2024    Procedure: Right lumbar 4 to lumbar 5 laminectomy with metrx;  Surgeon: Rich Conroy MD;  Location: [facility];  Service: Neurosurgery;  Laterality: Right;    SPINE SURGERY  10/16/24    L4-L5 Laminectomy    TOTAL LAPAROSCOPIC HYSTERECTOMY N/A 06/22/2021    Procedure: TOTAL LAPAROSCOPIC HYSTERECTOMY BILATERAL SALPINGECTOMY;  Surgeon: Minerva Baer MD;  Location: [facility];  Service: Gynecology;  Laterality: N/A;    WISDOM TOOTH EXTRACTION                          PT Assessment/Plan       Row Name 01/07/25 1500          PT Assessment    Assessment Comments Ms. Garcia returns to PT for her first f/u since initial evaluation s/p L4-L5 laminectomy on 10/16/24, now 11wks 6ds out. Overall she reports good improvements over the last several weeks, work getting easier with less significant pain. Initiated nustep for dynamic warmup as well as added seated swiss ball roll out, supine PPT, supine march w/ PPT/TrA, bridge w/ PPT, s/l clamshell, supine figure 4, supine piriformis stretch, STS w/ emphasis on TrA, resisted lateral walking, and standing march with emphasis on TrA. Overall great tolerance to all, she denies any increase in pain at termination, does report some increase fatigue in hip girdle. Spent time discussing appropriate muscular soreness following initiation of strength exercises. Did not update HEP to ensure tolerance to all new exercises added. Will plan to assess at next session and progress as appropriate.  -MO        PT Plan    PT Plan Comments tolerance to last session? Update HEP to include all stretches and some exercises if tolerable. Would benefit from lifting mechanics in the future as she has to lift frequently at work.  -MO               User Key  (r) = Recorded By, (t) = Taken By, (c) = Cosigned By      Initials Name Provider Type    MO Nishi Teixeira, PT Physical Therapist                       OP Exercises       Row Name 01/07/25 1400              Subjective    Subjective Comments Been doing much better at work. The first week was rough but it has gotten better. I do feel it when I step with my R foot  -MO         Total Minutes    56590 - PT Therapeutic Exercise Minutes 38  -MO         Exercise 1    Exercise Name 1 nustep  -MO      Time 1 5 mins lvl 5  -MO         Exercise 2    Exercise Name 2 seated SB roll out  -MO      Cueing 2 Verbal;Demo  -MO      Sets 2 1  -MO      Reps 2 5e  -MO      Time 2 5s  -MO         Exercise 3    Exercise Name 3 Supine PPT w/ TrA  -MO      Cueing 3 Verbal;Tactile  -MO      Sets 3 1  -MO      Reps 3 15  -MO         Exercise 4    Exercise Name 4 Supine march w/ PPT/ TrA  -MO      Cueing 4 Verbal  -MO      Sets 4 2 alt  -MO      Reps 4 10  -MO         Exercise 5    Exercise Name 5 supine bridge w/ PPT  -MO      Cueing 5 Verbal  -MO      Sets 5 2  -MO      Reps 5 10  -MO         Exercise 6    Exercise Name 6 s/l clamshell  -MO      Cueing 6 Verbal;Tactile  -MO      Sets 6 2B  -MO      Reps 6 10  -MO      Time 6 BW only  -MO         Exercise 7    Exercise Name 7 supine figure 4 / piriformis stretch  -MO      Cueing 7 Verbal  -MO      Sets 7 2e, BL  -MO      Reps 7 10s push, 20s pull  -MO         Exercise 8    Exercise Name 8 STS w/ TrA  -MO      Cueing 8 Verbal  -MO      Sets 8 1  -MO      Reps 8 20  -MO      Time 8 elevated mat table  -MO         Exercise 9    Exercise Name 9 lateral walking  -MO      Cueing 9 Verbal  -MO      Reps 9 4 laps  -MO      Time 9 YTB below knees  -MO         Exercise 10    Exercise Name 10 standing march w/ TrA  -MO      Cueing 10 Verbal;Demo  -MO      Sets 10 1 alt  -MO      Reps 10 10  -MO      Time 10 1 UE support  -MO                User Key  (r) = Recorded By, (t) = Taken By, (c) = Cosigned By      Initials Name Provider Type    Nishi Bustos, PT Physical Therapist                                                    Time Calculation:   Start Time: 1430  Stop Time: 1508  Time Calculation (min): 38  min  Timed Charges  03550 - PT Therapeutic Exercise Minutes: 38  Total Minutes  Timed Charges Total Minutes: 38   Total Minutes: 38  Therapy Charges for Today       Code Description Service Date Service Provider Modifiers Qty    69639310706  PT THER PROC EA 15 MIN 1/7/2025 Nishi Teixeira, PT GP 3                      Nishi Teixeira, PT  1/7/2025

## 2025-01-09 ENCOUNTER — HOSPITAL ENCOUNTER (OUTPATIENT)
Dept: PHYSICAL THERAPY | Facility: HOSPITAL | Age: 50
Setting detail: THERAPIES SERIES
Discharge: HOME | End: 2025-01-09
Payer: COMMERCIAL

## 2025-01-09 DIAGNOSIS — Z09 FOLLOW-UP EXAMINATION FOLLOWING SURGERY: ICD-10-CM

## 2025-01-09 DIAGNOSIS — Z98.890 S/P LUMBAR LAMINECTOMY: Primary | ICD-10-CM

## 2025-01-09 PROCEDURE — 97530 THERAPEUTIC ACTIVITIES: CPT

## 2025-01-09 PROCEDURE — 97110 THERAPEUTIC EXERCISES: CPT

## 2025-01-10 NOTE — THERAPY TREATMENT NOTE
Outpatient Physical Therapy Ortho Treatment Note  [facility]     Patient Name: Radha Robles  : 1975  MRN: [id number]  Today's Date: 1/10/2025      Visit Date: 2025    Visit Dx:    ICD-10-CM ICD-9-CM   1. S/P lumbar laminectomy  Z98.890 V45.89   2. 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    ERRONEOUS ENCOUNTER--DISREGARD    Menses painful    At high risk for breast cancer    Family history of breast cancer    Neck pain    Chronic right-sided low back pain with right-sided sciatica        Past Medical History:   Diagnosis Date    Allergic     Attention deficit disorder     Cervical disc disorder     Shown in 2020 MRI..PINCHED NERVE    Chronic low back pain     Dyslipidemia     GERD (gastroesophageal reflux disease)     High blood pressure     Hypertension     Lumbosacral disc disease     Nonscarring hair loss     subclinical hyperthyroidism    Numbness and tingling     RIGHT LEG. 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  Negative TSI, TBG antibody, TPO antibodies.    Vitamin D deficiency         Past Surgical History:   Procedure Laterality Date    BREAST AUGMENTATION      ,  ,[facility]     BREAST BIOPSY Right 10/16/2022    BENIGN    COLONOSCOPY N/A 10/30/2018    Two 3 to 4 mm polyps in the sigmoid colon, IH. PATH: Hyperplastic polyp    LUMBAR  DISCECTOMY Right 10/16/2024    Procedure: Right lumbar 4 to lumbar 5 laminectomy with metrx;  Surgeon: Rich Conroy MD;  Location: [facility];  Service: Neurosurgery;  Laterality: Right;    SPINE SURGERY  10/16/24    L4-L5 Laminectomy    TOTAL LAPAROSCOPIC HYSTERECTOMY N/A 06/22/2021    Procedure: TOTAL LAPAROSCOPIC HYSTERECTOMY BILATERAL SALPINGECTOMY;  Surgeon: Minerva Baer MD;  Location: [facility];  Service: Gynecology;  Laterality: N/A;    WISDOM TOOTH EXTRACTION                          PT Assessment/Plan       Row Name 01/10/25 0724          PT Assessment    Assessment Comments Pt reporting good tolerance to last session without residual soreness. She continues to tolerate work tasks well. She does report sitting in barstool height chair without foot support. Greatest hip pain follows proglonged sitting. We continued core and functional strengthening today with good tolerance. Discussed and demonstrated body mechanics necessary for pt transfers and assist and strategies to improve lumbar stabilization with any lifting tasks. Suggested box to be placed under pt's work chair vs. possible ergonomic evaluation to improve workspace tolerance. Pt improving ability to engage TrA, cuing today to improve breathing with activation. Pt with dec SL stability B, she required fingertip assist for compliant surface step ups. Overall, condition improving.  -LB        PT Plan    PT Plan Comments continue to progress, consider STS with weighted ball, lateral squat with TRX to simulate weight shift while transferring pts, ergo eval?  -LB               User Key  (r) = Recorded By, (t) = Taken By, (c) = Cosigned By      Initials Name Provider Type    LB Nadege Up, PT Physical Therapist                       OP Exercises       Row Name 01/09/25 1400             Subjective    Subjective Comments I am doing well, no issues after last visit. My hip gets a little sore at work bc I am sitting in a high chair with my  feet dangling.  -LB         Subjective Pain    Able to rate subjective pain? yes  -LB      Pre-Treatment Pain Level 1  -LB      Post-Treatment Pain Level 0  -LB         Total Minutes    68753 - PT Therapeutic Exercise Minutes 30  -LB      41965 - PT Therapeutic Activity Minutes 10  -LB         Exercise 1    Exercise Name 1 nustep  -LB      Time 1 5 mins lvl 5  -LB         Exercise 2    Exercise Name 2 seated SB roll out  -LB      Cueing 2 Verbal;Demo  -LB      Sets 2 1  -LB      Reps 2 5e  -LB      Time 2 5s  -LB         Exercise 3    Exercise Name 3 Supine PPT w/ TrA  -LB      Cueing 3 Verbal;Tactile  -LB      Sets 3 1  -LB      Reps 3 15  -LB         Exercise 4    Exercise Name 4 Supine march w/ PPT/ TrA  -LB      Cueing 4 Verbal  -LB      Sets 4 2 alt  -LB      Reps 4 10  -LB         Exercise 5    Exercise Name 5 supine bridge w/ PPT  -LB      Cueing 5 Verbal  -LB      Sets 5 2  -LB      Reps 5 10  -LB         Exercise 6    Exercise Name 6 s/l clamshell  -LB      Cueing 6 Verbal;Tactile  -LB      Sets 6 2B  -LB      Reps 6 10  -LB      Time 6 BW only  -LB         Exercise 7    Exercise Name 7 supine figure 4 / piriformis stretch  -LB      Cueing 7 Verbal  -LB      Sets 7 2e, BL  -LB      Reps 7 10s push, 20s pull  -LB         Exercise 8    Exercise Name 8 STS w/ TrA  -LB      Cueing 8 Verbal  -LB      Sets 8 1  -LB      Reps 8 20  -LB      Time 8 elevated mat table  -LB         Exercise 9    Exercise Name 9 lateral walking  -LB      Cueing 9 Verbal  -LB      Reps 9 4 laps  -LB      Time 9 YTB below knees  -LB         Exercise 10    Exercise Name 10 --  -LB      Cueing 10 --  -LB      Sets 10 --  -LB      Reps 10 --  -LB      Time 10 --  -LB         Exercise 11    Exercise Name 11 blue foam step up fwd/lateral  -LB      Sets 11 2  -LB      Reps 11 10  -LB      Time 11 3  -LB      Additional Comments fingertip assist  -LB         Exercise 12    Exercise Name 12 body mechanics discussion and demo  -LB      Time 12  10 minutes  -LB                User Key  (r) = Recorded By, (t) = Taken By, (c) = Cosigned By      Initials Name Provider Type    Nadege Bravo, PT Physical Therapist                                  PT OP Goals       Row Name 01/10/25 0700          PT Short Term Goals    STG Date to Achieve 01/10/25  -LB     STG 1 Pt. will be independent with initial HEP to improve self-management of condition.  -LB     STG 1 Progress Met  -LB     STG 1 Progress Comments progressed today  -LB     STG 2 Pt. will demonstrate proper body mechanics with forward bending/lifting activities to preserve lumbar spine with functional activities.  -LB     STG 2 Progress Ongoing  -LB     STG 2 Progress Comments reviewed today  -LB        Long Term Goals    LTG Date to Achieve 02/09/25  -LB     LTG 1 Pt. will be independent with advanced HEP to improve long term independence with self management of condition.  -LB     LTG 1 Progress Ongoing  -LB     LTG 2 Pt. will report 25% improvement in symptoms with <4/10 pain after working for 3 hours to improve participation.  -LB     LTG 2 Progress Ongoing  -LB     LTG 3 Pt. will demonstrate proper TrA engagement in standing and dynamic movements to improve lumbopelvic stability.  -LB     LTG 3 Progress Ongoing  -LB               User Key  (r) = Recorded By, (t) = Taken By, (c) = Cosigned By      Initials Name Provider Type    Nadege Bravo PT Physical Therapist                    Therapy Education  Education Details: discussed body mechanics for pt assist, pt transfers, lifting/squatting, suggested box to be placed under pt's feet during workday while in high chair position  Given: HEP, Symptoms/condition management  Program: Reinforced  How Provided: Verbal, Demonstration  Provided to: Patient  Level of Understanding: Verbalized, Demonstrated              Time Calculation:   Start Time: 1445  Stop Time: 1530  Time Calculation (min): 45 min  Total Timed Code Minutes- PT: 40 minute(s)  Timed  Charges  56983 - PT Therapeutic Exercise Minutes: 30  67342 - PT Therapeutic Activity Minutes: 10  Total Minutes  Timed Charges Total Minutes: 40   Total Minutes: 40  Therapy Charges for Today       Code Description Service Date Service Provider Modifiers Qty    96983406496  PT THER PROC EA 15 MIN 1/9/2025 Nadege Up, PT GP 2    [id number]  PT THERAPEUTIC ACT EA 15 MIN 1/9/2025 Nadege Up, PT GP 1                      Nadege Up, PT  1/10/2025

## 2025-01-14 ENCOUNTER — HOSPITAL ENCOUNTER (OUTPATIENT)
Dept: PHYSICAL THERAPY | Facility: HOSPITAL | Age: 50
Setting detail: THERAPIES SERIES
Discharge: HOME | End: 2025-01-14
Payer: COMMERCIAL

## 2025-01-14 DIAGNOSIS — Z09 FOLLOW-UP EXAMINATION FOLLOWING SURGERY: ICD-10-CM

## 2025-01-14 DIAGNOSIS — Z98.890 S/P LUMBAR LAMINECTOMY: Primary | ICD-10-CM

## 2025-01-14 PROCEDURE — 97110 THERAPEUTIC EXERCISES: CPT

## 2025-01-14 NOTE — THERAPY PROGRESS REPORT/RE-CERT
Outpatient Physical Therapy Ortho Progress Note  [facility]     Patient Name: Radha Robles  : 1975  MRN: [id number]  Today's Date: 2025      Visit Date: 2025    Visit Dx:    ICD-10-CM ICD-9-CM   1. S/P lumbar laminectomy  Z98.890 V45.89   2. 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    ERRONEOUS ENCOUNTER--DISREGARD    Menses painful    At high risk for breast cancer    Family history of breast cancer    Neck pain    Chronic right-sided low back pain with right-sided sciatica        Past Medical History:   Diagnosis Date    Allergic     Attention deficit disorder     Cervical disc disorder     Shown in 2020 MRI..PINCHED NERVE    Chronic low back pain     Dyslipidemia     GERD (gastroesophageal reflux disease)     High blood pressure     Hypertension     Lumbosacral disc disease     Nonscarring hair loss     subclinical hyperthyroidism    Numbness and tingling     RIGHT LEG. 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  Negative TSI, TBG antibody, TPO antibodies.    Vitamin D deficiency         Past Surgical History:   Procedure Laterality Date    BREAST AUGMENTATION      ,  ,[facility]     BREAST BIOPSY Right 10/16/2022    BENIGN    COLONOSCOPY N/A 10/30/2018    Two 3 to 4 mm polyps in the sigmoid colon, IH. PATH: Hyperplastic polyp    LUMBAR  DISCECTOMY Right 10/16/2024    Procedure: Right lumbar 4 to lumbar 5 laminectomy with metrx;  Surgeon: Rich Conroy MD;  Location: [facility];  Service: Neurosurgery;  Laterality: Right;    SPINE SURGERY  10/16/24    L4-L5 Laminectomy    TOTAL LAPAROSCOPIC HYSTERECTOMY N/A 06/22/2021    Procedure: TOTAL LAPAROSCOPIC HYSTERECTOMY BILATERAL SALPINGECTOMY;  Surgeon: Minerva Baer MD;  Location: [facility];  Service: Gynecology;  Laterality: N/A;    WISDOM TOOTH EXTRACTION                          PT Assessment/Plan       Row Name 01/14/25 1600          PT Assessment    Functional Limitations Impaired locomotion;Limitations in community activities;Performance in leisure activities;Performance in self-care ADL;Performance in work activities  -MO     Impairments Impaired flexibility;Posture;Poor body mechanics;Pain;Muscle strength;Locomotion;Range of motion  -MO     Assessment Comments Radha Robles has been seen for 4 physical therapy sessions s/p L4-L5 laminectomy, now 3 months out.  Treatment has included therapeutic exercise, therapeutic activity, and patient education with home exercise program . Progress to physical therapy goals is good. Pt has met 2/2 STG and 1/3 LTG. Pt has continued to make appropriate progress thus far reporting increased standing/walking tolerance time from 3hours to > 5 hours, overall reduced severity of pain with return to work, and has returned to near normal baseline activity. She demos full active trunk ROM in all directions without onset of discomfort and continues to improve awareness of TrA activation throughout functional mobility. At this time, she does continue to lack hip girdle strength as well as has increased stiffness with prolonged positioning with aberrant movement following. She is additionally impacted with current work ergonomic set up and will benefit from full ergonomic assessment to ensure appropriate body positioning for spinal protection throughout  her workday. She makes great overall progress this date, progressed several exercises. Updated HEP with review on stretching daily vs strength every other day. She will benefit from continued skilled physical therapy to address remaining impairments and functional limitations.  -MO     Please refer to paper survey for additional self-reported information No  -MO     Rehab Potential Good  -MO     Patient/caregiver participated in establishment of treatment plan and goals Yes  -MO     Patient would benefit from skilled therapy intervention Yes  -MO        PT Plan    PT Frequency 1x/week  -MO     Predicted Duration of Therapy Intervention (PT) 4-5 visits  -MO     PT Plan Comments lateral lunge, progress to full step up w/ , shoulder ext w/ march, ergo assessment  -MO               User Key  (r) = Recorded By, (t) = Taken By, (c) = Cosigned By      Initials Name Provider Type    Nishi Bustos, PT Physical Therapist                       OP Exercises       Row Name 01/14/25 1400             Subjective    Subjective Comments Doing good. I notice after sitting a while when I try to stand up it will almost take my breath away but then I walk it off  -MO         Total Minutes    94194 - PT Therapeutic Exercise Minutes 38  -MO         Exercise 1    Exercise Name 1 nustep  -MO      Time 1 5 mins lvl 5  -MO         Exercise 2    Exercise Name 2 seated SB roll out  -MO      Cueing 2 Verbal  -MO      Sets 2 1  -MO      Reps 2 5e  -MO      Time 2 5s  -MO         Exercise 3    Exercise Name 3 Supine PPT w/ TrA  -MO      Cueing 3 Verbal  -MO      Sets 3 1  -MO      Reps 3 10  -MO         Exercise 4    Exercise Name 4 resisted Supine march w/ PPT/ TrA  -MO      Cueing 4 Verbal  -MO      Sets 4 2 alt  -MO      Reps 4 10  -MO      Time 4 GTB at feet  -MO         Exercise 5    Exercise Name 5 supine bridge w/ PPT + abd  -MO      Cueing 5 Verbal  -MO      Sets 5 2  -MO      Reps 5 10  -MO      Time 5 GTB at knees  -MO          Exercise 6    Exercise Name 6 s/l clamshell  -MO      Cueing 6 Verbal  -MO      Sets 6 2B  -MO      Reps 6 10  -MO      Time 6 RTB  -MO         Exercise 7    Exercise Name 7 supine figure 4 / piriformis stretch  -MO      Cueing 7 Verbal  -MO      Sets 7 3e BL  -MO      Reps 7 10s push, 20s pull  -MO         Exercise 8    Exercise Name 8 STS w/ TrA  -MO         Exercise 11    Exercise Name 11 blue foam step up fwd w/   -MO      Sets 11 1B  -MO      Reps 11 20  -MO      Additional Comments fingertip assist  -MO                User Key  (r) = Recorded By, (t) = Taken By, (c) = Cosigned By      Initials Name Provider Type    Nishi Bustos, PT Physical Therapist                                  PT OP Goals       Row Name 01/14/25 1400          PT Short Term Goals    STG Date to Achieve 01/10/25  -MO     STG 1 Pt. will be independent with initial HEP to improve self-management of condition.  -MO     STG 1 Progress Met  -MO     STG 2 Pt. will demonstrate proper body mechanics with forward bending/lifting activities to preserve lumbar spine with functional activities.  -MO     STG 2 Progress Met  -MO        Long Term Goals    LTG Date to Achieve 02/09/25  -MO     LTG 1 Pt. will be independent with advanced HEP to improve long term independence with self management of condition.  -MO     LTG 1 Progress Ongoing  -MO     LTG 2 Pt. will report 25% improvement in symptoms with <4/10 pain after working for 3 hours to improve participation.  -MO     LTG 2 Progress Met  -MO     LTG 3 Pt. will demonstrate proper TrA engagement in standing and dynamic movements to improve lumbopelvic stability.  -MO     LTG 3 Progress Ongoing  -MO               User Key  (r) = Recorded By, (t) = Taken By, (c) = Cosigned By      Initials Name Provider Type    Nishi Bustos, PT Physical Therapist                                   Time Calculation:   Start Time: 1445  Stop Time: 1523  Time Calculation (min): 38 min  Timed Charges  41108 - PT  Therapeutic Exercise Minutes: 38  Total Minutes  Timed Charges Total Minutes: 38   Total Minutes: 38  Therapy Charges for Today       Code Description Service Date Service Provider Modifiers Qty    [id number]  PT THER PROC EA 15 MIN 1/14/2025 Nishi Teixeira, PT GP 3                      Nishi Teixeira, PT  1/14/2025

## 2025-01-16 ENCOUNTER — APPOINTMENT (OUTPATIENT)
Dept: PHYSICAL THERAPY | Facility: HOSPITAL | Age: 50
End: 2025-01-16
Payer: COMMERCIAL

## 2025-01-21 ENCOUNTER — APPOINTMENT (OUTPATIENT)
Dept: PHYSICAL THERAPY | Facility: HOSPITAL | Age: 50
End: 2025-01-21
Payer: COMMERCIAL

## 2025-01-23 ENCOUNTER — APPOINTMENT (OUTPATIENT)
Dept: PHYSICAL THERAPY | Facility: HOSPITAL | Age: 50
End: 2025-01-23
Payer: COMMERCIAL

## 2025-01-30 ENCOUNTER — OFFICE VISIT (OUTPATIENT)
Dept: NEUROSURGERY | Facility: CLINIC | Age: 50
End: 2025-01-30
Payer: COMMERCIAL

## 2025-01-30 DIAGNOSIS — G89.29 CHRONIC RIGHT-SIDED LOW BACK PAIN WITH RIGHT-SIDED SCIATICA: Primary | ICD-10-CM

## 2025-01-30 DIAGNOSIS — M54.41 CHRONIC RIGHT-SIDED LOW BACK PAIN WITH RIGHT-SIDED SCIATICA: Primary | ICD-10-CM

## 2025-01-30 PROCEDURE — 99213 OFFICE O/P EST LOW 20 MIN: CPT | Performed by: NEUROLOGICAL SURGERY

## 2025-01-30 NOTE — PROGRESS NOTES
Subjective   Patient ID: Radha Robles is a 49 y.o. female is here today for 1 month PO  follow-up post PT. Patient had a Right lumbar 4 to lumbar 5 laminectomy with metrx on 10/16/2024    Today patient states that she feels well overall     History of Present Illness  History of Present Illness  The patient is a 49-year-old female who presents for evaluation status post surgery on 10/16/2024 on the right side at L4-5 with the metrx.    She reports a significant improvement in her condition following the surgical intervention.    The following portions of the patient's history were reviewed and updated as appropriate: allergies, current medications, past family history, past medical history, past social history, past surgical history, and problem list.      Objective     There were no vitals filed for this visit.  There is no height or weight on file to calculate BMI.    Tobacco Use: Low Risk  (1/30/2025)    Patient History     Smoking Tobacco Use: Never     Smokeless Tobacco Use: Never     Passive Exposure: Never          Physical Exam    Neurological:      Mental Status: He is alert and oriented to person, place, and time.           Mental Status  Alert. Oriented to person, place, and time.      Assessment & Plan   Independent Review of Radiographic Studies:      I personally reviewed the images from the following studies.    Results        Diagnoses and all orders for this visit:    1. Chronic right-sided low back pain with right-sided sciatica (Primary)      Assessment & Plan  1. Postoperative status following right-sided L4-5 surgery.  She is currently 3 months post-surgery and reports significant improvement. She has been advised to resume her regular activities, with the exception of heavy lifting, which should be avoided indefinitely. She has been instructed to report any new or concerning symptoms promptly.    PROCEDURE  The patient underwent right-sided L4-5 surgery on 10/16/2024.    Return if  symptoms worsen or fail to improve.    Patient or patient representative verbalized consent for the use of Ambient Listening during the visit with  Rich Conroy MD for chart documentation. 1/30/2025  14:37 EST

## 2025-02-25 DIAGNOSIS — F90.9 ATTENTION DEFICIT HYPERACTIVITY DISORDER (ADHD), UNSPECIFIED ADHD TYPE: ICD-10-CM

## 2025-02-25 RX ORDER — DEXTROAMPHETAMINE SACCHARATE, AMPHETAMINE ASPARTATE, DEXTROAMPHETAMINE SULFATE AND AMPHETAMINE SULFATE 3.75; 3.75; 3.75; 3.75 MG/1; MG/1; MG/1; MG/1
15 TABLET ORAL 2 TIMES DAILY
Qty: 60 TABLET | Refills: 0 | Status: SHIPPED | OUTPATIENT
Start: 2025-02-25

## 2025-03-26 ENCOUNTER — PATIENT ROUNDING (BHMG ONLY) (OUTPATIENT)
Age: 50
End: 2025-03-26
Payer: COMMERCIAL

## 2025-03-26 NOTE — ED NOTES
Thank you for letting us care for you in your recent visit to our [facility] urgent care center. We would love to hear about your experience with us. Was this the first time you have visited our location?         We’re always looking for ways to make our patients’ experiences even better. Do you have any recommendations on ways we may improve?         I appreciate you taking the time to respond. Please be on the lookout for a survey about your recent visit from [employer] via text or email. We would greatly appreciate if you could fill that out and turn it back in. We want your voice to be heard and we value your feedback.    Thank you for choosing [facility] for your healthcare needs.         If you have concerns or would like to speak to me in person regarding your visit please feel free to give me a call. [phone]         Hope you get well soon and thank you.         Joan Pierre  Practice Manager

## 2025-03-29 DIAGNOSIS — F90.9 ATTENTION DEFICIT HYPERACTIVITY DISORDER (ADHD), UNSPECIFIED ADHD TYPE: ICD-10-CM

## 2025-03-31 RX ORDER — DEXTROAMPHETAMINE SACCHARATE, AMPHETAMINE ASPARTATE, DEXTROAMPHETAMINE SULFATE AND AMPHETAMINE SULFATE 3.75; 3.75; 3.75; 3.75 MG/1; MG/1; MG/1; MG/1
15 TABLET ORAL 2 TIMES DAILY
Qty: 60 TABLET | Refills: 0 | Status: SHIPPED | OUTPATIENT
Start: 2025-03-31

## 2025-04-22 DIAGNOSIS — F90.9 ATTENTION DEFICIT HYPERACTIVITY DISORDER (ADHD), UNSPECIFIED ADHD TYPE: ICD-10-CM

## 2025-04-22 RX ORDER — DEXTROAMPHETAMINE SACCHARATE, AMPHETAMINE ASPARTATE, DEXTROAMPHETAMINE SULFATE AND AMPHETAMINE SULFATE 3.75; 3.75; 3.75; 3.75 MG/1; MG/1; MG/1; MG/1
15 TABLET ORAL 2 TIMES DAILY
Qty: 60 TABLET | Refills: 0 | Status: SHIPPED | OUTPATIENT
Start: 2025-04-29

## 2025-05-23 ENCOUNTER — OFFICE VISIT (OUTPATIENT)
Dept: INTERNAL MEDICINE | Facility: CLINIC | Age: 50
End: 2025-05-23
Payer: COMMERCIAL

## 2025-05-23 VITALS
DIASTOLIC BLOOD PRESSURE: 80 MMHG | HEIGHT: 68 IN | SYSTOLIC BLOOD PRESSURE: 126 MMHG | HEART RATE: 82 BPM | OXYGEN SATURATION: 100 % | BODY MASS INDEX: 28.28 KG/M2 | WEIGHT: 186.6 LBS

## 2025-05-23 DIAGNOSIS — R00.2 PALPITATIONS: ICD-10-CM

## 2025-05-23 DIAGNOSIS — F90.9 ATTENTION DEFICIT HYPERACTIVITY DISORDER (ADHD), UNSPECIFIED ADHD TYPE: Primary | ICD-10-CM

## 2025-05-23 DIAGNOSIS — Z79.899 HIGH RISK MEDICATION USE: ICD-10-CM

## 2025-05-23 PROCEDURE — 99214 OFFICE O/P EST MOD 30 MIN: CPT | Performed by: STUDENT IN AN ORGANIZED HEALTH CARE EDUCATION/TRAINING PROGRAM

## 2025-05-23 RX ORDER — DEXTROAMPHETAMINE SACCHARATE, AMPHETAMINE ASPARTATE, DEXTROAMPHETAMINE SULFATE AND AMPHETAMINE SULFATE 2.5; 2.5; 2.5; 2.5 MG/1; MG/1; MG/1; MG/1
10 TABLET ORAL 2 TIMES DAILY
Qty: 60 TABLET | Refills: 0 | Status: SHIPPED | OUTPATIENT
Start: 2025-05-23

## 2025-05-23 NOTE — PROGRESS NOTES
"Chief Complaint  ADHD    Subjective        Radha Robles presents to [facility]  History of Present Illness    Presents to clinic today for follow up    She states she has been having palpitations that she seems to be associated with her adderall. She does not have any chest pain, dyspnea          Objective   Vital Signs:  /80 (BP Location: Left arm, Patient Position: Sitting, Cuff Size: Adult)   Pulse 82   Ht 172.7 cm (67.99\")   Wt 84.6 kg (186 lb 9.6 oz)   SpO2 100%   BMI 28.38 kg/m²   Estimated body mass index is 28.38 kg/m² as calculated from the following:    Height as of this encounter: 172.7 cm (67.99\").    Weight as of this encounter: 84.6 kg (186 lb 9.6 oz).            Physical Exam  Vitals reviewed.   Constitutional:       General: She is not in acute distress.     Appearance: Normal appearance. She is not toxic-appearing.   HENT:      Head: Normocephalic and atraumatic.   Eyes:      General: No scleral icterus.     Conjunctiva/sclera: Conjunctivae normal.   Cardiovascular:      Rate and Rhythm: Normal rate and regular rhythm. Extrasystoles are present.     Heart sounds: Normal heart sounds. No murmur heard.  Skin:     General: Skin is warm and dry.   Neurological:      Mental Status: She is alert and oriented to person, place, and time.   Psychiatric:         Mood and Affect: Mood normal.         Behavior: Behavior normal.        Result Review :           ECG 12 Lead    Date/Time: 5/26/2025 8:48 PM  Performed by: Shaun Garcia MD    Authorized by: Shaun Garcia MD  Comparison: compared with previous ECG from 6/16/2021  Similar to previous ECG  Rhythm: sinus rhythm  Rate: normal  ST Segments: ST segments normal  T Waves: T waves normal  QRS axis: normal  Other findings: non-specific ST-T wave changes    Clinical impression: normal ECG            Assessment and Plan   Diagnoses and all orders for this visit:    1. Attention deficit hyperactivity disorder " (ADHD), unspecified ADHD type (Primary)  -     amphetamine-dextroamphetamine (Adderall) 10 MG tablet; Take 1 tablet by mouth 2 (Two) Times a Day.  Dispense: 60 tablet; Refill: 0  -     Compliance Drug Analysis, Ur - Urine, Clean Catch    2. High risk medication use  -     Compliance Drug Analysis, Ur - Urine, Clean Catch    3. Palpitations  -     ECG 12 Lead      Suspect her adderall is slightly too high of dose with her having palpitations, will reduce dose to 10mg BID from 15mg to see how she does.     If minimal improvement or need to try to go up on dose if ADHD worsens, may need holter and/or BB to evaluate PVC burden    RTC in 6mo or sooner prn            Follow Up   No follow-ups on file.  Patient was given instructions and counseling regarding her condition or for health maintenance advice. Please see specific information pulled into the AVS if appropriate.

## 2025-05-26 PROCEDURE — 93000 ELECTROCARDIOGRAM COMPLETE: CPT | Performed by: STUDENT IN AN ORGANIZED HEALTH CARE EDUCATION/TRAINING PROGRAM

## 2025-05-30 LAB — DRUGS UR: NORMAL

## 2025-06-02 ENCOUNTER — LAB (OUTPATIENT)
Dept: LAB | Facility: HOSPITAL | Age: 50
End: 2025-06-02
Payer: COMMERCIAL

## 2025-06-02 PROCEDURE — 80053 COMPREHEN METABOLIC PANEL: CPT | Performed by: STUDENT IN AN ORGANIZED HEALTH CARE EDUCATION/TRAINING PROGRAM

## 2025-06-02 PROCEDURE — 80061 LIPID PANEL: CPT | Performed by: STUDENT IN AN ORGANIZED HEALTH CARE EDUCATION/TRAINING PROGRAM

## 2025-06-14 DIAGNOSIS — F90.9 ATTENTION DEFICIT HYPERACTIVITY DISORDER (ADHD), UNSPECIFIED ADHD TYPE: ICD-10-CM

## 2025-06-16 RX ORDER — DEXTROAMPHETAMINE SACCHARATE, AMPHETAMINE ASPARTATE, DEXTROAMPHETAMINE SULFATE AND AMPHETAMINE SULFATE 2.5; 2.5; 2.5; 2.5 MG/1; MG/1; MG/1; MG/1
10 TABLET ORAL 2 TIMES DAILY
Qty: 60 TABLET | Refills: 0 | Status: SHIPPED | OUTPATIENT
Start: 2025-06-16

## 2025-07-26 DIAGNOSIS — F90.9 ATTENTION DEFICIT HYPERACTIVITY DISORDER (ADHD), UNSPECIFIED ADHD TYPE: ICD-10-CM

## 2025-07-28 RX ORDER — DEXTROAMPHETAMINE SACCHARATE, AMPHETAMINE ASPARTATE, DEXTROAMPHETAMINE SULFATE AND AMPHETAMINE SULFATE 2.5; 2.5; 2.5; 2.5 MG/1; MG/1; MG/1; MG/1
10 TABLET ORAL 2 TIMES DAILY
Qty: 60 TABLET | Refills: 0 | Status: SHIPPED | OUTPATIENT
Start: 2025-07-28

## 2025-08-26 DIAGNOSIS — F90.9 ATTENTION DEFICIT HYPERACTIVITY DISORDER (ADHD), UNSPECIFIED ADHD TYPE: ICD-10-CM

## 2025-08-26 DIAGNOSIS — H60.509 ACUTE OTITIS EXTERNA, UNSPECIFIED LATERALITY, UNSPECIFIED TYPE: Primary | ICD-10-CM

## 2025-08-26 RX ORDER — DEXTROAMPHETAMINE SACCHARATE, AMPHETAMINE ASPARTATE, DEXTROAMPHETAMINE SULFATE AND AMPHETAMINE SULFATE 2.5; 2.5; 2.5; 2.5 MG/1; MG/1; MG/1; MG/1
10 TABLET ORAL 2 TIMES DAILY
Qty: 60 TABLET | Refills: 0 | Status: SHIPPED | OUTPATIENT
Start: 2025-08-26

## 2025-08-26 RX ORDER — ACETIC ACID 20.65 MG/ML
3 SOLUTION AURICULAR (OTIC) 3 TIMES DAILY
Qty: 15 ML | Refills: 0 | Status: SHIPPED | OUTPATIENT
Start: 2025-08-26

## (undated) DEVICE — CONN TBG Y 5 IN 1 LF STRL

## (undated) DEVICE — HARMONIC ACE +7 LAPAROSCOPIC SHEARS ADVANCED HEMOSTASIS 5MM DIAMETER 36CM SHAFT LENGTH  FOR USE WITH GRAY HAND PIECE ONLY: Brand: HARMONIC ACE

## (undated) DEVICE — PK NEURO SPINE 40

## (undated) DEVICE — CONTAINER,SPECIMEN,OR STERILE,4OZ: Brand: MEDLINE

## (undated) DEVICE — TOOL MR8-15BA50T MR8 15CM BAL SYMTRI 5MM: Brand: MIDAS REX MR8

## (undated) DEVICE — SYR LL TP 10ML STRL

## (undated) DEVICE — ADHS LIQ MASTISOL 2/3ML

## (undated) DEVICE — LOU GYN LAPAROSCOPY: Brand: MEDLINE INDUSTRIES, INC.

## (undated) DEVICE — APPL CHLORAPREP HI/LITE 26ML ORNG

## (undated) DEVICE — ADHS SKIN SURG TISS VISC PREMIERPRO EXOFIN HI/VISC FAST/DRY

## (undated) DEVICE — DEV COND GAS LAP INSUFLOW W/LUER CONN

## (undated) DEVICE — Device: Brand: DEFENDO AIR/WATER/SUCTION AND BIOPSY VALVE

## (undated) DEVICE — COVER,C-ARM,41X74: Brand: MEDLINE

## (undated) DEVICE — 3M™ STERI-STRIP™ ANTIMICROBIAL SKIN CLOSURES 1/2 INCH X 4 INCHES 50/CARTON 4 CARTONS/CASE A1847: Brand: 3M™ STERI-STRIP™

## (undated) DEVICE — ENSEAL X1 TISSUE SEALER, CURVED JAW, 37 CM SHAFT LENGTH: Brand: ENSEAL

## (undated) DEVICE — LAPAROSCOPIC SMOKE FILTRATION SYSTEM: Brand: PALL LAPAROSHIELD® PLUS LAPAROSCOPIC SMOKE FILTRATION SYSTEM

## (undated) DEVICE — SYR CONTRL LUERLOK 10CC

## (undated) DEVICE — SOL NACL 0.9PCT 100ML SGL

## (undated) DEVICE — Device

## (undated) DEVICE — SPONGE,NEURO,.75"X.75",XR,STRL,LF,10/PK: Brand: MEDLINE

## (undated) DEVICE — SOL NACL 0.9PCT 1000ML

## (undated) DEVICE — ANTIBACTERIAL UNDYED BRAIDED (POLYGLACTIN 910), SYNTHETIC ABSORBABLE SUTURE: Brand: COATED VICRYL

## (undated) DEVICE — SINGLE-USE BIOPSY FORCEPS: Brand: RADIAL JAW 4

## (undated) DEVICE — ANTIBACTERIAL UNDYED BRAIDED (POLYGLACTIN 910), SYNTHETIC ABSORBABLE SURGICAL SUTURE: Brand: COATED VICRYL

## (undated) DEVICE — ENDOPATH PNEUMONEEDLE INSUFFLATION NEEDLES WITH LUER LOCK CONNECTORS 120MM: Brand: ENDOPATH

## (undated) DEVICE — ENDOPATH XCEL WITH OPTIVIEW TECHNOLOGY BLADELESS TROCARS WITH STABILITY SLEEVES: Brand: ENDOPATH XCEL OPTIVIEW

## (undated) DEVICE — KTTNER ENDO BLNT DISSCT

## (undated) DEVICE — GLV SURG BIOGEL LTX PF 6 1/2

## (undated) DEVICE — NEEDLE, QUINCKE, 20GX3.5": Brand: MEDLINE

## (undated) DEVICE — IRRIGATOR BULB ASEPTO 60CC STRL

## (undated) DEVICE — CANN NASL CO2 TRULINK W/O2 A/

## (undated) DEVICE — GLV SURG SENSICARE W/ALOE PF LF 7.5 STRL

## (undated) DEVICE — GLV SURG BIOGEL LTX PF 7

## (undated) DEVICE — CATH FOL SIMPLYLATEX SILELAST 18F 17IN

## (undated) DEVICE — 2, DISPOSABLE SUCTION/IRRIGATOR WITH DISPOSABLE TIP: Brand: STRYKEFLOW

## (undated) DEVICE — THE TORRENT IRRIGATION SCOPE CONNECTOR IS USED WITH THE TORRENT IRRIGATION TUBING TO PROVIDE IRRIGATION FLUIDS SUCH AS STERILE WATER DURING GASTROINTESTINAL ENDOSCOPIC PROCEDURES WHEN USED IN CONJUNCTION WITH AN IRRIGATION PUMP (OR ELECTROSURGICAL UNIT).: Brand: TORRENT

## (undated) DEVICE — 3M™ STERI-STRIP™ REINFORCED ADHESIVE SKIN CLOSURES, R1546, 1/4 IN X 4 IN (6 MM X 100 MM), 10 STRIPS/ENVELOPE: Brand: 3M™ STERI-STRIP™

## (undated) DEVICE — LABEL SHEET CUSTOM 2X2 YELLOW: Brand: MEDLINE INDUSTRIES, INC.

## (undated) DEVICE — UNDYED BRAIDED (POLYGLACTIN 910), SYNTHETIC ABSORBABLE SUTURE: Brand: COATED VICRYL

## (undated) DEVICE — SUT VIC 5/0 PS2 18IN J495H

## (undated) DEVICE — SPNG GZ WOVN 4X4IN 12PLY 10/BX STRL

## (undated) DEVICE — ENDOCUT SCISSOR TIP, DISPOSABLE: Brand: RENEW

## (undated) DEVICE — DRP MICROSCP LEICA 137X381CM

## (undated) DEVICE — DISSCT ENDO

## (undated) DEVICE — TUBING, SUCTION, 1/4" X 10', STRAIGHT: Brand: MEDLINE